# Patient Record
Sex: FEMALE | Race: BLACK OR AFRICAN AMERICAN | NOT HISPANIC OR LATINO | Employment: OTHER | URBAN - METROPOLITAN AREA
[De-identification: names, ages, dates, MRNs, and addresses within clinical notes are randomized per-mention and may not be internally consistent; named-entity substitution may affect disease eponyms.]

---

## 2020-07-09 ENCOUNTER — OFFICE VISIT (OUTPATIENT)
Dept: INTERNAL MEDICINE | Facility: CLINIC | Age: 80
End: 2020-07-09
Payer: MEDICARE

## 2020-07-09 VITALS
HEIGHT: 66 IN | SYSTOLIC BLOOD PRESSURE: 146 MMHG | OXYGEN SATURATION: 99 % | DIASTOLIC BLOOD PRESSURE: 78 MMHG | HEART RATE: 75 BPM | WEIGHT: 130.06 LBS | BODY MASS INDEX: 20.9 KG/M2

## 2020-07-09 DIAGNOSIS — Z79.899 DRUG THERAPY: ICD-10-CM

## 2020-07-09 DIAGNOSIS — R41.3 MEMORY CHANGE: ICD-10-CM

## 2020-07-09 DIAGNOSIS — Z00.00 ANNUAL PHYSICAL EXAM: Primary | ICD-10-CM

## 2020-07-09 DIAGNOSIS — Z76.89 ENCOUNTER TO ESTABLISH CARE WITH NEW DOCTOR: ICD-10-CM

## 2020-07-09 DIAGNOSIS — M15.9 OSTEOARTHRITIS OF MULTIPLE JOINTS, UNSPECIFIED OSTEOARTHRITIS TYPE: ICD-10-CM

## 2020-07-09 PROCEDURE — 99204 PR OFFICE/OUTPT VISIT, NEW, LEVL IV, 45-59 MIN: ICD-10-PCS | Mod: S$GLB,,, | Performed by: FAMILY MEDICINE

## 2020-07-09 PROCEDURE — 99204 OFFICE O/P NEW MOD 45 MIN: CPT | Mod: S$GLB,,, | Performed by: FAMILY MEDICINE

## 2020-07-09 PROCEDURE — 99999 PR PBB SHADOW E&M-NEW PATIENT-LVL IV: ICD-10-PCS | Mod: PBBFAC,,, | Performed by: FAMILY MEDICINE

## 2020-07-09 PROCEDURE — 99999 PR PBB SHADOW E&M-NEW PATIENT-LVL IV: CPT | Mod: PBBFAC,,, | Performed by: FAMILY MEDICINE

## 2020-07-09 NOTE — PROGRESS NOTES
"  Subjective:       Patient ID: Ally Steiner is a 80 y.o. female.    Chief Complaint: Establish Care    HPI    Here with DIL.    80yoF PMHx OA to [facility]. New to [facility]. Moved here from [place] (was living with daughter there) "to be back home." Used to live here prior to 2009.    Feels like starting to have memory issues. Having trouble remembering names. No longer driving.     No other acute complaints today. Takes several otc vitamins. Will have to talk with dtg about who she was following with in [facility] for primary care and get us records.     #Ortho: s/p bl knee replacements s/t OA    #HM:  - Breast Cancer screening: req records  - Cervical Cancer screening: req records  - Colorectal Cancer screening: req records  - Bone Density: req records    Review of Systems   Constitutional: Negative for appetite change, fatigue and fever.   HENT: Negative for congestion.    Respiratory: Negative for cough and shortness of breath.    Cardiovascular: Negative for chest pain and palpitations.   Gastrointestinal: Negative for abdominal pain, constipation, diarrhea, nausea and vomiting.   Genitourinary: Negative for dysuria.   Musculoskeletal: Negative for back pain.   Skin: Negative for rash.   Neurological: Negative for weakness, numbness and headaches.   Psychiatric/Behavioral: Negative for behavioral problems and hallucinations. The patient is not nervous/anxious.          Past Medical History:   Diagnosis Date    Osteoarthritis         Prior to Admission medications    Not on File        Past medical history, surgical history, and family medical history reviewed and updated as appropriate.    Medications and allergies reviewed.     Objective:          Vitals:    07/09/20 0955   BP: (!) 146/78   BP Location: Right arm   Patient Position: Sitting   BP Method: Medium (Manual)   Pulse: 75   SpO2: 99%   Weight: 59 kg (130 lb 1.1 oz)   Height: 5' 6" (1.676 m)     Body mass index is 20.99 kg/m².  Physical Exam  Vitals " signs and nursing note reviewed.   Constitutional:       General: She is not in acute distress.     Appearance: Normal appearance.   Eyes:      Extraocular Movements: Extraocular movements intact.   Cardiovascular:      Rate and Rhythm: Normal rate and regular rhythm.      Pulses: Normal pulses.      Heart sounds: Normal heart sounds. No murmur.   Pulmonary:      Effort: Pulmonary effort is normal. No respiratory distress.      Breath sounds: Normal breath sounds. No wheezing.   Neurological:      Mental Status: She is alert and oriented to person, place, and time.   Psychiatric:         Mood and Affect: Mood normal.         Behavior: Behavior normal.         No results found for: WBC, HGB, HCT, PLT, CHOL, TRIG, HDL, LDLDIRECT, ALT, AST, NA, K, CL, CREATININE, BUN, CO2, TSH, PSA, INR, GLUF, HGBA1C, MICROALBUR    Assessment:       1. Annual physical exam    2. Encounter to establish care with new doctor    3. Memory change    4. Drug therapy     5. Osteoarthritis of multiple joints, unspecified osteoarthritis type        Plan:   Ally was seen today for establish care.    Diagnoses and all orders for this visit:    Labs and f/u results.     Given fax number for our clinic so that can request records from prior office to send to us at earliest convenience. Unsure of what she has accomplished as far as screening in last 10 yrs.     Referring to our memory clinic for initial evaluation     Annual physical exam  -     Comprehensive metabolic panel; Future  -     CBC auto differential; Future  -     Lipid Panel; Future  -     Hemoglobin A1C; Future  -     TSH; Future    Encounter to establish care with new doctor    Memory change  -     Ambulatory referral/consult to Neuropsychology; Future    Drug therapy   -     CBC auto differential; Future  -     Lipid Panel; Future  -     Hemoglobin A1C; Future  -     TSH; Future    Osteoarthritis of multiple joints, unspecified osteoarthritis type        Health maintenance  reviewed with patient.     Follow up in about 6 months (around 1/9/2021).    Juarez Segal MD  Family Medicine  [facility]  7/9/2020

## 2020-07-10 ENCOUNTER — TELEPHONE (OUTPATIENT)
Dept: INTERNAL MEDICINE | Facility: CLINIC | Age: 80
End: 2020-07-10

## 2020-07-10 NOTE — TELEPHONE ENCOUNTER
----- Message from Juarez Segal MD sent at 7/10/2020  2:41 PM CDT -----  Please reach out to patient with results.     Labs significant for prediabetes and elevated cholesterol. Also, an unspecified kidney disease that may be chronic. Hard to determine what to make of these results until I can see old records. Reiterate to patient to try to get us records from prior doctor in [place]. Nothing to worry about at this point with labs and will trend levels moving forward now that we have a baseline. Continue to strive for healthy lifestyle choices.

## 2020-08-04 ENCOUNTER — OFFICE VISIT (OUTPATIENT)
Dept: NEUROLOGY | Facility: CLINIC | Age: 80
End: 2020-08-04
Payer: MEDICARE

## 2020-08-04 DIAGNOSIS — F02.818 LATE ONSET ALZHEIMER'S DISEASE WITH BEHAVIORAL DISTURBANCE: ICD-10-CM

## 2020-08-04 DIAGNOSIS — G30.1 LATE ONSET ALZHEIMER'S DISEASE WITH BEHAVIORAL DISTURBANCE: ICD-10-CM

## 2020-08-04 DIAGNOSIS — F02.80 MAJOR NEUROCOGNITIVE DISORDER DUE TO ALZHEIMER'S DISEASE: Primary | ICD-10-CM

## 2020-08-04 DIAGNOSIS — G30.9 MAJOR NEUROCOGNITIVE DISORDER DUE TO ALZHEIMER'S DISEASE: Primary | ICD-10-CM

## 2020-08-04 PROCEDURE — 99499 UNLISTED E&M SERVICE: CPT | Mod: 95,,, | Performed by: CLINICAL NEUROPSYCHOLOGIST

## 2020-08-04 PROCEDURE — 96116 NUBHVL XM PHYS/QHP 1ST HR: CPT | Mod: 95,,, | Performed by: CLINICAL NEUROPSYCHOLOGIST

## 2020-08-04 PROCEDURE — 99499 NO LOS: ICD-10-PCS | Mod: 95,,, | Performed by: CLINICAL NEUROPSYCHOLOGIST

## 2020-08-04 PROCEDURE — 96116 PR NEUROBEHAVIORAL STATUS EXAM BY PSYCH/PHYS: ICD-10-PCS | Mod: 95,,, | Performed by: CLINICAL NEUROPSYCHOLOGIST

## 2020-08-04 NOTE — PROGRESS NOTES
NEUROPSYCHOLOGY CONSULT (TELEHEALTH)    Referral Information  Name: Ally Steiner  MRN: [id number]  : 1940  Age: 80 y.o.  Race: -American  Gender: female  Referring Provider: Juarez Segal MD  Billing: See below for details as coding/billing has changed   Telemedicine:   The patient [place] is: Home  [place] [facility] [place]  The chief complaint leading to consultation/medical necessity is: Progressive memory loss concerning for dementia  Visit type: Virtual visit with synchronous audio and video  Total time spent with patient: 45-minutes  Each patient to whom he or she provides medical services by telemedicine is:  (1) informed of the relationship between the physician and patient and the respective role of any other health care provider with respect to management of the patient; and (2) notified that he or she may decline to receive medical services by telemedicine and may withdraw from such care at any time.  Consent/Emergency Plan: The patient expressed an understanding of the purpose of the evaluation and consented to all procedures. I informed the patient of limits to confidentiality and discussed an emergency plan.      SUMMARY/TREATMENT PLAN   Results from the neurobehavioral status exam indicate the following diagnoses and treatment plan recommendations. This was discussed with patient and family.      Diagnoses/Plan:  Problem List Items Addressed This Visit        Neuro    Late onset Alzheimer's disease with behavioral disturbance - Primary    Current Assessment & Plan     Assessment:  -Very likely late onset Alzheimer's dementia (moderate stage) but needs further work up by Memory Clinic  -Excellent family support  -Some behavioral sxs (paranoia, visual hallucinations) that family needs coaching on optimal management  Plan:  >>Memory Assessment Clinic:   1. Neuropsych: Testing and treatment plan  2. Medical/MD/NP: Eval by Dr. Ram including potential imaging study if  "needed  3. Care Management: Eval jackie Madrid                 HISTORY OF PRESENT ILLNESS AND CURRENT SYMPTOMS     Ms. Steiner has no major active problems aside from slightly elevated cholesterol and pre-diabetes.     Cognitive Symptoms:   Onset/Course:   o Per Pt: "I sure do have memory trouble." "Been gradually coming in" about the last 2-years. She could not provide more specificity without becoming tangential.  o Per Ms. Aponte (DIL): Gradual onset of short term memory trouble in the past 5-years with progressive loss in IADLs. In the past year, family has noticed an acceleration in cognitive/functional decline in the past year along with onset of mild behavioral sxs   - DIL noted that sleep quality does impact level of confusion day-to-day    Current Functional Status/Needs:  ADLs  Self-Care Eating Safety Other   Bathing: Independent  Bathroom: Independent  Other: -Independent for eating  -But needs prompting due to low appetite in the past year -Some wandering concerns but they manage now  - -None     Instrumental IADLs:   Driving Medications/Health Household Finances   -Stopped driving 5 years ago due to an episode of getting lost -No prescriptions  -She takes her Vitamins but may take more than needed and daughter in law handles -She does some simple chores now  -Daughter was helping for the past few years and her son and daughter-in-law help currently     No flowsheet data found.    Psychiatric/Behavioral Symptoms:  Mood:  Depression/Dysphoria Anxiety/Fearfulness Irritability   -"I'm in a real good place"  -Some sadness b/c she can't live on her own -None -Some irritability when she feels managed/controlled     No flowsheet data found.  No flowsheet data found.      Behavior:  Agitation/Resistance Delusions/Paranoia Hallucinations   -No major agitation just irritability per DIL -in the past year, she has had onset of mild paranoia that people are stealing from her or out to get her  -daughter-in-law notes " that there typical management style is to correct her or let her know that everything is fine but this generally serves to agitate her further -Some ?visual hallucinations  -But they are non-disturbing  -Onset in the last year  -daughter-in-law notes that typical management style from family is to correct her but this serves to further agitate her     Apathy/Motivation Repetitive/Restlessness Other   None None Not applicable     No flowsheet data found.        Neurovegetative:  Sleep/Nighttime  Appetite Energy   Some sleep disruption but generally okay -Low but does eat without difficulty Adequate     Suicidal/Homicidal Ideation: None    Physical Symptoms:  Mild arthritis along with recent onset of dizziness and some imbalance     PERTINENT BACKGROUND INFORMATION   SOCIAL HISTORY    · Family Status:  2x + 8 adult children (7 living children)    · Current Living Situation: Son's home in [place] (Just moved in May 2020 to have more support)  · Primary Source of Support: Multiple supports  · Daily Activities: At home but does do the stationary bike; they have noticed a big reduction in her activity level in the past year  · Stressors: None  · Other Factors:  · Educational Level: HS  · Occupational Status and History: Nurse Aide,     Family Neurologic History: Negative for heritable risk factors  Family Psychiatric History: Negative for heritable risk factors    MEDICAL STATUS  Patient Active Problem List   Diagnosis    Osteoarthritis of multiple joints    Drug therapy     Late onset Alzheimer's disease with behavioral disturbance     Past Medical History:   Diagnosis Date    Osteoarthritis      Past Surgical History:   Procedure Laterality Date    CHOLECYSTECTOMY      KNEE SURGERY Left 2018    knee replacement    KNEE SURGERY Right 2000    knee replacement    REDUCTION OF BOTH BREASTS Bilateral 1990       Updated/Relevant Neurologic History:  · Falls: None recently  · TBI:   "None  · Seizures: None  · Stroke: None  · Movement Concerns: Some dizziness/imbalance    Recent Labs   No results found for: HQVTSJXV44  No results found for: RPR  No results found for: FOLATE  Lab Results   Component Value Date    TSH 1.088 07/09/2020     Lab Results   Component Value Date    HGBA1C 5.8 (H) 07/09/2020     No results found for: HIV1X2, YGU66OEEF    Imaging  None    Current Medications  None apart from Vitamins per family    Updated/Relevant Psychiatric History: None      MENTAL STATUS AND OBSERVATIONS:  APPEARANCE: Casually dressed and adequate grooming/hygiene.   ALERTNESS/ORIENTATION: Attentive and alert. Year: ? Month: August Day: "2nd or 3rd" Date: Tuesday POTUS: "?" But laughs; City: [place];   GAIT: Not assessed  MOTOR MOVEMENTS/MANNERISMS: Not assessed  SPEECH/LANGUAGE: Normal in rate, rhythm, tone, and volume. No significant word finding difficulty noted. Expressive and receptive language was normal.  STATED MOOD/AFFECT: The patients stated mood was "good" Affect was congruent with stated mood.   INTERPERSONAL BEHAVIOR: Rapport was quickly and easily established   SUICIDALITY/HOMICIDALITY: Denied  HALLUCINATIONS/DELUSIONS: None evidenced or endorsed  THOUGHT PROCESSES/INSIGHT: No insight and often digressive/tangential    BILLING  Service Description CPT Code Minutes Units   Psychiatric diagnostic evaluation by physician [id number]     Neurobehavioral status exam by physician [id number] 45 1   Each additional hour by physician [id number]  0   Test Evaluation Services --  --   Neuropsychological testing evaluation services by physician [id number]  0   Each additional hour by physician [id number]  0   Test Administration and Scoring --  --   Psychological or neuropsychological test administration and scoring by physician [id number]  0   Each additional 30 minutes by physician [id number]  0   Psychological or neuropsychological test administration and scoring by technician [id number]  0   Each additional 30 minutes by technician [id number]  0 "

## 2020-08-04 NOTE — Clinical Note
She's better for memory clinic than my outpatient; the family is open to moving to memory clinic. Thanks!

## 2020-08-06 PROBLEM — F02.818 LATE ONSET ALZHEIMER'S DISEASE WITH BEHAVIORAL DISTURBANCE: Status: ACTIVE | Noted: 2020-08-06

## 2020-08-06 PROBLEM — G30.9 MAJOR NEUROCOGNITIVE DISORDER DUE TO ALZHEIMER'S DISEASE: Status: ACTIVE | Noted: 2020-08-06

## 2020-08-06 PROBLEM — G30.1 LATE ONSET ALZHEIMER'S DISEASE WITH BEHAVIORAL DISTURBANCE: Status: ACTIVE | Noted: 2020-08-06

## 2020-08-06 PROBLEM — F02.80 MAJOR NEUROCOGNITIVE DISORDER DUE TO ALZHEIMER'S DISEASE: Status: ACTIVE | Noted: 2020-08-06

## 2020-08-06 NOTE — ASSESSMENT & PLAN NOTE
Assessment:  -Very likely late onset Alzheimer's dementia (moderate stage) but needs further work up by Memory Clinic  -Excellent family support  -Some behavioral sxs (paranoia, visual hallucinations) that family needs coaching on optimal management  Plan:  >>Memory Assessment Clinic:   1. Neuropsych: Testing and treatment plan  2. Medical/MD/NP: Eval by Dr. Ram including potential imaging study if needed  3. Care Management: Eval by [facility]

## 2020-09-08 ENCOUNTER — TELEPHONE (OUTPATIENT)
Dept: NEUROLOGY | Facility: CLINIC | Age: 80
End: 2020-09-08

## 2020-09-09 ENCOUNTER — OUTPATIENT CASE MANAGEMENT (OUTPATIENT)
Dept: NEUROLOGY | Facility: CLINIC | Age: 80
End: 2020-09-09

## 2020-09-09 ENCOUNTER — LAB VISIT (OUTPATIENT)
Dept: LAB | Facility: HOSPITAL | Age: 80
End: 2020-09-09
Payer: MEDICARE

## 2020-09-09 ENCOUNTER — INITIAL CONSULT (OUTPATIENT)
Dept: NEUROLOGY | Facility: CLINIC | Age: 80
End: 2020-09-09
Payer: MEDICARE

## 2020-09-09 DIAGNOSIS — F02.818 LATE ONSET ALZHEIMER'S DISEASE WITH BEHAVIORAL DISTURBANCE: ICD-10-CM

## 2020-09-09 DIAGNOSIS — R41.3 MEMORY CHANGE: ICD-10-CM

## 2020-09-09 DIAGNOSIS — E78.5 HYPERLIPIDEMIA, UNSPECIFIED HYPERLIPIDEMIA TYPE: ICD-10-CM

## 2020-09-09 DIAGNOSIS — Z86.39 HX OF DIABETES MELLITUS: ICD-10-CM

## 2020-09-09 DIAGNOSIS — G30.1 LATE ONSET ALZHEIMER'S DISEASE WITH BEHAVIORAL DISTURBANCE: ICD-10-CM

## 2020-09-09 DIAGNOSIS — Z86.79 HISTORY OF HYPERTENSION: ICD-10-CM

## 2020-09-09 LAB
FOLATE SERPL-MCNC: 16.3 NG/ML (ref 4–24)
VIT B12 SERPL-MCNC: >2000 PG/ML (ref 210–950)

## 2020-09-09 PROCEDURE — 82746 ASSAY OF FOLIC ACID SERUM: CPT

## 2020-09-09 PROCEDURE — 99999 PR PBB SHADOW E&M-EST. PATIENT-LVL II: ICD-10-PCS | Mod: PBBFAC,,,

## 2020-09-09 PROCEDURE — 96133 NRPSYC TST EVAL PHYS/QHP EA: CPT | Mod: S$GLB,,, | Performed by: CLINICAL NEUROPSYCHOLOGIST

## 2020-09-09 PROCEDURE — 86592 SYPHILIS TEST NON-TREP QUAL: CPT

## 2020-09-09 PROCEDURE — 96132 NRPSYC TST EVAL PHYS/QHP 1ST: CPT | Mod: S$GLB,,, | Performed by: CLINICAL NEUROPSYCHOLOGIST

## 2020-09-09 PROCEDURE — 84425 ASSAY OF VITAMIN B-1: CPT

## 2020-09-09 PROCEDURE — 96139 PR PSYCH/NEUROPSYCH TEST ADMIN/SCORING, BY TECH, 2+ TESTS, EA ADDTL 30 MIN: ICD-10-PCS | Mod: S$GLB,,, | Performed by: CLINICAL NEUROPSYCHOLOGIST

## 2020-09-09 PROCEDURE — 99999 PR PBB SHADOW E&M-EST. PATIENT-LVL II: CPT | Mod: PBBFAC,,,

## 2020-09-09 PROCEDURE — 96138 PR PSYCH/NEUROPSYCH TEST ADMIN/SCORING, BY TECH, 2+ TESTS, 1ST 30 MIN: ICD-10-PCS | Mod: S$GLB,,, | Performed by: CLINICAL NEUROPSYCHOLOGIST

## 2020-09-09 PROCEDURE — 99499 UNLISTED E&M SERVICE: CPT | Mod: S$GLB,,, | Performed by: NURSE PRACTITIONER

## 2020-09-09 PROCEDURE — 99499 UNLISTED E&M SERVICE: CPT | Mod: S$GLB,,, | Performed by: PSYCHIATRY & NEUROLOGY

## 2020-09-09 PROCEDURE — 36415 COLL VENOUS BLD VENIPUNCTURE: CPT

## 2020-09-09 PROCEDURE — 96138 PSYCL/NRPSYC TECH 1ST: CPT | Mod: S$GLB,,, | Performed by: CLINICAL NEUROPSYCHOLOGIST

## 2020-09-09 PROCEDURE — 96132 PR NEUROPSYCHOLOGIC TEST EVAL SVCS, 1ST HR: ICD-10-PCS | Mod: S$GLB,,, | Performed by: CLINICAL NEUROPSYCHOLOGIST

## 2020-09-09 PROCEDURE — 96133 PR NEUROPSYCHOLOGIC TEST EVAL SVCS, EA ADDTL HR: ICD-10-PCS | Mod: S$GLB,,, | Performed by: CLINICAL NEUROPSYCHOLOGIST

## 2020-09-09 PROCEDURE — 82607 VITAMIN B-12: CPT

## 2020-09-09 PROCEDURE — 99204 OFFICE O/P NEW MOD 45 MIN: CPT | Mod: S$GLB,,, | Performed by: NURSE PRACTITIONER

## 2020-09-09 PROCEDURE — 99499 NO LOS: ICD-10-PCS | Mod: S$GLB,,, | Performed by: PSYCHIATRY & NEUROLOGY

## 2020-09-09 PROCEDURE — 99499 RISK ADDL DX/OHS AUDIT: ICD-10-PCS | Mod: S$GLB,,, | Performed by: NURSE PRACTITIONER

## 2020-09-09 PROCEDURE — 96139 PSYCL/NRPSYC TST TECH EA: CPT | Mod: S$GLB,,, | Performed by: CLINICAL NEUROPSYCHOLOGIST

## 2020-09-09 PROCEDURE — 99204 PR OFFICE/OUTPT VISIT, NEW, LEVL IV, 45-59 MIN: ICD-10-PCS | Mod: S$GLB,,, | Performed by: NURSE PRACTITIONER

## 2020-09-09 NOTE — PROGRESS NOTES
"Name: Ally Steiner  MRN: [id number]   CSN: [id number]      Date: 9-9-2020      Referring physician:  Juarez Segal MD  [address] [place] [address]    Subjective:      Chief Complaint: memory loss     History of Present Illness (HPI):    Ally Steiner is a 80 y.o. right-handed female with pre-diabetes ([id number] presents today for an initial evaluation of memory loss and is accompanied by daughter-in-law, Lise..     Ms. Steiner offers the following:   Tired today but slept well last night.     When asked about her memory, she says it is bad. She can't remember if paid bills or where she put things. She says it happened "gently" as she got older.   She offers that she gave up driving 3 years ago - got sick of driving. Gave her granddaugther her car.       Lise (Daughter-in-law) offers the following:  Been in [facility] mid-May as this is where she is from and she wanted to come home. Prior to that, shew as living in [place]. Her dtr lives out there. When moved back here, she was staying with her son and Lise in their home. Last Friday, she was adamant that she wanted to go back to her home so they took her there. Her 34 you granddaughter is there with her but they wonder if this is enough oversight.      Lise does not think she sleeps very deep as the slightest little thing will awake her.   Having some paranoia. Accuses son of taking her vitamins . Also thinks someone has stolen her money and jewelry.     Lise knows she has had memory problems at least a year. She recalls that she fell last August and her memory was not good before this. SHe feels the memory loss has been gradual.     Will ask where the children are and when they are coming back.   WIll ask about a man that she sees that is not there.     Daughter in [place] joined briefly by phone. Offered the following history:  Does not take any med, other than vitamins.   Had B knee replacement   brest " "reduction  Gallbladder removed    Hysterectomy   Was on DM  med in past. Lost weight, able to stop meds and now diet controlled.   Had HTN and HLD in past but with diet always been able to keep her off meds  Kidneys have been on the watch due to reduction of functioning. Was taking Aleve due to B knee pain before had surgery. Kidneys improved when stopped Aleve.     Recalls that she had brain sacn in [place] last year after falling and fracturing bones around chin. She will  get results and fax to us.        Review of Systems   Constitutional: Negative for appetite change.   HENT: Negative for trouble swallowing.    Cardiovascular: Positive for leg swelling ("sometimes"). Negative for chest pain ("sometimes, if I eat too much").   Gastrointestinal: Negative for constipation and diarrhea.   Genitourinary: Negative.    Musculoskeletal: Negative for gait problem.   Neurological: Positive for tremors ("Certain times if get my nerves or aggravated").   Psychiatric/Behavioral: Negative for sleep disturbance.       Past Medical History: The patient  has a past medical history of History of hypertension, diabetes mellitus, Hyperlipidemia, and Osteoarthritis.    Social History: The patient  reports that she has never smoked. She has never used smokeless tobacco. She reports that she does not drink alcohol or use drugs.    Family History: Their family history is not on file.    Allergies: Patient has no known allergies.     Meds:   No current outpatient medications on file prior to visit.     No current facility-administered medications on file prior to visit.        Objective:     Physical Exam:      Constitutional  Well-developed, well-nourished, appears stated age   Cardiovascular  no LE edema bilaterally     ..  Neurological    * Mental status     - Orientation Oriented to: person and year,      - Memory     Not Tested     - Attention/concentration  Easily distracted     - Language  spontaneous, fluent, talkative "     Face mask on (2/2 pandemic)  * Cranial nerves       - CN III, IV, VI  Extraocular movements full, normal pursuits and saccades     - CN VIII  Hearing intact bilaterally     - CN IX, X  Palate raises midline and symmetric     - CN XI  SCM and trapezius 5/5 bilaterally   * Motor  Muscle bulk reduced throughout, strength 4+/5 throughout BUE and 4/5 BLE   * Coordination  No dysmetria with finger-to-nose    * Gait  See below.     ..  * Specialized movement exam  No hypophonic speech.    No facial masking.   No cogwheel rigidity.     No bradykinesia.   No tremor with rest, posture, kinesis, or intention.    No other dystonia, chorea, athetosis, myoclonus, or tics.   No motor impersistence.   Normal-based gait.   Shortened stride length.  Bit antalgic.  Pace bit slow.   Somewhat stooped posture.    No abnormal arm swing.           Laboratory Results:  Lab Visit on 09/09/2020   Component Date Value Ref Range Status    Vitamin B-12 09/09/2020 >2000* 210 - 950 pg/mL Final    Folate 09/09/2020 16.3  4.0 - 24.0 ng/mL Final    RPR 09/09/2020 Non-reactive  Non-reactive Final   Lab Visit on 07/09/2020   Component Date Value Ref Range Status    Sodium 07/09/2020 140  136 - 145 mmol/L Final    Potassium 07/09/2020 4.3  3.5 - 5.1 mmol/L Final    Chloride 07/09/2020 105  95 - 110 mmol/L Final    CO2 07/09/2020 25  23 - 29 mmol/L Final    Glucose 07/09/2020 111* 70 - 110 mg/dL Final    BUN, Bld 07/09/2020 21  8 - 23 mg/dL Final    Creatinine 07/09/2020 1.2  0.5 - 1.4 mg/dL Final    Calcium 07/09/2020 9.8  8.7 - 10.5 mg/dL Final    Total Protein 07/09/2020 8.3  6.0 - 8.4 g/dL Final    Albumin 07/09/2020 3.7  3.5 - 5.2 g/dL Final    Total Bilirubin 07/09/2020 0.4  0.1 - 1.0 mg/dL Final    Alkaline Phosphatase 07/09/2020 85  55 - 135 U/L Final    AST 07/09/2020 18  10 - 40 U/L Final    ALT 07/09/2020 10  10 - 44 U/L Final    Anion Gap 07/09/2020 10  8 - 16 mmol/L Final    eGFR if  07/09/2020 49.7*  >60 mL/min/1.73 m^2 Final    eGFR if non  07/09/2020 43.1* >60 mL/min/1.73 m^2 Final    WBC 07/09/2020 6.16  3.90 - 12.70 K/uL Final    RBC 07/09/2020 3.90* 4.00 - 5.40 M/uL Final    Hemoglobin 07/09/2020 11.7* 12.0 - 16.0 g/dL Final    Hematocrit 07/09/2020 37.1  37.0 - 48.5 % Final    Mean Corpuscular Volume 07/09/2020 95  82 - 98 fL Final    Mean Corpuscular Hemoglobin 07/09/2020 30.0  27.0 - 31.0 pg Final    Mean Corpuscular Hemoglobin Conc 07/09/2020 31.5* 32.0 - 36.0 g/dL Final    RDW 07/09/2020 13.0  11.5 - 14.5 % Final    Platelets 07/09/2020 219  150 - 350 K/uL Final    MPV 07/09/2020 12.7  9.2 - 12.9 fL Final    Immature Granulocytes 07/09/2020 0.3  0.0 - 0.5 % Final    Gran # (ANC) 07/09/2020 3.3  1.8 - 7.7 K/uL Final    Immature Grans (Abs) 07/09/2020 0.02  0.00 - 0.04 K/uL Final    Lymph # 07/09/2020 2.2  1.0 - 4.8 K/uL Final    Mono # 07/09/2020 0.5  0.3 - 1.0 K/uL Final    Eos # 07/09/2020 0.2  0.0 - 0.5 K/uL Final    Baso # 07/09/2020 0.03  0.00 - 0.20 K/uL Final    nRBC 07/09/2020 0  0 /100 WBC Final    Gran% 07/09/2020 53.3  38.0 - 73.0 % Final    Lymph% 07/09/2020 36.0  18.0 - 48.0 % Final    Mono% 07/09/2020 7.5  4.0 - 15.0 % Final    Eosinophil% 07/09/2020 2.4  0.0 - 8.0 % Final    Basophil% 07/09/2020 0.5  0.0 - 1.9 % Final    Differential Method 07/09/2020 Automated   Final    Cholesterol 07/09/2020 247* 120 - 199 mg/dL Final    Triglycerides 07/09/2020 142  30 - 150 mg/dL Final    HDL 07/09/2020 60  40 - 75 mg/dL Final    LDL Cholesterol 07/09/2020 158.6  63.0 - 159.0 mg/dL Final    Hdl/Cholesterol Ratio 07/09/2020 24.3  20.0 - 50.0 % Final    Total Cholesterol/HDL Ratio 07/09/2020 4.1  2.0 - 5.0 Final    Non-HDL Cholesterol 07/09/2020 187  mg/dL Final    Hemoglobin A1C 07/09/2020 5.8* 4.0 - 5.6 % Final    Estimated Avg Glucose 07/09/2020 120  68 - 131 mg/dL Final    TSH 07/09/2020 1.088  0.400 - 4.000 uIU/mL Final       Imaging:    Independent review of images: NA     Assessment and Plan     Late onset Alzheimer's disease with behavioral disturbance    Memory change  -     Ambulatory referral/consult to Neuropsychology  -     Vitamin B1; Future; Expected date: 09/09/2020  -     Vitamin B12; Future; Expected date: 09/09/2020  -     FOLATE; Future; Expected date: 09/09/2020  -     RPR; Future; Expected date: 09/09/2020    History of hypertension    Hx of diabetes mellitus    Hyperlipidemia, unspecified hyperlipidemia type        Medical Decision Making:    Complete reversible memory labs.    Dtr to fax MRI brain report from last year when in [place].     Discussed possible med options with Lise (memory med vs something for mood). They will consider.     Follow up one year, unless needed sooner.     I spent 40 minutes face-to-face with the patient with >50% of the time spent with counseling and education regarding:  - results of data, diagnosis, and recommendations stated above  - rationale of plan  Anabell Ram, KYAW, NP-C  Division of Movement and Memory Disorders  [facility]  [phone]

## 2020-09-09 NOTE — PROGRESS NOTES
INTERDISCIPLINARY MEMORY ASSESSMENT CLINIC  Neuropsychology Visit    Referral Information  Name: Ally Steiner  MRN: [id number]  : 1940  Age: 80 y.o.  Race: Patient Refused  Gender: female  Referring Provider: Juarez Segal Md  [id number] [place] [address]  Referral Reason/Medical Necessity: Progressive memory loss concerning for dementia  Billing: See below for details as coding/billing has changed     SUMMARY/TREATMENT PLAN   Results from interdisciplinary Memory Clinic Evaluation (neuropsych testing, clinical interviews, neurology exam, review of records) indicate the following diagnoses and treatment plan recommendations. This will be discussed with the patient/family at feedback/treatment planning/caregiver education session.    Diagnoses/Plan:  Problem List Items Addressed This Visit        Neuro    Late onset Alzheimer's disease with behavioral disturbance    Current Assessment & Plan     Assessment:  -Cognitive Testing: Global and severe impairment on testing (MoCA=5/30) with intact simple attention and simple comprehension for one step/two step commands.   -Etiology/Diagnosis:   >>Alzheimer's dementia given onset/course/type of sxs, absence of reversible causes, presentation across clinical assessments, and other factors  >>Stage: moderate (based on functioning, but scores on testing are quite low)  -Current Concerns:  >> good family support but needed Education around diagnosis and expectations for care and activities  Plan:    >>Memory Continuity Clinic: Follow-up in 12-months for:  1. Neuropsych: Testing/diagnostic update/stage of dementia  2. Medical/MD/NP: Follow up neuro review  3. Care Management: Review any needs      >>Primary Care:  Continue regular follow-up over the next year in refer back to Memory Clinic as needed      >>Practice good cognitive/brain health hygiene:   1. Engage in regular exercise, which increases alertness and arousal and can improve  "attention and focus.   2. Develop a consistent daily/weekly routine,  3. Eat healthy foods and balanced meals. Talk with your physician or nutritionist about whats right for you, but a Mediterranean diet has been found to be most effective at promoting brain health.  4. Keep your brain active. Find activities appropriate to stay mentally active.  5. Stay socially engaged. Continue staying active with your family and friends.              Cardiac/Vascular    History of hypertension    Overview     always been able to manage with diet         Hyperlipidemia    Overview     always been able to manage with diet            Endocrine    Hx of diabetes mellitus    Overview     On meds in past, lost weight and has been able to maintain with diet           Other Visit Diagnoses     Memory change        Relevant Orders    Vitamin B1    Vitamin B12 (Completed)    FOLATE (Completed)    RPR (Completed)            NIKO Guzman II, Ph.D., ABPP-CN  Board Certified Clinical Neuropsychologist  Co-Director, Cognitive Disorders and Brain Health Program  Department of Neurology and Neurosciences  [facility]    HISTORY OF PRESENT ILLNESS    Ms. Steiner has active problems noted below.     Cognitive Symptoms:   Onset/Course:   o Per Pt: "I sure do have memory trouble." "Been gradually coming in" about the last 2-years. She could not provide more specificity without becoming tangential.  o Per Ms. Aponte (DIL): Gradual onset of short term memory trouble in the past 5-years with progressive loss in IADLs. In the past year, family has noticed an acceleration in cognitive/functional decline in the past year along with onset of mild behavioral sxs   - DIL noted that sleep quality does impact level of confusion day-to-day    Initial Consultation via VV on 08/04/20:   Results noted the following:   Late onset Alzheimer's disease with behavioral disturbance - Primary   Current Assessment & Plan    Assessment:  -Very likely late " "onset Alzheimer's dementia (moderate stage) but needs further work up by Memory Clinic  -Excellent family support  -Some behavioral sxs (paranoia, visual hallucinations) that family needs coaching on optimal management  Plan:  >>Memory Assessment Clinic:   4. Neuropsych: Testing and treatment plan  5. Medical/MD/NP: Eval by Dr. Ram including potential imaging study if needed  6. Care Management: Eval by [facility]               CURRENT SYMPTOMS     Cognitive Concerns: See above and no change from initial visit via VV    Current Functional Status/Needs:   DLs  Self-Care Eating Safety Other   Bathing: Independent  Bathroom: Independent  Other: -Independent for eating  -But needs prompting due to low appetite in the past year -Some wandering concerns but they manage now  - -None     Instrumental IADLs:   Driving Medications/Health Household Finances   -Stopped driving 5 years ago due to an episode of getting lost -No prescriptions  -She takes her Vitamins but may take more than needed and daughter in law handles -She does some simple chores now  -Daughter was helping for the past few years and her son and daughter-in-law help currently     IADL 9/9/2020   Ability to Use Telephone Dials a few well-known numbers   Shopping Needs to be accompanied on any shopping trip   Food Preparation Heats and serves prepared meals or prepares meals but does not maintain adequate diet   Housekeeping Maintains house alone with occasional assistance (heavy work)   Laundry Does personal laundry completely   Mode of Transportation Does not travel at all   Responsibility for Own Medications Takes responsibility if medication is prepared in advance in separate dosages   Ability to Handle Finances Manages day-to-day purchases, but needs help with banking, major purchases, etc       Psychiatric/Behavioral Symptoms:  Mood:  Depression/Dysphoria Anxiety/Fearfulness Irritability   -"I'm in a real good place"  -Some sadness b/c she can't live on her " own -None -Some irritability when she feels managed/controlled       Behavior:  Agitation/Resistance Delusions/Paranoia Hallucinations   -No major agitation just irritability per DIL -in the past year, she has had onset of mild paranoia that people are stealing from her or out to get her  -daughter-in-law notes that there typical management style is to correct her or let her know that everything is fine but this generally serves to agitate her further -Some ?visual hallucinations  -But they are non-disturbing  -Onset in the last year  -daughter-in-law notes that typical management style from family is to correct her but this serves to further agitate her     Apathy/Motivation Repetitive/Restlessness Other   None None Not applicable     NPIQ RFS 9/9/2020   WHO IS FILLING OUT FORM? Caregiver   Does this patient have false beliefs, such as thinking that others are stealing from him/her or planning to harm him/her in some way? Yes   Delusions Severity 1   Delusion Distress 1   Does this patient have hallucinations such as false visions or voices? Soliz she/he seem to hear or see things that are not present? Yes   Hallucination Severity 1   Hallucination Distress 1   Is the patient resistive to help from others at times, or hard to handle? Yes   Agitation Agression Severity 1   Agitation/Agression Distress 1   Does the patient seem sad or say that he/she is depressed? No   Does the patient become upset when  from you? Does he/she have any other signs of nervousness such as shortness of breath, sighing, being unable tor elax, or feeling excessively tense? No   Does the patient appear to feel good or act excessively happy? No   Does this patient seem less interested in his/her usual activities or in the activities and plans of others? No   Does this patient seem to act cumpolsively, for example, talking to strangers as if she/he knows them, or saying things that may hurt people's feelings? No   Is the patient  impatient and cranky? Does he/she have difficulty coping with delays or waiting for planned activities? Yes   Irritability/Liability Severity 2   Irritability/Liability Distress 1   Does the patient engage in repetitive activities such as pacing around the house, handling buttons, wrapping string, or doing other things repeatedly? No   Does this patient awaken you during the night, rise too early in the morning, or take excessive naps during the day? No   Has the patient lost or gained weight, or had a change in the type of food he/she likes? Yes   Apetitie/Eating Severity 2   Apetite/Eating Distress 1   NPI Total Severity Score 7   NPI Total Distress Score 5       Neurovegetative:  Sleep/Nighttime  Appetite Energy   Some sleep disruption but generally okay -Low but does eat without difficulty Adequate     Suicidal/Homicidal Ideation: None    Physical Symptoms:  Mild arthritis along with recent onset of dizziness and some imbalance    PERTINENT BACKGROUND INFORMATION   Social History:  · Family Status:  2x + 8 adult children (7 living children)    · Current Living Situation: Son's home in [place] (Just moved in May 2020 to have more support)  · Primary Source of Support: Multiple supports  · Daily Activities: At home but does do the stationary bike; they have noticed a big reduction in her activity level in the past year  · Stressors: None  · Other Factors:  · Educational Level: HS  · Occupational Status and History: Nurse Aide,     Family Neurologic History: Negative for heritable risk factors  Family Psychiatric History: Negative for heritable risk factors    MEDICAL HISTORY     Patient Active Problem List   Diagnosis    Osteoarthritis of multiple joints    Drug therapy     Late onset Alzheimer's disease with behavioral disturbance    History of hypertension    Hx of diabetes mellitus    Hyperlipidemia       Past Medical History:   Diagnosis Date    History of hypertension      always been able to manage with diet    Hx of diabetes mellitus     Always controlled with diet and weight loss    Hyperlipidemia     always been able to manage with diet    Osteoarthritis        Past Surgical History:   Procedure Laterality Date    CHOLECYSTECTOMY      Hysterectomy      KNEE SURGERY Left 2018    knee replacement    KNEE SURGERY Right 2000    knee replacement    REDUCTION OF BOTH BREASTS Bilateral 1990       Pertinent Neurologic History  TBIs  None   Seizures  None    CVAs  None   Movement Dis.  None   Other  None     Pertinent Labs Impacting Cognition  Lab Results   Component Value Date    KSMJTJUR31 >2000 (H) 09/09/2020     Lab Results   Component Value Date    RPR Non-reactive 09/09/2020     Lab Results   Component Value Date    FOLATE 16.3 09/09/2020     Lab Results   Component Value Date    TSH 1.088 07/09/2020     Lab Results   Component Value Date    HGBA1C 5.8 (H) 07/09/2020     No results found for: HIV1X2, GLG35IYLD    Neurodiagnostics  None in system but daughter reports a brain MRI in the past 12 months when she was in [place] that was apparently unremarkable.  She is going to send in the study to Dr. Ram.    No current outpatient medications on file.    Relevant Psychiatric History:  · Sxs: None  · Treatment History: None  · Substance Use: None    Social History     Tobacco Use    Smoking status: Never Smoker    Smokeless tobacco: Never Used   Substance and Sexual Activity    Alcohol use: Never     Frequency: Never    Drug use: Never    Sexual activity: Not on file         MENTAL STATUS AND OBSERVATIONS:   APPEARANCE: Casually dressed and adequate grooming/hygiene.   ALERTNESS/ORIENTATION: Attentive and alert. Largely disoriented  GAIT: Review Neurology Note from Clinic  MOTOR MOVEMENTS/MANNERISMS: Review Neurology Note from Clinic  SPEECH/LANGUAGE: Normal in rate, rhythm, tone, and volume. No significant word finding difficulty noted. Expressive and receptive  "language was normal.  STATED MOOD/AFFECT: The patients stated mood was "good." Affect was congruent with stated mood.   INTERPERSONAL BEHAVIOR: Rapport was quickly and easily established   SUICIDALITY/HOMICIDALITY: Denied  HALLUCINATIONS/DELUSIONS: None evidenced or endorsed  THOUGHT PROCESSES: Unremarkable  TEST TAKING BEHAVIOR and VALIDITY: Observation of effort was suggestive of adequate engagement. The current results, therefore, are likely a valid reflection of the patient's current functioning.       PROCEDURES/TESTS ADMINISTERED:   In addition to performing a review of pertinent medical records, reviewing limits to confidentiality, conducting a clinical interview, and explaining procedures, the following measures were administered: Valeriano Cognitive Assessment (MoCA), RBANS-A, NPI-2, IADL, FAS/Animals; Trails A/B ([facility] Norms) Manual norms were used unless otherwise indicated.        DATA TABLE       BILLING     Service Description CPT Code Minutes Units   Psychiatric diagnostic evaluation by physician [id number]     Neurobehavioral status exam by physician [id number]     Each additional hour by physician [id number]     Test Evaluation Services --     Neuropsychological testing evaluation services by physician [id number] [phone]   Each additional hour by physician [id number] 1   Test Administration and Scoring --     Psychological or neuropsychological test administration and scoring by physician [id number]     Each additional 30 minutes by physician [id number]     Psychological or neuropsychological test administration and scoring by technician [id number]     Each additional 30 minutes by technician [id number]           "

## 2020-09-09 NOTE — PROGRESS NOTES
Met at assessment clinic visit  WISAM Lezama accompanied her to appt: [phone]  Daughter Alyssa also helps but works shift work: [phone]  No needs at this time and they feel they are managing things well but are open to follow up call from LCSW to go over continued care.  Next outreach assigned to LCSW.

## 2020-09-10 LAB — RPR SER QL: NORMAL

## 2020-09-11 NOTE — ASSESSMENT & PLAN NOTE
Assessment:  -Cognitive Testing: Global and severe impairment on testing (MoCA=5/30) with intact simple attention and simple comprehension for one step/two step commands.   -Etiology/Diagnosis:   >>Alzheimer's dementia given onset/course/type of sxs, absence of reversible causes, presentation across clinical assessments, and other factors  >>Stage: moderate (based on functioning, but scores on testing are quite low)  -Current Concerns:  >> good family support but needed Education around diagnosis and expectations for care and activities  Plan:    >>Memory Continuity Clinic: Follow-up in 12-months for:  1. Neuropsych: Testing/diagnostic update/stage of dementia  2. Medical/MD/NP: Follow up neuro review  3. Care Management: Review any needs      >>Primary Care:  Continue regular follow-up over the next year in refer back to Memory Clinic as needed      >>Practice good cognitive/brain health hygiene:   1. Engage in regular exercise, which increases alertness and arousal and can improve attention and focus.   2. Develop a consistent daily/weekly routine,  3. Eat healthy foods and balanced meals. Talk with your physician or nutritionist about whats right for you, but a Mediterranean diet has been found to be most effective at promoting brain health.  4. Keep your brain active. Find activities appropriate to stay mentally active.  5. Stay socially engaged. Continue staying active with your family and friends.

## 2020-09-14 ENCOUNTER — OUTPATIENT CASE MANAGEMENT (OUTPATIENT)
Dept: NEUROLOGY | Facility: CLINIC | Age: 80
End: 2020-09-14

## 2020-09-15 ENCOUNTER — OUTPATIENT CASE MANAGEMENT (OUTPATIENT)
Dept: NEUROLOGY | Facility: CLINIC | Age: 80
End: 2020-09-15

## 2020-09-18 LAB — VIT B1 BLD-MCNC: 52 UG/L (ref 38–122)

## 2020-09-19 ENCOUNTER — OUTPATIENT CASE MANAGEMENT (OUTPATIENT)
Dept: NEUROLOGY | Facility: CLINIC | Age: 80
End: 2020-09-19

## 2020-09-19 NOTE — PROGRESS NOTES
Social Work - Neuropsychology Clinic:    Phoned pt's daughter-in-law Lise, to assess care management needs. She reported family is currently able to manage care, although she did have questions anticipating the disease progression and how pt's needs may change and increase. She reported pt gets confused, for example trying to use a cell phone as a remote. Pt is currently staying during the day at son and WISAM's home; however, she strongly wants to be at her own home, so at night she goes back there and her granddtr stays with her. We discussed issues related to Advanced Care Planning and possible care needs. Advised WISAM I will e-mail multiple resources and sites related to topics discussed today, along with some general dementia info.

## 2020-10-06 ENCOUNTER — TELEPHONE (OUTPATIENT)
Dept: NEUROLOGY | Facility: CLINIC | Age: 80
End: 2020-10-06

## 2020-10-07 ENCOUNTER — OFFICE VISIT (OUTPATIENT)
Dept: NEUROLOGY | Facility: CLINIC | Age: 80
End: 2020-10-07
Payer: MEDICARE

## 2020-10-07 ENCOUNTER — OUTPATIENT CASE MANAGEMENT (OUTPATIENT)
Dept: NEUROLOGY | Facility: CLINIC | Age: 80
End: 2020-10-07

## 2020-10-07 DIAGNOSIS — Z86.79 HISTORY OF HYPERTENSION: ICD-10-CM

## 2020-10-07 DIAGNOSIS — F02.818 LATE ONSET ALZHEIMER'S DISEASE WITH BEHAVIORAL DISTURBANCE: Primary | ICD-10-CM

## 2020-10-07 DIAGNOSIS — G30.1 LATE ONSET ALZHEIMER'S DISEASE WITH BEHAVIORAL DISTURBANCE: Primary | ICD-10-CM

## 2020-10-07 DIAGNOSIS — Z86.39 HX OF DIABETES MELLITUS: ICD-10-CM

## 2020-10-07 DIAGNOSIS — E78.5 HYPERLIPIDEMIA, UNSPECIFIED HYPERLIPIDEMIA TYPE: ICD-10-CM

## 2020-10-07 PROCEDURE — 99214 PR OFFICE/OUTPT VISIT, EST, LEVL IV, 30-39 MIN: ICD-10-PCS | Mod: S$GLB,,, | Performed by: NURSE PRACTITIONER

## 2020-10-07 PROCEDURE — 99999 PR PBB SHADOW E&M-EST. PATIENT-LVL I: ICD-10-PCS | Mod: PBBFAC,,, | Performed by: NURSE PRACTITIONER

## 2020-10-07 PROCEDURE — 99499 UNLISTED E&M SERVICE: CPT | Mod: S$GLB,,, | Performed by: NURSE PRACTITIONER

## 2020-10-07 PROCEDURE — 99999 PR PBB SHADOW E&M-EST. PATIENT-LVL I: CPT | Mod: PBBFAC,,, | Performed by: NURSE PRACTITIONER

## 2020-10-07 PROCEDURE — 99214 OFFICE O/P EST MOD 30 MIN: CPT | Mod: S$GLB,,, | Performed by: NURSE PRACTITIONER

## 2020-10-07 PROCEDURE — 99499 NO LOS: ICD-10-PCS | Mod: S$GLB,,, | Performed by: NURSE PRACTITIONER

## 2020-10-07 RX ORDER — ASPIRIN 81 MG/1
81 TABLET ORAL DAILY
COMMUNITY
End: 2021-04-13

## 2020-10-07 NOTE — PROGRESS NOTES
Memory Clinic   NEUROPSYCHOLOGY FEEDBACK     Referral Information  Name: Ally Steiner  MRN: [id number]  : 1940  Age: 80 y.o.  Billing: Charges for Neuropsychology Feedback billed on 2020  The chief complaint leading to consultation/medical necessity is: Feedback session for results/treatment plan discussion  Visit type: Feedback session  Total time spent with patient: 35-minutes  Each patient to whom he or she provides medical services by telemedicine is:  (1) informed of the relationship between the physician and patient and the respective role of any other health care provider with respect to management of the patient; and (2) notified that he or she may decline to receive medical services by telemedicine and may withdraw from such care at any time.  Consent/Emergency Plan: The patient expressed an understanding of the purpose of the evaluation and consented to all procedures. I informed the patient of limits to confidentiality and discussed an emergency plan.    Note: Review Neuropsychology Consult dated for 2020 details of feedback discussion.     Additional Issues: After discussion of results/plan, daughter had lengthy discussion with us regarding sleep. Will try behavioral strategies and then will follow-up with us if that doesn't work. Review Dr. Ram note for medication recs should sleep not improve.

## 2020-10-07 NOTE — PROGRESS NOTES
"Name: Ally Steiner  MRN: [id number]   CSN: [id number]      Date: 10-7-2020      Referring physician:  No referring provider defined for this encounter.    Subjective:      Chief Complaint: memory loss     History of Present Illness (HPI):    Ally Steiner is a 80 y.o. right-handed female with HLD, HTN (diet controlled), pre-diabetes,hx DM (diet controlled) who presents today for a follow-up evaluation in the [facility]. She was last seen in this clinic 9-9-2020. She is accompanied today by daughter, Alyssa. Daughter, Liseth, from [place] also joined by phone near end of visit.       Alyssa offers the following:   Had episode this morning, agitated, as she could not find her make-up.   Sees people that have passed on.  Some struggle to get her to eat.   Will repeat self.     Ms. Steiner offers the following:  Has own house.   They make me eat.  They make me sleep.  They make me take a bath.     Liseth provides assistance with the following:  In the past, she had trazodone PRN, donepezil PRN, meclizine PRN. She was on these in [place] but is not taking any meds right now. She is confident that she was only getting donepezil PRN.   She does not sleep soundly at night. Will take "cat nap" but dtr not sure how long she sleeps.   Doc told her to give PRN  Trazondone helped tremendously but only gave it to her when she absolutely needed it    In past, she has had renal issues- low functioning but discovered to have been taking Aleve routinley and felt it was due to this. Renal function improved.   No heart issues/ no arrhythmias.   No liver issues.   She is taking ASA daily per daughters.    Review of Systems   Unable to perform ROS: Dementia       Past Medical History: The patient  has a past medical history of History of hypertension, diabetes mellitus, Hyperlipidemia, and Osteoarthritis.    Social History: The patient  reports that she has never smoked. She has " never used smokeless tobacco. She reports that she does not drink alcohol or use drugs.    Family History: Their family history is not on file.    Allergies: Patient has no known allergies.     Meds:   Current Outpatient Medications on File Prior to Visit   Medication Sig Dispense Refill    aspirin (ECOTRIN) 81 MG EC tablet Take 81 mg by mouth once daily.       No current facility-administered medications on file prior to visit.        Objective:     Physical Exam:      Constitutional  Well-developed, well-nourished, appears stated age  Well groomed and dressed     Awake, alert, pleasant and cooperative.   RR equal and unlabored.   Face symmetric.   EOMI.  Hearing intact to conversation.   No tremor.  No global bradykinesia.   Gait normal and steady.      Laboratory Results:  Lab Visit on 09/09/2020   Component Date Value Ref Range Status    Thiamine 09/09/2020 52  38 - 122 ug/L Final    Vitamin B-12 09/09/2020 >2000* 210 - 950 pg/mL Final    Folate 09/09/2020 16.3  4.0 - 24.0 ng/mL Final    RPR 09/09/2020 Non-reactive  Non-reactive Final   Lab Visit on 07/09/2020   Component Date Value Ref Range Status    Sodium 07/09/2020 140  136 - 145 mmol/L Final    Potassium 07/09/2020 4.3  3.5 - 5.1 mmol/L Final    Chloride 07/09/2020 105  95 - 110 mmol/L Final    CO2 07/09/2020 25  23 - 29 mmol/L Final    Glucose 07/09/2020 111* 70 - 110 mg/dL Final    BUN, Bld 07/09/2020 21  8 - 23 mg/dL Final    Creatinine 07/09/2020 1.2  0.5 - 1.4 mg/dL Final    Calcium 07/09/2020 9.8  8.7 - 10.5 mg/dL Final    Total Protein 07/09/2020 8.3  6.0 - 8.4 g/dL Final    Albumin 07/09/2020 3.7  3.5 - 5.2 g/dL Final    Total Bilirubin 07/09/2020 0.4  0.1 - 1.0 mg/dL Final    Alkaline Phosphatase 07/09/2020 85  55 - 135 U/L Final    AST 07/09/2020 18  10 - 40 U/L Final    ALT 07/09/2020 10  10 - 44 U/L Final    Anion Gap 07/09/2020 10  8 - 16 mmol/L Final    eGFR if  07/09/2020 49.7* >60 mL/min/1.73 m^2 Final     eGFR if non  07/09/2020 43.1* >60 mL/min/1.73 m^2 Final    WBC 07/09/2020 6.16  3.90 - 12.70 K/uL Final    RBC 07/09/2020 3.90* 4.00 - 5.40 M/uL Final    Hemoglobin 07/09/2020 11.7* 12.0 - 16.0 g/dL Final    Hematocrit 07/09/2020 37.1  37.0 - 48.5 % Final    Mean Corpuscular Volume 07/09/2020 95  82 - 98 fL Final    Mean Corpuscular Hemoglobin 07/09/2020 30.0  27.0 - 31.0 pg Final    Mean Corpuscular Hemoglobin Conc 07/09/2020 31.5* 32.0 - 36.0 g/dL Final    RDW 07/09/2020 13.0  11.5 - 14.5 % Final    Platelets 07/09/2020 219  150 - 350 K/uL Final    MPV 07/09/2020 12.7  9.2 - 12.9 fL Final    Immature Granulocytes 07/09/2020 0.3  0.0 - 0.5 % Final    Gran # (ANC) 07/09/2020 3.3  1.8 - 7.7 K/uL Final    Immature Grans (Abs) 07/09/2020 0.02  0.00 - 0.04 K/uL Final    Lymph # 07/09/2020 2.2  1.0 - 4.8 K/uL Final    Mono # 07/09/2020 0.5  0.3 - 1.0 K/uL Final    Eos # 07/09/2020 0.2  0.0 - 0.5 K/uL Final    Baso # 07/09/2020 0.03  0.00 - 0.20 K/uL Final    nRBC 07/09/2020 0  0 /100 WBC Final    Gran% 07/09/2020 53.3  38.0 - 73.0 % Final    Lymph% 07/09/2020 36.0  18.0 - 48.0 % Final    Mono% 07/09/2020 7.5  4.0 - 15.0 % Final    Eosinophil% 07/09/2020 2.4  0.0 - 8.0 % Final    Basophil% 07/09/2020 0.5  0.0 - 1.9 % Final    Differential Method 07/09/2020 Automated   Final    Cholesterol 07/09/2020 247* 120 - 199 mg/dL Final    Triglycerides 07/09/2020 142  30 - 150 mg/dL Final    HDL 07/09/2020 60  40 - 75 mg/dL Final    LDL Cholesterol 07/09/2020 158.6  63.0 - 159.0 mg/dL Final    Hdl/Cholesterol Ratio 07/09/2020 24.3  20.0 - 50.0 % Final    Total Cholesterol/HDL Ratio 07/09/2020 4.1  2.0 - 5.0 Final    Non-HDL Cholesterol 07/09/2020 187  mg/dL Final    Hemoglobin A1C 07/09/2020 5.8* 4.0 - 5.6 % Final    Estimated Avg Glucose 07/09/2020 120  68 - 131 mg/dL Final    TSH 07/09/2020 1.088  0.400 - 4.000 uIU/mL Final       Imaging:   Independent review of images: never  got report/images from [place]    Assessment and Plan     Late onset Alzheimer's disease with behavioral disturbance    Hyperlipidemia, unspecified hyperlipidemia type    Hx of diabetes mellitus    History of hypertension        Medical Decision Making:    Reduce naps to try and promote sleep at night.     Rec melatonin one hour before sleep.    Could consider mirtazapine for appetite, sleep and mood but would want to assure renal function okay.   SSRI is also an option to consider.       I spent 30 minutes face-to-face with the patient with >90% of the time spent with counseling and education regarding:  - results of data, diagnosis, and recommendations stated above  - the prognosis of dementia  - risks and benefits of donepezil, trazodone, melatonin, meclizine, mirtazapine  - importance of diet and exercise    Anabell Ram, DNP, NP-C  Division of Movement and Memory Disorders  [facility]  [phone]

## 2020-10-07 NOTE — LETTER
October 8, 2020      [facility] [address]  [address]  [facility] [address]  Phone: [phone]       Patient: Ally Steiner   YOB: 1940  Date of Visit: 10/08/2020    To Whom It May Concern:    Urmila Steiner  was at [facility] on 10/08/2020. She may return to work/school on 10/08 with no restrictions. If you have any questions or concerns, or if I can be of further assistance, please do not hesitate to contact me.    Sincerely,    Pao Martinez

## 2020-10-12 NOTE — PROGRESS NOTES
Social Work - Neuropsychology Clinic:     Accompanied by [facility] Intern  Met briefly today with patient and her daughter Alyssa to assess further care management needs. Dtr reported that the family works cooperatively to provide care and supervision to patient, and they feel well-equipped to meet her needs. Patient contributed a few observations and appeared to be in good spirits. No additional needs are identified today. Daughter was given my card and encouraged to contact me if further needs arise. No further follow-up is planned at this time.

## 2020-12-14 ENCOUNTER — TELEPHONE (OUTPATIENT)
Dept: NEUROLOGY | Facility: CLINIC | Age: 80
End: 2020-12-14

## 2020-12-14 NOTE — TELEPHONE ENCOUNTER
----- Message from Yahaira Sage MA sent at 12/14/2020 11:23 AM CST -----  Contact: [phone] daughter  / Izabel Lopez  Pt's daughter would like to discuss medication for dementia    [phone] daughter  / Izabel Lopez

## 2020-12-15 ENCOUNTER — TELEPHONE (OUTPATIENT)
Dept: NEUROLOGY | Facility: CLINIC | Age: 80
End: 2020-12-15

## 2020-12-15 DIAGNOSIS — Z01.818 PRE-OP TESTING: ICD-10-CM

## 2020-12-15 DIAGNOSIS — G47.00 INSOMNIA, UNSPECIFIED TYPE: Primary | ICD-10-CM

## 2020-12-15 RX ORDER — TRAZODONE HYDROCHLORIDE 50 MG/1
TABLET ORAL
Qty: 15 TABLET | Refills: 5 | Status: SHIPPED | OUTPATIENT
Start: 2020-12-15 | End: 2021-04-14 | Stop reason: SDUPTHER

## 2020-12-15 NOTE — TELEPHONE ENCOUNTER
Spoke with Ms. Lezama (dtr in law), she was informed a message was sent to [facility] yesterday regarding medication being sent to the pharmacy for Dementia.

## 2020-12-15 NOTE — TELEPHONE ENCOUNTER
----- Message from Marielle Haney sent at 12/15/2020  2:41 PM CST -----  Pt daughter needs an update on medication for dementia asking for a call back.    Contact info [phone]

## 2020-12-16 NOTE — TELEPHONE ENCOUNTER
Spoke to Liseth.   More issues with sleeping, melatonin not helping  Had trazodone 50 mg- gave half tab PRN when she lived in [place] with Liseth.   Worked fine.   Sent new Rx for this.    Discussed option of donepezil - don't mind prescribing but do not want to restart this at the same time as trazodone.  Call me in new year with update - will decide then if we increase trazodone or begin donepezil    Mom is becoming more diff with listening not wanting to go to son's house to sleep - fighting them more on this.  Suspect we are going to have to be more forceful in that staying alone is not an option..

## 2020-12-30 ENCOUNTER — HOSPITAL ENCOUNTER (EMERGENCY)
Facility: HOSPITAL | Age: 80
End: 2020-12-30
Attending: EMERGENCY MEDICINE
Payer: MEDICARE

## 2020-12-30 ENCOUNTER — HOSPITAL ENCOUNTER (EMERGENCY)
Facility: HOSPITAL | Age: 80
Discharge: HOME | End: 2020-12-31
Attending: EMERGENCY MEDICINE
Payer: MEDICARE

## 2020-12-30 VITALS
BODY MASS INDEX: 24.2 KG/M2 | RESPIRATION RATE: 17 BRPM | SYSTOLIC BLOOD PRESSURE: 166 MMHG | DIASTOLIC BLOOD PRESSURE: 78 MMHG | TEMPERATURE: 99 F | OXYGEN SATURATION: 100 % | WEIGHT: 136.56 LBS | HEIGHT: 63 IN | HEART RATE: 77 BPM

## 2020-12-30 DIAGNOSIS — S02.611A CLOSED FRACTURE OF RIGHT CONDYLAR PROCESS OF MANDIBLE, INITIAL ENCOUNTER: ICD-10-CM

## 2020-12-30 DIAGNOSIS — S02.609A CLOSED FRACTURE OF LEFT SIDE OF MANDIBLE, UNSPECIFIED MANDIBULAR SITE, INITIAL ENCOUNTER: Primary | ICD-10-CM

## 2020-12-30 DIAGNOSIS — S02.611A FRACTURE OF CONDYLAR PROCESS OF RIGHT MANDIBLE, INITIAL ENCOUNTER FOR CLOSED FRACTURE: ICD-10-CM

## 2020-12-30 DIAGNOSIS — S02.609A: Primary | ICD-10-CM
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
## 2020-12-30 LAB
ALBUMIN SERPL BCP-MCNC: 4.3 G/DL (ref 3.5–5.2)
ALP SERPL-CCNC: 96 U/L (ref 38–126)
ALT SERPL W/O P-5'-P-CCNC: 13 U/L (ref 10–44)
ANION GAP SERPL CALC-SCNC: 10 MMOL/L (ref 8–16)
AST SERPL-CCNC: 27 U/L (ref 15–46)
BACTERIA #/AREA URNS AUTO: NORMAL /HPF
BASOPHILS # BLD AUTO: 0.03 K/UL (ref 0–0.2)
BASOPHILS NFR BLD: 0.4 % (ref 0–1.9)
BILIRUB SERPL-MCNC: 0.4 MG/DL (ref 0.1–1)
BILIRUB UR QL STRIP: NEGATIVE
CALCIUM SERPL-MCNC: 9.3 MG/DL (ref 8.7–10.5)
CHLORIDE SERPL-SCNC: 96 MMOL/L (ref 95–110)
CLARITY UR REFRACT.AUTO: CLEAR
CO2 SERPL-SCNC: 27 MMOL/L (ref 23–29)
COLOR UR AUTO: ABNORMAL
CREAT SERPL-MCNC: 1.2 MG/DL (ref 0.5–1.4)
DIFFERENTIAL METHOD: ABNORMAL
EOSINOPHIL # BLD AUTO: 0 K/UL (ref 0–0.5)
EOSINOPHIL NFR BLD: 0.5 % (ref 0–8)
ERYTHROCYTE [DISTWIDTH] IN BLOOD BY AUTOMATED COUNT: 12 % (ref 11.5–14.5)
EST. GFR  (AFRICAN AMERICAN): 49.7 ML/MIN/1.73 M^2
EST. GFR  (NON AFRICAN AMERICAN): 43.1 ML/MIN/1.73 M^2
GLUCOSE SERPL-MCNC: 120 MG/DL (ref 70–110)
GLUCOSE UR QL STRIP: NEGATIVE
HCT VFR BLD AUTO: 35.4 % (ref 37–48.5)
HGB BLD-MCNC: 11.5 G/DL (ref 12–16)
HGB UR QL STRIP: NEGATIVE
IMM GRANULOCYTES # BLD AUTO: 0.02 K/UL (ref 0–0.04)
IMM GRANULOCYTES NFR BLD AUTO: 0.2 % (ref 0–0.5)
KETONES UR QL STRIP: NEGATIVE
LEUKOCYTE ESTERASE UR QL STRIP: ABNORMAL
LYMPHOCYTES # BLD AUTO: 1.5 K/UL (ref 1–4.8)
LYMPHOCYTES NFR BLD: 18.1 % (ref 18–48)
MCH RBC QN AUTO: 29.6 PG (ref 27–31)
MCHC RBC AUTO-ENTMCNC: 32.5 G/DL (ref 32–36)
MCV RBC AUTO: 91 FL (ref 82–98)
MICROSCOPIC COMMENT: NORMAL
MONOCYTES # BLD AUTO: 0.5 K/UL (ref 0.3–1)
MONOCYTES NFR BLD: 5.5 % (ref 4–15)
NEUTROPHILS # BLD AUTO: 6.4 K/UL (ref 1.8–7.7)
NEUTROPHILS NFR BLD: 75.3 % (ref 38–73)
NITRITE UR QL STRIP: NEGATIVE
NRBC BLD-RTO: 0 /100 WBC
NT-PROBNP SERPL-MCNC: 415 PG/ML (ref 5–1800)
PH UR STRIP: 7 [PH] (ref 5–8)
PLATELET # BLD AUTO: 195 K/UL (ref 150–350)
PMV BLD AUTO: 11.4 FL (ref 9.2–12.9)
POTASSIUM SERPL-SCNC: 4.6 MMOL/L (ref 3.5–5.1)
PROT SERPL-MCNC: 8.3 G/DL (ref 6–8.4)
PROT UR QL STRIP: ABNORMAL
RBC # BLD AUTO: 3.88 M/UL (ref 4–5.4)
SARS-COV-2 RDRP RESP QL NAA+PROBE: NEGATIVE
SODIUM SERPL-SCNC: 133 MMOL/L (ref 136–145)
SP GR UR STRIP: 1.01 (ref 1–1.03)
TROPONIN I SERPL-MCNC: <0.012 NG/ML (ref 0.01–0.03)
URN SPEC COLLECT METH UR: ABNORMAL
UROBILINOGEN UR STRIP-ACNC: NEGATIVE EU/DL
UUN UR-MCNC: 19 MG/DL (ref 7–17)
WBC # BLD AUTO: 8.47 K/UL (ref 3.9–12.7)
WBC #/AREA URNS AUTO: 1 /HPF (ref 0–5)

## 2020-12-30 PROCEDURE — 85025 COMPLETE CBC W/AUTO DIFF WBC: CPT | Mod: ER

## 2020-12-30 PROCEDURE — 93010 ELECTROCARDIOGRAM REPORT: CPT | Mod: ,,, | Performed by: INTERNAL MEDICINE

## 2020-12-30 PROCEDURE — 93010 EKG 12-LEAD: ICD-10-PCS | Mod: ,,, | Performed by: INTERNAL MEDICINE

## 2020-12-30 PROCEDURE — U0002 COVID-19 LAB TEST NON-CDC: HCPCS | Mod: ER

## 2020-12-30 PROCEDURE — 93005 ELECTROCARDIOGRAM TRACING: CPT | Mod: ER

## 2020-12-30 PROCEDURE — 25000003 PHARM REV CODE 250: Mod: ER | Performed by: PHYSICIAN ASSISTANT

## 2020-12-30 PROCEDURE — 99285 EMERGENCY DEPT VISIT HI MDM: CPT | Mod: 25,27,ER

## 2020-12-30 PROCEDURE — 99284 EMERGENCY DEPT VISIT MOD MDM: CPT | Mod: 25

## 2020-12-30 PROCEDURE — 81000 URINALYSIS NONAUTO W/SCOPE: CPT | Mod: ER

## 2020-12-30 PROCEDURE — 99284 EMERGENCY DEPT VISIT MOD MDM: CPT | Mod: ,,, | Performed by: EMERGENCY MEDICINE

## 2020-12-30 PROCEDURE — 99284 PR EMERGENCY DEPT VISIT,LEVEL IV: ICD-10-PCS | Mod: ,,, | Performed by: EMERGENCY MEDICINE

## 2020-12-30 PROCEDURE — 84484 ASSAY OF TROPONIN QUANT: CPT | Mod: ER

## 2020-12-30 PROCEDURE — 83880 ASSAY OF NATRIURETIC PEPTIDE: CPT | Mod: ER

## 2020-12-30 PROCEDURE — 80053 COMPREHEN METABOLIC PANEL: CPT | Mod: ER

## 2020-12-30 RX ORDER — HYDROCODONE BITARTRATE AND ACETAMINOPHEN 5; 325 MG/1; MG/1
1 TABLET ORAL
Status: COMPLETED | OUTPATIENT
Start: 2020-12-30 | End: 2020-12-30

## 2020-12-30 RX ADMIN — HYDROCODONE BITARTRATE AND ACETAMINOPHEN 1 TABLET: 5; 325 TABLET ORAL at 07:12

## 2020-12-31 VITALS
DIASTOLIC BLOOD PRESSURE: 88 MMHG | RESPIRATION RATE: 20 BRPM | SYSTOLIC BLOOD PRESSURE: 204 MMHG | OXYGEN SATURATION: 99 % | TEMPERATURE: 98 F | HEART RATE: 69 BPM

## 2020-12-31 PROBLEM — S02.609A CLOSED FRACTURE OF LEFT SIDE OF MANDIBLE: Status: ACTIVE | Noted: 2020-12-31

## 2020-12-31 PROCEDURE — 99499 NO LOS: ICD-10-PCS | Mod: ,,, | Performed by: OTOLARYNGOLOGY

## 2020-12-31 PROCEDURE — 99499 UNLISTED E&M SERVICE: CPT | Mod: ,,, | Performed by: OTOLARYNGOLOGY

## 2020-12-31 RX ORDER — AMOXICILLIN AND CLAVULANATE POTASSIUM 875; 125 MG/1; MG/1
1 TABLET, FILM COATED ORAL 2 TIMES DAILY
Qty: 14 TABLET | Refills: 0 | Status: ON HOLD | OUTPATIENT
Start: 2020-12-31 | End: 2021-01-06 | Stop reason: HOSPADM

## 2020-12-31 RX ORDER — DOCUSATE SODIUM 100 MG/1
100 CAPSULE, LIQUID FILLED ORAL 2 TIMES DAILY PRN
Qty: 30 CAPSULE | Refills: 0 | Status: SHIPPED | OUTPATIENT
Start: 2020-12-31 | End: 2023-03-15 | Stop reason: CLARIF

## 2020-12-31 RX ORDER — HYDROCODONE BITARTRATE AND ACETAMINOPHEN 5; 325 MG/1; MG/1
1 TABLET ORAL EVERY 6 HOURS PRN
Qty: 12 TABLET | Refills: 0 | Status: SHIPPED | OUTPATIENT
Start: 2020-12-31 | End: 2021-01-03

## 2021-01-04 ENCOUNTER — LAB VISIT (OUTPATIENT)
Dept: INTERNAL MEDICINE | Facility: CLINIC | Age: 81
End: 2021-01-04
Payer: MEDICARE

## 2021-01-04 ENCOUNTER — OFFICE VISIT (OUTPATIENT)
Dept: OTOLARYNGOLOGY | Facility: CLINIC | Age: 81
End: 2021-01-04
Payer: MEDICARE

## 2021-01-04 VITALS
DIASTOLIC BLOOD PRESSURE: 80 MMHG | BODY MASS INDEX: 21.25 KG/M2 | HEART RATE: 70 BPM | SYSTOLIC BLOOD PRESSURE: 144 MMHG | HEIGHT: 63 IN | WEIGHT: 119.94 LBS

## 2021-01-04 DIAGNOSIS — G30.1 LATE ONSET ALZHEIMER'S DISEASE WITH BEHAVIORAL DISTURBANCE: ICD-10-CM

## 2021-01-04 DIAGNOSIS — S02.652A CLOSED FRACTURE OF LEFT MANDIBULAR ANGLE, INITIAL ENCOUNTER: Primary | ICD-10-CM

## 2021-01-04 DIAGNOSIS — F02.818 LATE ONSET ALZHEIMER'S DISEASE WITH BEHAVIORAL DISTURBANCE: ICD-10-CM

## 2021-01-04 DIAGNOSIS — K08.109 EDENTULOUS: ICD-10-CM

## 2021-01-04 DIAGNOSIS — S02.621A CLOSED SUBCONDYLAR FRACTURE OF RIGHT SIDE OF MANDIBLE, INITIAL ENCOUNTER: ICD-10-CM

## 2021-01-04 DIAGNOSIS — Z01.818 PRE-OP TESTING: ICD-10-CM

## 2021-01-04 DIAGNOSIS — Z87.81 HISTORY OF FACIAL FRACTURE: ICD-10-CM

## 2021-01-04 PROCEDURE — 1159F PR MEDICATION LIST DOCUMENTED IN MEDICAL RECORD: ICD-10-PCS | Mod: S$GLB,,, | Performed by: OTOLARYNGOLOGY

## 2021-01-04 PROCEDURE — U0003 INFECTIOUS AGENT DETECTION BY NUCLEIC ACID (DNA OR RNA); SEVERE ACUTE RESPIRATORY SYNDROME CORONAVIRUS 2 (SARS-COV-2) (CORONAVIRUS DISEASE [COVID-19]), AMPLIFIED PROBE TECHNIQUE, MAKING USE OF HIGH THROUGHPUT TECHNOLOGIES AS DESCRIBED BY CMS-2020-01-R: HCPCS

## 2021-01-04 PROCEDURE — 1100F PTFALLS ASSESS-DOCD GE2>/YR: CPT | Mod: CPTII,S$GLB,, | Performed by: OTOLARYNGOLOGY

## 2021-01-04 PROCEDURE — 99204 OFFICE O/P NEW MOD 45 MIN: CPT | Mod: S$GLB,,, | Performed by: OTOLARYNGOLOGY

## 2021-01-04 PROCEDURE — 99499 RISK ADDL DX/OHS AUDIT: ICD-10-PCS | Mod: S$GLB,,, | Performed by: OTOLARYNGOLOGY

## 2021-01-04 PROCEDURE — 99499 UNLISTED E&M SERVICE: CPT | Mod: S$GLB,,, | Performed by: OTOLARYNGOLOGY

## 2021-01-04 PROCEDURE — 99999 PR PBB SHADOW E&M-EST. PATIENT-LVL IV: CPT | Mod: PBBFAC,,, | Performed by: OTOLARYNGOLOGY

## 2021-01-04 PROCEDURE — 1125F AMNT PAIN NOTED PAIN PRSNT: CPT | Mod: S$GLB,,, | Performed by: OTOLARYNGOLOGY

## 2021-01-04 PROCEDURE — 99204 PR OFFICE/OUTPT VISIT, NEW, LEVL IV, 45-59 MIN: ICD-10-PCS | Mod: S$GLB,,, | Performed by: OTOLARYNGOLOGY

## 2021-01-04 PROCEDURE — 1100F PR PT FALLS ASSESS DOC 2+ FALLS/FALL W/INJURY/YR: ICD-10-PCS | Mod: CPTII,S$GLB,, | Performed by: OTOLARYNGOLOGY

## 2021-01-04 PROCEDURE — 1125F PR PAIN SEVERITY QUANTIFIED, PAIN PRESENT: ICD-10-PCS | Mod: S$GLB,,, | Performed by: OTOLARYNGOLOGY

## 2021-01-04 PROCEDURE — 3288F FALL RISK ASSESSMENT DOCD: CPT | Mod: CPTII,S$GLB,, | Performed by: OTOLARYNGOLOGY

## 2021-01-04 PROCEDURE — 1159F MED LIST DOCD IN RCRD: CPT | Mod: S$GLB,,, | Performed by: OTOLARYNGOLOGY

## 2021-01-04 PROCEDURE — 3288F PR FALLS RISK ASSESSMENT DOCUMENTED: ICD-10-PCS | Mod: CPTII,S$GLB,, | Performed by: OTOLARYNGOLOGY

## 2021-01-04 PROCEDURE — 99999 PR PBB SHADOW E&M-EST. PATIENT-LVL IV: ICD-10-PCS | Mod: PBBFAC,,, | Performed by: OTOLARYNGOLOGY

## 2021-01-04 RX ORDER — LIDOCAINE HYDROCHLORIDE 10 MG/ML
1 INJECTION, SOLUTION EPIDURAL; INFILTRATION; INTRACAUDAL; PERINEURAL ONCE
Status: CANCELLED | OUTPATIENT
Start: 2021-01-04 | End: 2021-01-04

## 2021-01-04 RX ORDER — SODIUM CHLORIDE 0.9 % (FLUSH) 0.9 %
3 SYRINGE (ML) INJECTION
Status: CANCELLED | OUTPATIENT
Start: 2021-01-04

## 2021-01-05 ENCOUNTER — TELEPHONE (OUTPATIENT)
Dept: INTERNAL MEDICINE | Facility: CLINIC | Age: 81
End: 2021-01-05

## 2021-01-05 ENCOUNTER — ANESTHESIA EVENT (OUTPATIENT)
Dept: SURGERY | Facility: HOSPITAL | Age: 81
End: 2021-01-05
Payer: MEDICARE

## 2021-01-05 ENCOUNTER — TELEPHONE (OUTPATIENT)
Dept: OTOLARYNGOLOGY | Facility: CLINIC | Age: 81
End: 2021-01-05

## 2021-01-05 LAB — SARS-COV-2 RNA RESP QL NAA+PROBE: NOT DETECTED

## 2021-01-06 ENCOUNTER — ANESTHESIA (OUTPATIENT)
Dept: SURGERY | Facility: HOSPITAL | Age: 81
End: 2021-01-06
Payer: MEDICARE

## 2021-01-06 ENCOUNTER — HOSPITAL ENCOUNTER (OUTPATIENT)
Facility: HOSPITAL | Age: 81
Discharge: HOME | End: 2021-01-06
Attending: OTOLARYNGOLOGY | Admitting: OTOLARYNGOLOGY
Payer: MEDICARE

## 2021-01-06 VITALS
BODY MASS INDEX: 21.09 KG/M2 | HEIGHT: 63 IN | SYSTOLIC BLOOD PRESSURE: 190 MMHG | HEART RATE: 82 BPM | OXYGEN SATURATION: 100 % | TEMPERATURE: 98 F | DIASTOLIC BLOOD PRESSURE: 90 MMHG | RESPIRATION RATE: 20 BRPM | WEIGHT: 119 LBS

## 2021-01-06 DIAGNOSIS — S02.652A CLOSED FRACTURE OF LEFT MANDIBULAR ANGLE, INITIAL ENCOUNTER: Primary | ICD-10-CM

## 2021-01-06 LAB — POCT GLUCOSE: 175 MG/DL (ref 70–110)

## 2021-01-06 PROCEDURE — 21461 OPTX MNDBLR FX WO NTRDNTL: CPT | Mod: ,,, | Performed by: OTOLARYNGOLOGY

## 2021-01-06 PROCEDURE — 36000710: Performed by: OTOLARYNGOLOGY

## 2021-01-06 PROCEDURE — 25000003 PHARM REV CODE 250: Performed by: STUDENT IN AN ORGANIZED HEALTH CARE EDUCATION/TRAINING PROGRAM

## 2021-01-06 PROCEDURE — 63600175 PHARM REV CODE 636 W HCPCS: Performed by: STUDENT IN AN ORGANIZED HEALTH CARE EDUCATION/TRAINING PROGRAM

## 2021-01-06 PROCEDURE — D9220A PRA ANESTHESIA: ICD-10-PCS | Mod: CRNA,,, | Performed by: STUDENT IN AN ORGANIZED HEALTH CARE EDUCATION/TRAINING PROGRAM

## 2021-01-06 PROCEDURE — D9220A PRA ANESTHESIA: Mod: CRNA,,, | Performed by: STUDENT IN AN ORGANIZED HEALTH CARE EDUCATION/TRAINING PROGRAM

## 2021-01-06 PROCEDURE — 37000009 HC ANESTHESIA EA ADD 15 MINS: Performed by: OTOLARYNGOLOGY

## 2021-01-06 PROCEDURE — D9220A PRA ANESTHESIA: ICD-10-PCS | Mod: ANES,,, | Performed by: ANESTHESIOLOGY

## 2021-01-06 PROCEDURE — 36000711: Performed by: OTOLARYNGOLOGY

## 2021-01-06 PROCEDURE — C1713 ANCHOR/SCREW BN/BN,TIS/BN: HCPCS | Performed by: OTOLARYNGOLOGY

## 2021-01-06 PROCEDURE — 71000044 HC DOSC ROUTINE RECOVERY FIRST HOUR: Performed by: OTOLARYNGOLOGY

## 2021-01-06 PROCEDURE — 82962 GLUCOSE BLOOD TEST: CPT | Performed by: OTOLARYNGOLOGY

## 2021-01-06 PROCEDURE — D9220A PRA ANESTHESIA: Mod: ANES,,, | Performed by: ANESTHESIOLOGY

## 2021-01-06 PROCEDURE — 21461 PR OPEN RX MANDIBLE FX: ICD-10-PCS | Mod: ,,, | Performed by: OTOLARYNGOLOGY

## 2021-01-06 PROCEDURE — 27201423 OPTIME MED/SURG SUP & DEVICES STERILE SUPPLY: Performed by: OTOLARYNGOLOGY

## 2021-01-06 PROCEDURE — 63600175 PHARM REV CODE 636 W HCPCS: Performed by: OTOLARYNGOLOGY

## 2021-01-06 PROCEDURE — 25000003 PHARM REV CODE 250: Performed by: OTOLARYNGOLOGY

## 2021-01-06 PROCEDURE — 71000016 HC POSTOP RECOV ADDL HR: Performed by: OTOLARYNGOLOGY

## 2021-01-06 PROCEDURE — 37000008 HC ANESTHESIA 1ST 15 MINUTES: Performed by: OTOLARYNGOLOGY

## 2021-01-06 PROCEDURE — 71000015 HC POSTOP RECOV 1ST HR: Performed by: OTOLARYNGOLOGY

## 2021-01-06 DEVICE — IMPLANTABLE DEVICE: Type: IMPLANTABLE DEVICE | Site: MOUTH | Status: FUNCTIONAL

## 2021-01-06 DEVICE — SCREW BONE MAX 2X12MM TI SILVE: Type: IMPLANTABLE DEVICE | Site: MOUTH | Status: FUNCTIONAL

## 2021-01-06 RX ORDER — LIDOCAINE HYDROCHLORIDE AND EPINEPHRINE 10; 10 MG/ML; UG/ML
INJECTION, SOLUTION INFILTRATION; PERINEURAL
Status: DISCONTINUED | OUTPATIENT
Start: 2021-01-06 | End: 2021-01-06 | Stop reason: HOSPADM

## 2021-01-06 RX ORDER — LIDOCAINE HYDROCHLORIDE 10 MG/ML
1 INJECTION, SOLUTION EPIDURAL; INFILTRATION; INTRACAUDAL; PERINEURAL ONCE
Status: DISCONTINUED | OUTPATIENT
Start: 2021-01-06 | End: 2021-01-06 | Stop reason: HOSPADM

## 2021-01-06 RX ORDER — AMOXICILLIN AND CLAVULANATE POTASSIUM 500; 125 MG/1; MG/1
1 TABLET, FILM COATED ORAL 2 TIMES DAILY
Qty: 20 TABLET | Refills: 0 | Status: SHIPPED | OUTPATIENT
Start: 2021-01-06 | End: 2021-01-16

## 2021-01-06 RX ORDER — ONDANSETRON 2 MG/ML
INJECTION INTRAMUSCULAR; INTRAVENOUS
Status: DISCONTINUED | OUTPATIENT
Start: 2021-01-06 | End: 2021-01-06

## 2021-01-06 RX ORDER — SODIUM CHLORIDE 0.9 % (FLUSH) 0.9 %
10 SYRINGE (ML) INJECTION
Status: DISCONTINUED | OUTPATIENT
Start: 2021-01-06 | End: 2021-01-06 | Stop reason: HOSPADM

## 2021-01-06 RX ORDER — ONDANSETRON 4 MG/1
4 TABLET, ORALLY DISINTEGRATING ORAL EVERY 6 HOURS PRN
Qty: 30 TABLET | Refills: 0 | Status: SHIPPED | OUTPATIENT
Start: 2021-01-06 | End: 2021-01-14

## 2021-01-06 RX ORDER — PROPOFOL 10 MG/ML
VIAL (ML) INTRAVENOUS
Status: DISCONTINUED | OUTPATIENT
Start: 2021-01-06 | End: 2021-01-06

## 2021-01-06 RX ORDER — SUCCINYLCHOLINE CHLORIDE 20 MG/ML
INJECTION INTRAMUSCULAR; INTRAVENOUS
Status: DISCONTINUED | OUTPATIENT
Start: 2021-01-06 | End: 2021-01-06

## 2021-01-06 RX ORDER — HYDROCODONE BITARTRATE AND ACETAMINOPHEN 5; 325 MG/1; MG/1
1 TABLET ORAL EVERY 6 HOURS PRN
Qty: 30 TABLET | Refills: 0 | Status: SHIPPED | OUTPATIENT
Start: 2021-01-06 | End: 2021-04-13

## 2021-01-06 RX ORDER — SODIUM CHLORIDE 0.9 % (FLUSH) 0.9 %
3 SYRINGE (ML) INJECTION
Status: DISCONTINUED | OUTPATIENT
Start: 2021-01-06 | End: 2021-01-06 | Stop reason: HOSPADM

## 2021-01-06 RX ORDER — LIDOCAINE HYDROCHLORIDE 20 MG/ML
INJECTION INTRAVENOUS
Status: DISCONTINUED | OUTPATIENT
Start: 2021-01-06 | End: 2021-01-06

## 2021-01-06 RX ORDER — ROCURONIUM BROMIDE 10 MG/ML
INJECTION, SOLUTION INTRAVENOUS
Status: DISCONTINUED | OUTPATIENT
Start: 2021-01-06 | End: 2021-01-06

## 2021-01-06 RX ORDER — DEXAMETHASONE SODIUM PHOSPHATE 4 MG/ML
INJECTION, SOLUTION INTRA-ARTICULAR; INTRALESIONAL; INTRAMUSCULAR; INTRAVENOUS; SOFT TISSUE
Status: DISCONTINUED | OUTPATIENT
Start: 2021-01-06 | End: 2021-01-06

## 2021-01-06 RX ORDER — FENTANYL CITRATE 50 UG/ML
INJECTION, SOLUTION INTRAMUSCULAR; INTRAVENOUS
Status: DISCONTINUED | OUTPATIENT
Start: 2021-01-06 | End: 2021-01-06

## 2021-01-06 RX ORDER — CHLORHEXIDINE GLUCONATE ORAL RINSE 1.2 MG/ML
15 SOLUTION DENTAL 2 TIMES DAILY
Qty: 473 ML | Refills: 0 | Status: SHIPPED | OUTPATIENT
Start: 2021-01-06 | End: 2021-04-13

## 2021-01-06 RX ORDER — OXYMETAZOLINE HCL 0.05 %
SPRAY, NON-AEROSOL (ML) NASAL
Status: DISCONTINUED | OUTPATIENT
Start: 2021-01-06 | End: 2021-01-06

## 2021-01-06 RX ORDER — FENTANYL CITRATE 50 UG/ML
25 INJECTION, SOLUTION INTRAMUSCULAR; INTRAVENOUS EVERY 5 MIN PRN
Status: DISCONTINUED | OUTPATIENT
Start: 2021-01-06 | End: 2021-01-06 | Stop reason: HOSPADM

## 2021-01-06 RX ADMIN — FENTANYL CITRATE 25 MCG: 50 INJECTION, SOLUTION INTRAMUSCULAR; INTRAVENOUS at 11:01

## 2021-01-06 RX ADMIN — SODIUM CHLORIDE: 0.9 INJECTION, SOLUTION INTRAVENOUS at 09:01

## 2021-01-06 RX ADMIN — FENTANYL CITRATE 25 MCG: 50 INJECTION INTRAMUSCULAR; INTRAVENOUS at 03:01

## 2021-01-06 RX ADMIN — SUGAMMADEX 200 MG: 100 INJECTION, SOLUTION INTRAVENOUS at 01:01

## 2021-01-06 RX ADMIN — ROCURONIUM BROMIDE 5 MG: 10 INJECTION, SOLUTION INTRAVENOUS at 09:01

## 2021-01-06 RX ADMIN — FENTANYL CITRATE 25 MCG: 50 INJECTION INTRAMUSCULAR; INTRAVENOUS at 02:01

## 2021-01-06 RX ADMIN — SUCCINYLCHOLINE CHLORIDE 120 MG: 20 INJECTION, SOLUTION INTRAMUSCULAR; INTRAVENOUS at 09:01

## 2021-01-06 RX ADMIN — PROPOFOL 130 MG: 10 INJECTION, EMULSION INTRAVENOUS at 09:01

## 2021-01-06 RX ADMIN — ROCURONIUM BROMIDE 10 MG: 10 INJECTION, SOLUTION INTRAVENOUS at 11:01

## 2021-01-06 RX ADMIN — LIDOCAINE HYDROCHLORIDE 60 MG: 20 INJECTION, SOLUTION INTRAVENOUS at 09:01

## 2021-01-06 RX ADMIN — Medication 2 SPRAY: at 09:01

## 2021-01-06 RX ADMIN — FENTANYL CITRATE 50 MCG: 50 INJECTION, SOLUTION INTRAMUSCULAR; INTRAVENOUS at 09:01

## 2021-01-06 RX ADMIN — DEXAMETHASONE SODIUM PHOSPHATE 4 MG: 4 INJECTION, SOLUTION INTRAMUSCULAR; INTRAVENOUS at 10:01

## 2021-01-06 RX ADMIN — ONDANSETRON 4 MG: 2 INJECTION, SOLUTION INTRAMUSCULAR; INTRAVENOUS at 12:01

## 2021-01-06 RX ADMIN — DEXAMETHASONE SODIUM PHOSPHATE 8 MG: 4 INJECTION, SOLUTION INTRAMUSCULAR; INTRAVENOUS at 01:01

## 2021-01-06 RX ADMIN — AMPICILLIN SODIUM AND SULBACTAM SODIUM 3 G: 2; 1 INJECTION, POWDER, FOR SOLUTION INTRAMUSCULAR; INTRAVENOUS at 10:01

## 2021-01-06 RX ADMIN — FENTANYL CITRATE 25 MCG: 50 INJECTION, SOLUTION INTRAMUSCULAR; INTRAVENOUS at 01:01

## 2021-01-06 RX ADMIN — ROCURONIUM BROMIDE 35 MG: 10 INJECTION, SOLUTION INTRAVENOUS at 10:01

## 2021-01-07 ENCOUNTER — TELEPHONE (OUTPATIENT)
Dept: INTERNAL MEDICINE | Facility: CLINIC | Age: 81
End: 2021-01-07

## 2021-01-07 DIAGNOSIS — F02.818 LATE ONSET ALZHEIMER'S DISEASE WITH BEHAVIORAL DISTURBANCE: Primary | ICD-10-CM

## 2021-01-07 DIAGNOSIS — G30.1 LATE ONSET ALZHEIMER'S DISEASE WITH BEHAVIORAL DISTURBANCE: Primary | ICD-10-CM

## 2021-01-08 ENCOUNTER — TELEPHONE (OUTPATIENT)
Dept: INTERNAL MEDICINE | Facility: CLINIC | Age: 81
End: 2021-01-08

## 2021-01-13 ENCOUNTER — OFFICE VISIT (OUTPATIENT)
Dept: INTERNAL MEDICINE | Facility: CLINIC | Age: 81
End: 2021-01-13
Payer: MEDICARE

## 2021-01-13 VITALS
OXYGEN SATURATION: 99 % | DIASTOLIC BLOOD PRESSURE: 74 MMHG | BODY MASS INDEX: 22.43 KG/M2 | WEIGHT: 131.38 LBS | HEIGHT: 64 IN | HEART RATE: 64 BPM | SYSTOLIC BLOOD PRESSURE: 144 MMHG

## 2021-01-13 DIAGNOSIS — G30.1 LATE ONSET ALZHEIMER'S DISEASE WITH BEHAVIORAL DISTURBANCE: Primary | ICD-10-CM

## 2021-01-13 DIAGNOSIS — F02.818 LATE ONSET ALZHEIMER'S DISEASE WITH BEHAVIORAL DISTURBANCE: Primary | ICD-10-CM

## 2021-01-13 DIAGNOSIS — N18.31 STAGE 3A CHRONIC KIDNEY DISEASE: ICD-10-CM

## 2021-01-13 DIAGNOSIS — R53.81 DEBILITY: ICD-10-CM

## 2021-01-13 DIAGNOSIS — R73.03 PREDIABETES: ICD-10-CM

## 2021-01-13 PROCEDURE — 1159F PR MEDICATION LIST DOCUMENTED IN MEDICAL RECORD: ICD-10-PCS | Mod: S$GLB,,, | Performed by: FAMILY MEDICINE

## 2021-01-13 PROCEDURE — 99999 PR PBB SHADOW E&M-EST. PATIENT-LVL IV: CPT | Mod: PBBFAC,,, | Performed by: FAMILY MEDICINE

## 2021-01-13 PROCEDURE — 1100F PTFALLS ASSESS-DOCD GE2>/YR: CPT | Mod: CPTII,S$GLB,, | Performed by: FAMILY MEDICINE

## 2021-01-13 PROCEDURE — 1125F AMNT PAIN NOTED PAIN PRSNT: CPT | Mod: S$GLB,,, | Performed by: FAMILY MEDICINE

## 2021-01-13 PROCEDURE — 3288F FALL RISK ASSESSMENT DOCD: CPT | Mod: CPTII,S$GLB,, | Performed by: FAMILY MEDICINE

## 2021-01-13 PROCEDURE — 99214 OFFICE O/P EST MOD 30 MIN: CPT | Mod: S$GLB,,, | Performed by: FAMILY MEDICINE

## 2021-01-13 PROCEDURE — 3288F PR FALLS RISK ASSESSMENT DOCUMENTED: ICD-10-PCS | Mod: CPTII,S$GLB,, | Performed by: FAMILY MEDICINE

## 2021-01-13 PROCEDURE — 1159F MED LIST DOCD IN RCRD: CPT | Mod: S$GLB,,, | Performed by: FAMILY MEDICINE

## 2021-01-13 PROCEDURE — 99214 PR OFFICE/OUTPT VISIT, EST, LEVL IV, 30-39 MIN: ICD-10-PCS | Mod: S$GLB,,, | Performed by: FAMILY MEDICINE

## 2021-01-13 PROCEDURE — 1125F PR PAIN SEVERITY QUANTIFIED, PAIN PRESENT: ICD-10-PCS | Mod: S$GLB,,, | Performed by: FAMILY MEDICINE

## 2021-01-13 PROCEDURE — 1100F PR PT FALLS ASSESS DOC 2+ FALLS/FALL W/INJURY/YR: ICD-10-PCS | Mod: CPTII,S$GLB,, | Performed by: FAMILY MEDICINE

## 2021-01-13 PROCEDURE — 99999 PR PBB SHADOW E&M-EST. PATIENT-LVL IV: ICD-10-PCS | Mod: PBBFAC,,, | Performed by: FAMILY MEDICINE

## 2021-01-13 PROCEDURE — 99499 RISK ADDL DX/OHS AUDIT: ICD-10-PCS | Mod: S$GLB,,, | Performed by: FAMILY MEDICINE

## 2021-01-13 PROCEDURE — 99499 UNLISTED E&M SERVICE: CPT | Mod: S$GLB,,, | Performed by: FAMILY MEDICINE

## 2021-01-14 ENCOUNTER — OFFICE VISIT (OUTPATIENT)
Dept: OTOLARYNGOLOGY | Facility: CLINIC | Age: 81
End: 2021-01-14
Payer: MEDICARE

## 2021-01-14 VITALS — HEART RATE: 62 BPM | DIASTOLIC BLOOD PRESSURE: 71 MMHG | SYSTOLIC BLOOD PRESSURE: 142 MMHG

## 2021-01-14 DIAGNOSIS — S02.621A CLOSED SUBCONDYLAR FRACTURE OF RIGHT SIDE OF MANDIBLE, INITIAL ENCOUNTER: ICD-10-CM

## 2021-01-14 DIAGNOSIS — S02.652A CLOSED FRACTURE OF LEFT MANDIBULAR ANGLE, INITIAL ENCOUNTER: Primary | ICD-10-CM

## 2021-01-14 PROCEDURE — 99024 PR POST-OP FOLLOW-UP VISIT: ICD-10-PCS | Mod: S$GLB,,, | Performed by: OTOLARYNGOLOGY

## 2021-01-14 PROCEDURE — 1126F AMNT PAIN NOTED NONE PRSNT: CPT | Mod: S$GLB,,, | Performed by: OTOLARYNGOLOGY

## 2021-01-14 PROCEDURE — 1126F PR PAIN SEVERITY QUANTIFIED, NO PAIN PRESENT: ICD-10-PCS | Mod: S$GLB,,, | Performed by: OTOLARYNGOLOGY

## 2021-01-14 PROCEDURE — 99024 POSTOP FOLLOW-UP VISIT: CPT | Mod: S$GLB,,, | Performed by: OTOLARYNGOLOGY

## 2021-02-09 ENCOUNTER — OFFICE VISIT (OUTPATIENT)
Dept: OTOLARYNGOLOGY | Facility: CLINIC | Age: 81
End: 2021-02-09
Payer: MEDICARE

## 2021-02-09 VITALS — HEART RATE: 64 BPM | SYSTOLIC BLOOD PRESSURE: 154 MMHG | DIASTOLIC BLOOD PRESSURE: 82 MMHG

## 2021-02-09 DIAGNOSIS — S02.609A: Primary | ICD-10-CM

## 2021-02-09 DIAGNOSIS — S02.611A FRACTURE OF CONDYLAR PROCESS OF RIGHT MANDIBLE, INITIAL ENCOUNTER FOR CLOSED FRACTURE: ICD-10-CM

## 2021-02-09 PROCEDURE — 99024 POSTOP FOLLOW-UP VISIT: CPT | Mod: S$GLB,,, | Performed by: OTOLARYNGOLOGY

## 2021-02-09 PROCEDURE — 99024 PR POST-OP FOLLOW-UP VISIT: ICD-10-PCS | Mod: S$GLB,,, | Performed by: OTOLARYNGOLOGY

## 2021-02-09 PROCEDURE — 1126F AMNT PAIN NOTED NONE PRSNT: CPT | Mod: S$GLB,,, | Performed by: OTOLARYNGOLOGY

## 2021-02-09 PROCEDURE — 1126F PR PAIN SEVERITY QUANTIFIED, NO PAIN PRESENT: ICD-10-PCS | Mod: S$GLB,,, | Performed by: OTOLARYNGOLOGY

## 2021-04-08 ENCOUNTER — TELEPHONE (OUTPATIENT)
Dept: NEUROLOGY | Facility: CLINIC | Age: 81
End: 2021-04-08

## 2021-04-13 ENCOUNTER — OFFICE VISIT (OUTPATIENT)
Dept: INTERNAL MEDICINE | Facility: CLINIC | Age: 81
End: 2021-04-13
Payer: MEDICARE

## 2021-04-13 VITALS
WEIGHT: 129.44 LBS | BODY MASS INDEX: 22.1 KG/M2 | DIASTOLIC BLOOD PRESSURE: 78 MMHG | HEART RATE: 75 BPM | HEIGHT: 64 IN | OXYGEN SATURATION: 99 % | SYSTOLIC BLOOD PRESSURE: 134 MMHG

## 2021-04-13 DIAGNOSIS — I10 ESSENTIAL HYPERTENSION: ICD-10-CM

## 2021-04-13 DIAGNOSIS — R73.03 PREDIABETES: ICD-10-CM

## 2021-04-13 DIAGNOSIS — F02.818 LATE ONSET ALZHEIMER'S DISEASE WITH BEHAVIORAL DISTURBANCE: ICD-10-CM

## 2021-04-13 DIAGNOSIS — N18.31 STAGE 3A CHRONIC KIDNEY DISEASE: ICD-10-CM

## 2021-04-13 DIAGNOSIS — G30.1 LATE ONSET ALZHEIMER'S DISEASE WITH BEHAVIORAL DISTURBANCE: ICD-10-CM

## 2021-04-13 DIAGNOSIS — Z79.899 DRUG THERAPY: Primary | ICD-10-CM

## 2021-04-13 PROCEDURE — 99214 PR OFFICE/OUTPT VISIT, EST, LEVL IV, 30-39 MIN: ICD-10-PCS | Mod: S$GLB,,, | Performed by: FAMILY MEDICINE

## 2021-04-13 PROCEDURE — 3288F PR FALLS RISK ASSESSMENT DOCUMENTED: ICD-10-PCS | Mod: CPTII,S$GLB,, | Performed by: FAMILY MEDICINE

## 2021-04-13 PROCEDURE — 99499 RISK ADDL DX/OHS AUDIT: ICD-10-PCS | Mod: S$GLB,,, | Performed by: FAMILY MEDICINE

## 2021-04-13 PROCEDURE — 1125F PR PAIN SEVERITY QUANTIFIED, PAIN PRESENT: ICD-10-PCS | Mod: S$GLB,,, | Performed by: FAMILY MEDICINE

## 2021-04-13 PROCEDURE — 1159F PR MEDICATION LIST DOCUMENTED IN MEDICAL RECORD: ICD-10-PCS | Mod: S$GLB,,, | Performed by: FAMILY MEDICINE

## 2021-04-13 PROCEDURE — 1100F PTFALLS ASSESS-DOCD GE2>/YR: CPT | Mod: CPTII,S$GLB,, | Performed by: FAMILY MEDICINE

## 2021-04-13 PROCEDURE — 1159F MED LIST DOCD IN RCRD: CPT | Mod: S$GLB,,, | Performed by: FAMILY MEDICINE

## 2021-04-13 PROCEDURE — 99214 OFFICE O/P EST MOD 30 MIN: CPT | Mod: S$GLB,,, | Performed by: FAMILY MEDICINE

## 2021-04-13 PROCEDURE — 99999 PR PBB SHADOW E&M-EST. PATIENT-LVL III: ICD-10-PCS | Mod: PBBFAC,,, | Performed by: FAMILY MEDICINE

## 2021-04-13 PROCEDURE — 99999 PR PBB SHADOW E&M-EST. PATIENT-LVL III: CPT | Mod: PBBFAC,,, | Performed by: FAMILY MEDICINE

## 2021-04-13 PROCEDURE — 99499 UNLISTED E&M SERVICE: CPT | Mod: S$GLB,,, | Performed by: FAMILY MEDICINE

## 2021-04-13 PROCEDURE — 3288F FALL RISK ASSESSMENT DOCD: CPT | Mod: CPTII,S$GLB,, | Performed by: FAMILY MEDICINE

## 2021-04-13 PROCEDURE — 1100F PR PT FALLS ASSESS DOC 2+ FALLS/FALL W/INJURY/YR: ICD-10-PCS | Mod: CPTII,S$GLB,, | Performed by: FAMILY MEDICINE

## 2021-04-13 PROCEDURE — 1125F AMNT PAIN NOTED PAIN PRSNT: CPT | Mod: S$GLB,,, | Performed by: FAMILY MEDICINE

## 2021-04-14 DIAGNOSIS — G47.00 INSOMNIA, UNSPECIFIED TYPE: ICD-10-CM

## 2021-04-14 RX ORDER — TRAZODONE HYDROCHLORIDE 50 MG/1
TABLET ORAL
Qty: 90 TABLET | Refills: 3 | Status: SHIPPED | OUTPATIENT
Start: 2021-04-14 | End: 2022-09-07 | Stop reason: ALTCHOICE

## 2021-07-28 ENCOUNTER — TELEPHONE (OUTPATIENT)
Dept: NEUROLOGY | Facility: CLINIC | Age: 81
End: 2021-07-28

## 2022-02-14 ENCOUNTER — PES CALL (OUTPATIENT)
Dept: ADMINISTRATIVE | Facility: CLINIC | Age: 82
End: 2022-02-14
Payer: MEDICARE

## 2022-04-14 ENCOUNTER — PES CALL (OUTPATIENT)
Dept: ADMINISTRATIVE | Facility: CLINIC | Age: 82
End: 2022-04-14
Payer: MEDICARE

## 2022-04-18 ENCOUNTER — TELEPHONE (OUTPATIENT)
Dept: INTERNAL MEDICINE | Facility: CLINIC | Age: 82
End: 2022-04-18
Payer: MEDICARE

## 2022-04-18 ENCOUNTER — OFFICE VISIT (OUTPATIENT)
Dept: INTERNAL MEDICINE | Facility: CLINIC | Age: 82
End: 2022-04-18
Payer: MEDICARE

## 2022-04-18 VITALS
DIASTOLIC BLOOD PRESSURE: 78 MMHG | BODY MASS INDEX: 18.06 KG/M2 | WEIGHT: 105.81 LBS | HEART RATE: 85 BPM | RESPIRATION RATE: 16 BRPM | SYSTOLIC BLOOD PRESSURE: 145 MMHG | HEIGHT: 64 IN

## 2022-04-18 DIAGNOSIS — R63.4 WEIGHT LOSS, ABNORMAL: ICD-10-CM

## 2022-04-18 DIAGNOSIS — Z00.00 ENCOUNTER FOR PREVENTIVE HEALTH EXAMINATION: Primary | ICD-10-CM

## 2022-04-18 DIAGNOSIS — M15.9 OSTEOARTHRITIS OF MULTIPLE JOINTS, UNSPECIFIED OSTEOARTHRITIS TYPE: ICD-10-CM

## 2022-04-18 DIAGNOSIS — E78.5 HYPERLIPIDEMIA, UNSPECIFIED HYPERLIPIDEMIA TYPE: ICD-10-CM

## 2022-04-18 DIAGNOSIS — I70.0 AORTIC ATHEROSCLEROSIS: ICD-10-CM

## 2022-04-18 DIAGNOSIS — F02.818 LATE ONSET ALZHEIMER'S DISEASE WITH BEHAVIORAL DISTURBANCE: ICD-10-CM

## 2022-04-18 DIAGNOSIS — R73.03 PREDIABETES: ICD-10-CM

## 2022-04-18 DIAGNOSIS — R63.6 UNDERWEIGHT: ICD-10-CM

## 2022-04-18 DIAGNOSIS — I10 ESSENTIAL HYPERTENSION: ICD-10-CM

## 2022-04-18 DIAGNOSIS — N18.31 STAGE 3A CHRONIC KIDNEY DISEASE: ICD-10-CM

## 2022-04-18 DIAGNOSIS — R53.81 DEBILITY: ICD-10-CM

## 2022-04-18 DIAGNOSIS — G30.1 LATE ONSET ALZHEIMER'S DISEASE WITH BEHAVIORAL DISTURBANCE: ICD-10-CM

## 2022-04-18 PROBLEM — S02.652A CLOSED FRACTURE OF LEFT MANDIBULAR ANGLE: Status: RESOLVED | Noted: 2021-01-06 | Resolved: 2022-04-18

## 2022-04-18 PROBLEM — S02.609A CLOSED FRACTURE OF LEFT SIDE OF MANDIBLE: Status: RESOLVED | Noted: 2020-12-31 | Resolved: 2022-04-18

## 2022-04-18 PROCEDURE — 1101F PR PT FALLS ASSESS DOC 0-1 FALLS W/OUT INJ PAST YR: ICD-10-PCS | Mod: CPTII,S$GLB,, | Performed by: NURSE PRACTITIONER

## 2022-04-18 PROCEDURE — 1125F AMNT PAIN NOTED PAIN PRSNT: CPT | Mod: CPTII,S$GLB,, | Performed by: NURSE PRACTITIONER

## 2022-04-18 PROCEDURE — G0439 PPPS, SUBSEQ VISIT: HCPCS | Mod: S$GLB,,, | Performed by: NURSE PRACTITIONER

## 2022-04-18 PROCEDURE — 99499 UNLISTED E&M SERVICE: CPT | Mod: S$GLB,,, | Performed by: NURSE PRACTITIONER

## 2022-04-18 PROCEDURE — 3288F PR FALLS RISK ASSESSMENT DOCUMENTED: ICD-10-PCS | Mod: CPTII,S$GLB,, | Performed by: NURSE PRACTITIONER

## 2022-04-18 PROCEDURE — 3077F PR MOST RECENT SYSTOLIC BLOOD PRESSURE >= 140 MM HG: ICD-10-PCS | Mod: CPTII,S$GLB,, | Performed by: NURSE PRACTITIONER

## 2022-04-18 PROCEDURE — 3078F DIAST BP <80 MM HG: CPT | Mod: CPTII,S$GLB,, | Performed by: NURSE PRACTITIONER

## 2022-04-18 PROCEDURE — 1160F PR REVIEW ALL MEDS BY PRESCRIBER/CLIN PHARMACIST DOCUMENTED: ICD-10-PCS | Mod: CPTII,S$GLB,, | Performed by: NURSE PRACTITIONER

## 2022-04-18 PROCEDURE — 3078F PR MOST RECENT DIASTOLIC BLOOD PRESSURE < 80 MM HG: ICD-10-PCS | Mod: CPTII,S$GLB,, | Performed by: NURSE PRACTITIONER

## 2022-04-18 PROCEDURE — 1125F PR PAIN SEVERITY QUANTIFIED, PAIN PRESENT: ICD-10-PCS | Mod: CPTII,S$GLB,, | Performed by: NURSE PRACTITIONER

## 2022-04-18 PROCEDURE — 1170F PR FUNCTIONAL STATUS ASSESSED: ICD-10-PCS | Mod: CPTII,S$GLB,, | Performed by: NURSE PRACTITIONER

## 2022-04-18 PROCEDURE — 1159F MED LIST DOCD IN RCRD: CPT | Mod: CPTII,S$GLB,, | Performed by: NURSE PRACTITIONER

## 2022-04-18 PROCEDURE — 1159F PR MEDICATION LIST DOCUMENTED IN MEDICAL RECORD: ICD-10-PCS | Mod: CPTII,S$GLB,, | Performed by: NURSE PRACTITIONER

## 2022-04-18 PROCEDURE — 1160F RVW MEDS BY RX/DR IN RCRD: CPT | Mod: CPTII,S$GLB,, | Performed by: NURSE PRACTITIONER

## 2022-04-18 PROCEDURE — 99999 PR PBB SHADOW E&M-EST. PATIENT-LVL III: ICD-10-PCS | Mod: PBBFAC,,, | Performed by: NURSE PRACTITIONER

## 2022-04-18 PROCEDURE — 1170F FXNL STATUS ASSESSED: CPT | Mod: CPTII,S$GLB,, | Performed by: NURSE PRACTITIONER

## 2022-04-18 PROCEDURE — 99999 PR PBB SHADOW E&M-EST. PATIENT-LVL III: CPT | Mod: PBBFAC,,, | Performed by: NURSE PRACTITIONER

## 2022-04-18 PROCEDURE — 3077F SYST BP >= 140 MM HG: CPT | Mod: CPTII,S$GLB,, | Performed by: NURSE PRACTITIONER

## 2022-04-18 PROCEDURE — G0439 PR MEDICARE ANNUAL WELLNESS SUBSEQUENT VISIT: ICD-10-PCS | Mod: S$GLB,,, | Performed by: NURSE PRACTITIONER

## 2022-04-18 PROCEDURE — 3288F FALL RISK ASSESSMENT DOCD: CPT | Mod: CPTII,S$GLB,, | Performed by: NURSE PRACTITIONER

## 2022-04-18 PROCEDURE — 99499 RISK ADDL DX/OHS AUDIT: ICD-10-PCS | Mod: S$GLB,,, | Performed by: NURSE PRACTITIONER

## 2022-04-18 PROCEDURE — 1101F PT FALLS ASSESS-DOCD LE1/YR: CPT | Mod: CPTII,S$GLB,, | Performed by: NURSE PRACTITIONER

## 2022-04-18 NOTE — Clinical Note
Hello, this patient's daughter states your office has received her medical records from her previous PCP in [place]. Do you have any record of this or can you can them into media if so? I didn't see them located anywhere.  Thanks, Tamraa Cavazos, NP Internal Medicine

## 2022-04-18 NOTE — Clinical Note
Medicare AWV completed. HM not updated bc we have no records from her PCP in [place]. Daughter was upset I could not see outside records. I had them fill out a release and will fax. She has lost a significant amount of weight, I instructed daughter to schedule annual with you asap to f/u re weight but they did not make appt.  --Tamara

## 2022-04-18 NOTE — TELEPHONE ENCOUNTER
----- Message from Courtney Boyle sent at 4/18/2022 10:30 AM CDT -----  Regarding: information  Contact: Najma bobby [phone]  Calling to provide information for   Dr. Tato Hernandez  Fax to [phone]

## 2022-04-18 NOTE — PROGRESS NOTES
"Ally Steiner presented for a  Medicare AWV and comprehensive Health Risk Assessment today. She presents today with her daughter who provides some of the history. The following components were reviewed and updated:    · Medical history  · Family History  · Social history  · Allergies and Current Medications  · Health Risk Assessment  · Health Maintenance  · Care Team         ** See Completed Assessments for Annual Wellness Visit within the encounter summary.**         The following assessments were completed:  · Living Situation  · CAGE  · Depression Screening  · Timed Get Up and Go  · Whisper Test  · Cognitive Function Screening - N/A Alzheimer's   · Nutrition Screening - +abnormal weight loss, underweight. Daughter states it is difficult to get her mother to eat enough.  · ADL Screening - independent with most self care tasks. Needs assistance with higher levels of function (e.g. paying bills, coordinating medications, etc).  · PAQ Screening        Vitals:    04/18/22 0933   BP: (!) 145/78   BP Location: Right arm   Patient Position: Sitting   BP Method: Medium (Manual)   Pulse: 85   Resp: 16   Weight: 48 kg (105 lb 13.1 oz)   Height: 5' 4" (1.626 m)     Body mass index is 18.16 kg/m².     Physical Exam  Vitals reviewed.   Constitutional:       Appearance: Normal appearance.      Comments: Thin.   HENT:      Head: Normocephalic.   Cardiovascular:      Rate and Rhythm: Normal rate and regular rhythm.   Pulmonary:      Effort: Pulmonary effort is normal.   Abdominal:      General: Bowel sounds are normal.   Musculoskeletal:         General: Normal range of motion.      Cervical back: Normal range of motion.      Right lower leg: No edema.      Left lower leg: No edema.   Skin:     General: Skin is warm and dry.      Capillary Refill: Capillary refill takes less than 2 seconds.   Neurological:      Mental Status: She is alert. Mental status is at baseline.               Diagnoses and health risks identified " today and associated recommendations/orders:    1. Encounter for preventive health examination  Assessments completed as appropriate.  HM recommendations reviewed. HM deferred at this time until records are received from previous PCP in [place]. Of note, daughter was extremely upset our office has not requested records from previous PCP. She states psychiatry has prev records but I do not see these have been scanned into our system under media. Filled out records request form with daughter and release was signed. Will fax records request when daughter contacts prev PCP to obtain fax number. Will also reach out to psych dept to request records be scanned into media if they have received them.  Schedule annual with PCP.    2. Aortic atherosclerosis  Chronic, stable on current regimen. Followed by PCP. Not on statin.    3. Late onset Alzheimer's disease with behavioral disturbance  Chronic, stable on current regimen. Followed by neurology.    4. Stage 3a chronic kidney disease  Chronic, stable on current regimen. Followed by PCP.    5. Essential hypertension  Chronic, stable. BP slightly elevated today in clinic. Daughter states she is anxious at the drs office. Has home BP cuff, instructed to start BP log several times a week, bring to next PCP visit. If consistently above 130/80s, call PCP. ED precautions reviewed. Followed by PCP.    6. Hyperlipidemia, unspecified hyperlipidemia type  Chronic, stable on current regimen. Followed by PCP.    7. Prediabetes  Chronic, stable on current regimen. Followed by PCP.    8. Osteoarthritis of multiple joints, unspecified osteoarthritis type  Chronic, stable on current regimen. Followed by PCP.    9. Debility  Chronic, stable. +abnormal weight loss, discussed increasing calorie intake, regulating BMs to aid digestion. Needs f/u with PCP to r/o other metabolic reasons for weight loss. Consider f/u with neurology to discuss appetite stimulants if weight loss persists. Followed  by PCP.    10. Underweight  Chronic, stable. See above. Followed by PCP.    11. Weight loss, abnormal  Chronic, stable. See above. Followed by PCP.      Provided Ally with a 5-10 year written screening schedule and personal prevention plan. Recommendations were developed using the USPSTF age appropriate recommendations. Education, counseling, and referrals were provided as needed. After Visit Summary printed and given to patient which includes a list of additional screenings\tests needed.    Follow up in about 1 year (around 4/18/2023) for Medicare AWV and with PCP as instructed.       Tamara Cavazos, CHRIS     I offered to discuss advanced care planning, including how to pick a person who would make decisions for you if you were unable to make them for yourself, called a health care power of , and what kind of decisions you might make such as use of life sustaining treatments such as ventilators and tube feeding when faced with a life limiting illness recorded on a living will that they will need to know. (How you want to be cared for as you near the end of your natural life)     X  Patient is unable to engage in a discussion regarding advanced directives due to severe physical and/or cognitive impairment.

## 2022-04-18 NOTE — PATIENT INSTRUCTIONS
1. Schedule annual with primary care drRy    2. Focus on eating more calories or drinking meal replacement shakes.    3. Check blood pressure and keep a log. If consistently above 130/80s, call PCP. If severe headache, shortness of breath, visual changes please report to ED.    4. Add fiber supplement (Benefiber) and hydrate to help move the bowels.    Counseling and Referral of Other Preventative  (Italic type indicates deductible and co-insurance are waived)    Patient Name: Ally Steiner  Today's Date: 4/18/2022    Health Maintenance         Date Due Completion Date    TETANUS VACCINE Never done ---    DEXA Scan Never done ---    Shingles Vaccine (1 of 2) Never done ---    Pneumococcal Vaccines (Age 65+) (1 - PCV) Never done ---    Hemoglobin A1c 01/09/2021 7/9/2020    Influenza Vaccine (1) Never done ---    COVID-19 Vaccine (3 - Booster for Moderna series) 09/11/2021 4/11/2021    Lipid Panel 07/09/2025 7/9/2020          No orders of the defined types were placed in this encounter.    The following information is provided to all patients.  This information is to help you find resources for any of the problems found today that may be affecting your health:                Living healthy guide: www.[facility].[place].gov      Understanding Diabetes: www.diabetes.org      Eating healthy: www.cdc.gov/healthyweight      CDC home safety checklist: www.cdc.gov/steadi/patient.html      Agency on Aging: www.goea.[place].[facility]      Alcoholics anonymous (AA): www.aa.org      Physical Activity: www.rajani.nih.gov/xs5ivhe      Tobacco use: www.quitwithusla.org

## 2022-04-19 NOTE — TELEPHONE ENCOUNTER
Completed records release with patient and daughter at visit. Faxed to number provided below. Will await response.

## 2022-05-02 ENCOUNTER — TELEPHONE (OUTPATIENT)
Dept: INTERNAL MEDICINE | Facility: CLINIC | Age: 82
End: 2022-05-02
Payer: MEDICARE

## 2022-05-02 NOTE — TELEPHONE ENCOUNTER
----- Message from Choco Truong sent at 5/2/2022 11:37 AM CDT -----  Pt daughter  is calling to ask that you refax a medical records release form to Dr Hernandez at fax number [phone] as they did not receive the first medical records release request.Please advise

## 2022-05-10 ENCOUNTER — TELEPHONE (OUTPATIENT)
Dept: INTERNAL MEDICINE | Facility: CLINIC | Age: 82
End: 2022-05-10
Payer: MEDICARE

## 2022-05-10 NOTE — TELEPHONE ENCOUNTER
----- Message from Vonnie Kolb sent at 5/9/2022  2:11 PM CDT -----  Contact: Dionna (daughter)-[phone]  Dionna is requesting a callback as soon as possible regarding her mother. She would like to be advised if the pt can see the doctor before July because she needs to get some medication that will help her eat and drink water.    Callback number: Dionna (daughter)-[phone]

## 2022-05-29 ENCOUNTER — HOSPITAL ENCOUNTER (EMERGENCY)
Facility: HOSPITAL | Age: 82
Discharge: HOME | End: 2022-05-29
Attending: EMERGENCY MEDICINE
Payer: MEDICARE

## 2022-05-29 VITALS
WEIGHT: 105.81 LBS | RESPIRATION RATE: 17 BRPM | BODY MASS INDEX: 18.16 KG/M2 | HEART RATE: 72 BPM | OXYGEN SATURATION: 99 % | DIASTOLIC BLOOD PRESSURE: 79 MMHG | TEMPERATURE: 98 F | SYSTOLIC BLOOD PRESSURE: 140 MMHG

## 2022-05-29 DIAGNOSIS — M79.651 RIGHT THIGH PAIN: ICD-10-CM

## 2022-05-29 DIAGNOSIS — S50.12XA CONTUSION OF LEFT FOREARM: ICD-10-CM

## 2022-05-29 DIAGNOSIS — R93.0 RIGHT MAXILLARY SINUS OPACIFICATION: ICD-10-CM
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
## 2022-05-29 PROCEDURE — 99285 EMERGENCY DEPT VISIT HI MDM: CPT | Mod: 25,ER

## 2022-05-29 PROCEDURE — 25000003 PHARM REV CODE 250: Mod: ER | Performed by: EMERGENCY MEDICINE

## 2022-05-29 RX ORDER — ACETAMINOPHEN 500 MG
1000 TABLET ORAL
Status: COMPLETED | OUTPATIENT
Start: 2022-05-29 | End: 2022-05-29

## 2022-05-29 RX ORDER — AMOXICILLIN AND CLAVULANATE POTASSIUM 875; 125 MG/1; MG/1
1 TABLET, FILM COATED ORAL 2 TIMES DAILY
Qty: 14 TABLET | Refills: 0 | Status: SHIPPED | OUTPATIENT
Start: 2022-05-29 | End: 2022-10-26

## 2022-05-29 RX ORDER — AMOXICILLIN AND CLAVULANATE POTASSIUM 875; 125 MG/1; MG/1
1 TABLET, FILM COATED ORAL
Status: COMPLETED | OUTPATIENT
Start: 2022-05-29 | End: 2022-05-29

## 2022-05-29 RX ADMIN — ACETAMINOPHEN 1000 MG: 500 TABLET ORAL at 09:05

## 2022-05-29 RX ADMIN — AMOXICILLIN AND CLAVULANATE POTASSIUM 1 TABLET: 875; 125 TABLET, FILM COATED ORAL at 10:05

## 2022-05-30 NOTE — ED PROVIDER NOTES
Encounter Date: 5/29/2022       History     Chief Complaint   Patient presents with    Fall     Patient fell at home trying to get out of bed. Has small hematoma above right eye and has bruising to left forehead above left eye and to left cheek. Family reports that she was also holding her right thigh area when they were helping her into the car. Patient AAOx4. Vitals stable. While helping patient out of her house coat, also noted a bruising to her left anterior forearm     HPI   82 y.o.    S/p mechanical fall getting out of bed  Just PTA      No LOC   bruising to face    Also R thigh and L forearm contusions    No neck nor back pain    Review of patient's allergies indicates:  No Known Allergies  Past Medical History:   Diagnosis Date    History of hypertension     always been able to manage with diet    Hx of diabetes mellitus     Always controlled with diet and weight loss    Hyperlipidemia     always been able to manage with diet    Osteoarthritis      Past Surgical History:   Procedure Laterality Date    CHOLECYSTECTOMY      Hysterectomy      KNEE SURGERY Left 2018    knee replacement    KNEE SURGERY Right 2000    knee replacement    OPEN REDUCTION OF FRACTURE OF MANDIBLE Left 1/6/2021    Procedure: OPEN REDUCTION, FRACTURE, MANDIBLE;  Surgeon: Dar Mcgill MD;  Location: [facility] OR [address] [place] [address];  Service: ENT;  Laterality: Left;    REDUCTION OF BOTH BREASTS Bilateral 1990     No family history on file.  Social History     Tobacco Use    Smoking status: Never Smoker    Smokeless tobacco: Never Used   Substance Use Topics    Alcohol use: Never    Drug use: Never     Review of Systems  All systems were reviewed/examined and were negative except as noted in the HPI.    Physical Exam     Initial Vitals [05/29/22 2128]   BP Pulse Resp Temp SpO2   (!) 140/79 92 20 98.2 °F (36.8 °C) 100 %      MAP       --         Physical Exam    General: the patient is awake, alert, and in no apparent distress.  Head:  normocephalic and bruising to L side of face, sclera are clear  Neck: supple without meningismus nt  Chest: clear to auscultation bilaterally, no respiratory distress  Heart: regular rate and rhythm  ABD soft, nontender, nondistended, no peritoneal signs  Back nt in the midline  Extremities: warm and well perfused    Ecchymosis L forearm    Mild R thigh tenderness  Skin: warm and dry  Psych conversant  Neuro: awake, alert, moving all extremities      ED Course   Procedures  Labs Reviewed - No data to display       Imaging Results          X-Ray Femur Ap/Lat Right (Final result)  Result time 05/29/22 22:33:53    Final result by Lucien Anderson MD (05/29/22 22:33:53)                 Impression:      As above      Electronically signed by: Justin Mcgraw  Date:    05/29/2022  Time:    22:33             Narrative:    EXAMINATION:  XR FEMUR 2 VIEW RIGHT    CLINICAL HISTORY:  XR FEMUR 2 VIEW RIGHTPain in right thigh    COMPARISON:  None    FINDINGS:  Multiple radiographic views  were obtained.    Right knee prosthesis.  No definite acute fracture.  Decreased bone mineral density.  Vascular calcification.  Enthesopathy involving the inferior pubic rami are not well evaluated                               X-Ray Forearm Left (Final result)  Result time 05/29/22 22:34:44    Final result by Lucien Anderson MD (05/29/22 22:34:44)                 Impression:      As above      Electronically signed by: Justin Mcgraw  Date:    05/29/2022  Time:    22:34             Narrative:    EXAMINATION:  XR FOREARM LEFT    CLINICAL HISTORY:  Contusion of left forearm, initial encounter    TECHNIQUE:  AP and lateral views of the left forearm were performed.    COMPARISON:  None    FINDINGS:  No acute fracture or dislocation.  Decreased bone mineral density.  No definite displaced osseous abnormality.                               CT Head Without Contrast (Final result)  Result time 05/29/22 22:25:48    Final result by Lucien Anderson MD (05/29/22  22:25:48)                 Impression:      Atrophy and chronic white matter changes    No evidence for intracranial hemorrhage mass effect or midline shift.    Opacification of the right maxillary sinus with expansion of the right maxillary sinus.  Correlate clinically    All CT scans   are performed using dose optimization techniques including the following: automated exposure control; adjustment of the mA and/or kV; use of iterative reconstruction technique.  Dose modulation was employed for ALARA by means of: Automated exposure control; adjustment of the mA and/or kV according to patient size (this includes techniques or standardized protocols for targeted exams where dose is matched to indication/reason for exam; i.e. extremities or head); and/or use of iterative reconstructive technique.      Electronically signed by: Justin Mcgraw  Date:    05/29/2022  Time:    22:25             Narrative:    EXAMINATION:  CT HEAD WITHOUT CONTRAST    CLINICAL HISTORY:  Head trauma, minor (Age >= 65y);    TECHNIQUE:  Low dose axial CT images obtained throughout the head without intravenous contrast. Sagittal and coronal reconstructions were performed.    COMPARISON:  None.    FINDINGS:  Atrophy and chronic white matter changes no extra-axial blood or fluid collections.    No parenchymal mass, hemorrhage, edema or major vascular distribution infarct.    Skull/extracranial contents (limited evaluation): No fracture. Chronic opacification of the right maxillary sinus which appears expanded.                               CT Maxillofacial Without Contrast (Final result)  Result time 05/29/22 22:31:58    Final result by Lucien Anderson MD (05/29/22 22:31:58)                 Impression:      Near complete opacification of the right maxillary sinus.    Fracture of the posterolateral right maxillary wall likely related to recent posttraumatic injury.    Bowing of the anterior maxillary sinus wall may relate to  posttraumatic  injury      Electronically signed by: Justin Mcgraw  Date:    05/29/2022  Time:    22:31             Narrative:    EXAMINATION:  CT MAXILLOFACIAL WITHOUT CONTRAST    CLINICAL HISTORY:  Facial trauma, blunt;    TECHNIQUE:  Low dose axial images, sagittal and coronal reformations were obtained through the face.  Contrast was not administered.    COMPARISON:  None    FINDINGS:  Complete opacification of the right maxillary sinus with curvilinear hyperdensity within it.  The right maxillary sinus appears hyperexpanded.  Bowing of the posterolateral maxillary sinus wall consistent with fracture.  Atherosclerotic changes.  Bowing of the anterior maxillary sinus wall may relate to  posttraumatic injury                                 Medications   acetaminophen tablet 1,000 mg (1,000 mg Oral Given 5/29/22 2137)   amoxicillin-clavulanate 875-125mg per tablet 1 tablet (1 tablet Oral Given 5/29/22 8352)          Medical Decision Making:    This is an emergent evaluation of a patient presenting to the ED.  Nursing notes were reviewed.  I personally reviewed, read, and interpreted the ECG and any monitoring strips.  I reviewed radiology images personally along with interpretations.  R sided ?able max sinus fx  I personally reviewed and interpreted the laboratory results.  I decided to obtain and review old medical records, which showed: well care    Evaluation for Emergency Medical Condition  The patient received a medical screening exam and within a reasonable degree of clinical confidence an emergency medical condition has not been identified.  The patient is instructed on proper follow up and return precautions to the ED.    The patient was encouraged strongly to get the COVID-19 vaccine either after asymptomatic (if COVID positive) or offered it here in the ED is COVID negative.    abx  ent referral    Ti Kolb MD, HANNAH                   Clinical Impression:   Final diagnoses:  [M79.651] Right thigh pain  [S50.12XA]  Contusion of left forearm  [W19.XXXA] Fall, initial encounter (Primary)  [R93.0] Right maxillary sinus opacification          ED Disposition Condition    Discharge Stable        ED Prescriptions     Medication Sig Dispense Start Date End Date Auth. Provider    amoxicillin-clavulanate 875-125mg (AUGMENTIN) 875-125 mg per tablet Take 1 tablet by mouth 2 (two) times daily. 14 tablet 5/29/2022  Josh Kolb MD        Follow-up Information     Follow up With Specialties Details Why Contact Info    Juarez Segal MD Family Medicine Schedule an appointment as soon as possible for a visit   [address] [facility] [address]  [phone]      [facility] ENT Otolaryngology Schedule an appointment as soon as possible for a visit   [address] [facility] [address] [phone]        Discharged to home in stable condition, return to ED warnings given, follow up and patient care instructions given.      Ti Kolb MD, HANNAH, FACEP  Department of Emergency Medicine       Josh Kolb MD  05/30/22 0311

## 2022-05-31 ENCOUNTER — TELEPHONE (OUTPATIENT)
Dept: FAMILY MEDICINE | Facility: CLINIC | Age: 82
End: 2022-05-31
Payer: MEDICARE

## 2022-06-16 ENCOUNTER — OFFICE VISIT (OUTPATIENT)
Dept: OTOLARYNGOLOGY | Facility: CLINIC | Age: 82
End: 2022-06-16
Payer: MEDICARE

## 2022-06-16 VITALS — TEMPERATURE: 98 F | SYSTOLIC BLOOD PRESSURE: 152 MMHG | DIASTOLIC BLOOD PRESSURE: 83 MMHG | HEART RATE: 86 BPM

## 2022-06-16 DIAGNOSIS — R93.0 RIGHT MAXILLARY SINUS OPACIFICATION: ICD-10-CM
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
## 2022-06-16 PROCEDURE — 1100F PTFALLS ASSESS-DOCD GE2>/YR: CPT | Mod: CPTII,S$GLB,, | Performed by: OTOLARYNGOLOGY

## 2022-06-16 PROCEDURE — 99999 PR PBB SHADOW E&M-EST. PATIENT-LVL III: ICD-10-PCS | Mod: PBBFAC,,, | Performed by: OTOLARYNGOLOGY

## 2022-06-16 PROCEDURE — 99213 PR OFFICE/OUTPT VISIT, EST, LEVL III, 20-29 MIN: ICD-10-PCS | Mod: S$GLB,,, | Performed by: OTOLARYNGOLOGY

## 2022-06-16 PROCEDURE — 3288F FALL RISK ASSESSMENT DOCD: CPT | Mod: CPTII,S$GLB,, | Performed by: OTOLARYNGOLOGY

## 2022-06-16 PROCEDURE — 3079F PR MOST RECENT DIASTOLIC BLOOD PRESSURE 80-89 MM HG: ICD-10-PCS | Mod: CPTII,S$GLB,, | Performed by: OTOLARYNGOLOGY

## 2022-06-16 PROCEDURE — 99213 OFFICE O/P EST LOW 20 MIN: CPT | Mod: S$GLB,,, | Performed by: OTOLARYNGOLOGY

## 2022-06-16 PROCEDURE — 3077F SYST BP >= 140 MM HG: CPT | Mod: CPTII,S$GLB,, | Performed by: OTOLARYNGOLOGY

## 2022-06-16 PROCEDURE — 1159F PR MEDICATION LIST DOCUMENTED IN MEDICAL RECORD: ICD-10-PCS | Mod: CPTII,S$GLB,, | Performed by: OTOLARYNGOLOGY

## 2022-06-16 PROCEDURE — 1100F PR PT FALLS ASSESS DOC 2+ FALLS/FALL W/INJURY/YR: ICD-10-PCS | Mod: CPTII,S$GLB,, | Performed by: OTOLARYNGOLOGY

## 2022-06-16 PROCEDURE — 99999 PR PBB SHADOW E&M-EST. PATIENT-LVL III: CPT | Mod: PBBFAC,,, | Performed by: OTOLARYNGOLOGY

## 2022-06-16 PROCEDURE — 1126F PR PAIN SEVERITY QUANTIFIED, NO PAIN PRESENT: ICD-10-PCS | Mod: CPTII,S$GLB,, | Performed by: OTOLARYNGOLOGY

## 2022-06-16 PROCEDURE — 3288F PR FALLS RISK ASSESSMENT DOCUMENTED: ICD-10-PCS | Mod: CPTII,S$GLB,, | Performed by: OTOLARYNGOLOGY

## 2022-06-16 PROCEDURE — 3077F PR MOST RECENT SYSTOLIC BLOOD PRESSURE >= 140 MM HG: ICD-10-PCS | Mod: CPTII,S$GLB,, | Performed by: OTOLARYNGOLOGY

## 2022-06-16 PROCEDURE — 1159F MED LIST DOCD IN RCRD: CPT | Mod: CPTII,S$GLB,, | Performed by: OTOLARYNGOLOGY

## 2022-06-16 PROCEDURE — 1126F AMNT PAIN NOTED NONE PRSNT: CPT | Mod: CPTII,S$GLB,, | Performed by: OTOLARYNGOLOGY

## 2022-06-16 PROCEDURE — 3079F DIAST BP 80-89 MM HG: CPT | Mod: CPTII,S$GLB,, | Performed by: OTOLARYNGOLOGY

## 2022-06-17 PROBLEM — R93.0 RIGHT MAXILLARY SINUS OPACIFICATION: Status: ACTIVE | Noted: 2022-06-17

## 2022-06-17 NOTE — ASSESSMENT & PLAN NOTE
Age-indeterminate fractures on CT scan.  It is possible that the findings noted on CT were related to her fall and that the opacification of her right maxillary sinus was due to posttraumatic hemorrhage.  No worrisome findings on exam today.  No fractures requiring repair.  Return p.r.n..

## 2022-06-17 NOTE — PROGRESS NOTES
Chief Complaint   Patient presents with    consult/fracture of left mandible       HPI   82 y.o. female presents several weeks status post recent fall with right facial trauma.  CT of the facial skeleton revealed opacification the right maxillary sinus and age-indeterminate fractures of anterolateral and posterolateral maxillary sinus walls on the right.  No complaints.  No facial hypesthesia.    Review of Systems   Constitutional: Negative for fatigue and unexpected weight change.   HENT: Per HPI.  Eyes: Negative for visual disturbance.   Respiratory: Negative for shortness of breath, hemoptysis   Cardiovascular: Negative for chest pain and palpitations.   Musculoskeletal: Negative for decreased ROM, back pain.   Skin: Negative for rash, sunburn, itching.   Neurological: Negative for dizziness and seizures.   Hematological: Negative for adenopathy. Does not bruise/bleed easily.   Endocrine: Negative for rapid weight loss/weight gain, heat/cold intolerance.     Past Medical History   Patient Active Problem List   Diagnosis    Osteoarthritis of multiple joints    Drug therapy     Late onset Alzheimer's disease with behavioral disturbance    Essential hypertension    Hyperlipidemia    Prediabetes    Debility    Stage 3a chronic kidney disease    Right maxillary sinus opacification           Past Surgical History   Past Surgical History:   Procedure Laterality Date    CHOLECYSTECTOMY      Hysterectomy      KNEE SURGERY Left 2018    knee replacement    KNEE SURGERY Right 2000    knee replacement    OPEN REDUCTION OF FRACTURE OF MANDIBLE Left 1/6/2021    Procedure: OPEN REDUCTION, FRACTURE, MANDIBLE;  Surgeon: Dar Mcgill MD;  Location: [facility] OR [address] [place] [address];  Service: ENT;  Laterality: Left;    REDUCTION OF BOTH BREASTS Bilateral 1990         Family History   No family history on file.        Social History   .  Social History     Socioeconomic History    Marital status:    Tobacco Use    Smoking  status: Never Smoker    Smokeless tobacco: Never Used   Substance and Sexual Activity    Alcohol use: Never    Drug use: Never     Social Determinants of Health     Financial Resource Strain: Low Risk     Difficulty of Paying Living Expenses: Not hard at all   Food Insecurity: No Food Insecurity    Worried About Running Out of Food in the Last Year: Never true    Ran Out of Food in the Last Year: Never true   Transportation Needs: No Transportation Needs    Lack of Transportation (Medical): No    Lack of Transportation (Non-Medical): No   Physical Activity: Inactive    Days of Exercise per Week: 0 days    Minutes of Exercise per Session: 0 min   Stress: Stress Concern Present    Feeling of Stress : Very much   Social Connections: Moderately Isolated    Frequency of Communication with Friends and Family: More than three times a week    Frequency of Social Gatherings with Friends and Family: More than three times a week    Attends Taoist Services: More than 4 times per year    Active Member of Clubs or Organizations: No    Attends Club or Organization Meetings: Never    Marital Status:    Housing Stability: Low Risk     Unable to Pay for Housing in the Last Year: No    Number of Places Lived in the Last Year: 1    Unstable Housing in the Last Year: No         Allergies   Review of patient's allergies indicates:  No Known Allergies        Physical Exam     Vitals:    06/16/22 1037   BP: (!) 152/83   Pulse: 86   Temp: 98.3 °F (36.8 °C)         There is no height or weight on file to calculate BMI.      General: AOx3, NAD   Respiratory:  Symmetric chest rise, normal effort  Nose: No gross nasal septal deviation. Inferior Turbinates WNL bilaterally. No septal perforation. No masses/lesions.   Oral Cavity:  Oral Tongue mobile, no lesions noted. Hard Palate WNL. No buccal or FOM lesions.  Oropharynx:  No masses/lesions of the posterior pharyngeal wall. Tonsillar fossa without lesions. Soft  palate without masses. Midline uvula.   Neck: No scars.  No cervical lymphadenopathy, thyromegaly or thyroid nodules.  Normal range of motion.    Face: House Brackmann I bilaterally.     Maxillofacial CT scan reviewed.    Assessment/Plan  Problem List Items Addressed This Visit        Other    Right maxillary sinus opacification     Age-indeterminate fractures on CT scan.  It is possible that the findings noted on CT were related to her fall and that the opacification of her right maxillary sinus was due to posttraumatic hemorrhage.  No worrisome findings on exam today.  No fractures requiring repair.  Return p.r.n..             Other Visit Diagnoses     Fall, initial encounter

## 2022-07-06 ENCOUNTER — HOSPITAL ENCOUNTER (EMERGENCY)
Facility: HOSPITAL | Age: 82
Discharge: HOME | End: 2022-07-07
Attending: EMERGENCY MEDICINE
Payer: MEDICARE

## 2022-07-06 DIAGNOSIS — R53.81 DEBILITY: ICD-10-CM

## 2022-07-06 DIAGNOSIS — T14.90XA TRAUMA: ICD-10-CM
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
## 2022-07-06 DIAGNOSIS — S22.49XA RIB FRACTURES: ICD-10-CM

## 2022-07-06 LAB
ALBUMIN SERPL BCP-MCNC: 3.1 G/DL (ref 3.5–5.2)
ALP SERPL-CCNC: 63 U/L (ref 55–135)
ALT SERPL W/O P-5'-P-CCNC: 11 U/L (ref 10–44)
ANION GAP SERPL CALC-SCNC: 9 MMOL/L (ref 8–16)
ANISOCYTOSIS BLD QL SMEAR: SLIGHT
AST SERPL-CCNC: 20 U/L (ref 10–40)
BASOPHILS # BLD AUTO: 0.02 K/UL (ref 0–0.2)
BASOPHILS NFR BLD: 0.4 % (ref 0–1.9)
BILIRUB SERPL-MCNC: 0.5 MG/DL (ref 0.1–1)
BILIRUB UR QL STRIP: NEGATIVE
BUN SERPL-MCNC: 13 MG/DL (ref 8–23)
CALCIUM SERPL-MCNC: 8.9 MG/DL (ref 8.7–10.5)
CHLORIDE SERPL-SCNC: 97 MMOL/L (ref 95–110)
CLARITY UR REFRACT.AUTO: CLEAR
CO2 SERPL-SCNC: 26 MMOL/L (ref 23–29)
COLOR UR AUTO: YELLOW
CREAT SERPL-MCNC: 0.9 MG/DL (ref 0.5–1.4)
DIFFERENTIAL METHOD: ABNORMAL
EOSINOPHIL # BLD AUTO: 0 K/UL (ref 0–0.5)
EOSINOPHIL NFR BLD: 0.5 % (ref 0–8)
ERYTHROCYTE [DISTWIDTH] IN BLOOD BY AUTOMATED COUNT: 13.2 % (ref 11.5–14.5)
EST. GFR  (AFRICAN AMERICAN): >60 ML/MIN/1.73 M^2
EST. GFR  (NON AFRICAN AMERICAN): >60 ML/MIN/1.73 M^2
GIANT PLATELETS BLD QL SMEAR: PRESENT
GLUCOSE SERPL-MCNC: 86 MG/DL (ref 70–110)
GLUCOSE UR QL STRIP: NEGATIVE
HCT VFR BLD AUTO: 29.3 % (ref 37–48.5)
HGB BLD-MCNC: 9.6 G/DL (ref 12–16)
HGB UR QL STRIP: NEGATIVE
HYPOCHROMIA BLD QL SMEAR: ABNORMAL
IMM GRANULOCYTES # BLD AUTO: 0.01 K/UL (ref 0–0.04)
IMM GRANULOCYTES NFR BLD AUTO: 0.2 % (ref 0–0.5)
INR PPP: 1.1 (ref 0.8–1.2)
KETONES UR QL STRIP: ABNORMAL
LEUKOCYTE ESTERASE UR QL STRIP: NEGATIVE
LYMPHOCYTES # BLD AUTO: 1.3 K/UL (ref 1–4.8)
LYMPHOCYTES NFR BLD: 23.8 % (ref 18–48)
MAGNESIUM SERPL-MCNC: 1.6 MG/DL (ref 1.6–2.6)
MCH RBC QN AUTO: 30.4 PG (ref 27–31)
MCHC RBC AUTO-ENTMCNC: 32.8 G/DL (ref 32–36)
MCV RBC AUTO: 93 FL (ref 82–98)
MONOCYTES # BLD AUTO: 0.5 K/UL (ref 0.3–1)
MONOCYTES NFR BLD: 8.6 % (ref 4–15)
NEUTROPHILS # BLD AUTO: 3.7 K/UL (ref 1.8–7.7)
NEUTROPHILS NFR BLD: 66.5 % (ref 38–73)
NITRITE UR QL STRIP: NEGATIVE
NRBC BLD-RTO: 0 /100 WBC
OVALOCYTES BLD QL SMEAR: ABNORMAL
PH UR STRIP: 6 [PH] (ref 5–8)
PLATELET # BLD AUTO: 240 K/UL (ref 150–450)
PLATELET BLD QL SMEAR: ABNORMAL
PMV BLD AUTO: 10.4 FL (ref 9.2–12.9)
POIKILOCYTOSIS BLD QL SMEAR: SLIGHT
POTASSIUM SERPL-SCNC: 3.9 MMOL/L (ref 3.5–5.1)
PROT SERPL-MCNC: 6.8 G/DL (ref 6–8.4)
PROT UR QL STRIP: NEGATIVE
PROTHROMBIN TIME: 11.4 SEC (ref 9–12.5)
RBC # BLD AUTO: 3.16 M/UL (ref 4–5.4)
SODIUM SERPL-SCNC: 132 MMOL/L (ref 136–145)
SP GR UR STRIP: 1.01 (ref 1–1.03)
TROPONIN I SERPL DL<=0.01 NG/ML-MCNC: 0.01 NG/ML (ref 0–0.03)
URN SPEC COLLECT METH UR: ABNORMAL
WBC # BLD AUTO: 5.58 K/UL (ref 3.9–12.7)

## 2022-07-06 PROCEDURE — 85025 COMPLETE CBC W/AUTO DIFF WBC: CPT | Performed by: EMERGENCY MEDICINE

## 2022-07-06 PROCEDURE — 99285 EMERGENCY DEPT VISIT HI MDM: CPT | Mod: ,,, | Performed by: EMERGENCY MEDICINE

## 2022-07-06 PROCEDURE — 85610 PROTHROMBIN TIME: CPT | Performed by: EMERGENCY MEDICINE

## 2022-07-06 PROCEDURE — 84484 ASSAY OF TROPONIN QUANT: CPT | Performed by: EMERGENCY MEDICINE

## 2022-07-06 PROCEDURE — 93010 ELECTROCARDIOGRAM REPORT: CPT | Mod: ,,, | Performed by: INTERNAL MEDICINE

## 2022-07-06 PROCEDURE — 93010 EKG 12-LEAD: ICD-10-PCS | Mod: ,,, | Performed by: INTERNAL MEDICINE

## 2022-07-06 PROCEDURE — 80047 BASIC METABLC PNL IONIZED CA: CPT | Mod: 59

## 2022-07-06 PROCEDURE — 81003 URINALYSIS AUTO W/O SCOPE: CPT | Performed by: EMERGENCY MEDICINE

## 2022-07-06 PROCEDURE — 99285 EMERGENCY DEPT VISIT HI MDM: CPT | Mod: 25

## 2022-07-06 PROCEDURE — 80053 COMPREHEN METABOLIC PANEL: CPT | Performed by: EMERGENCY MEDICINE

## 2022-07-06 PROCEDURE — 93005 ELECTROCARDIOGRAM TRACING: CPT

## 2022-07-06 PROCEDURE — 83735 ASSAY OF MAGNESIUM: CPT | Performed by: EMERGENCY MEDICINE

## 2022-07-06 PROCEDURE — 99285 PR EMERGENCY DEPT VISIT,LEVEL V: ICD-10-PCS | Mod: ,,, | Performed by: EMERGENCY MEDICINE

## 2022-07-07 VITALS
WEIGHT: 105 LBS | TEMPERATURE: 99 F | RESPIRATION RATE: 16 BRPM | BODY MASS INDEX: 17.49 KG/M2 | HEART RATE: 78 BPM | HEIGHT: 65 IN | OXYGEN SATURATION: 100 % | SYSTOLIC BLOOD PRESSURE: 150 MMHG | DIASTOLIC BLOOD PRESSURE: 79 MMHG

## 2022-07-07 LAB
BUN SERPL-MCNC: 18 MG/DL (ref 6–30)
CHLORIDE SERPL-SCNC: 94 MMOL/L (ref 95–110)
CREAT SERPL-MCNC: 1.1 MG/DL (ref 0.5–1.4)
GLUCOSE SERPL-MCNC: 97 MG/DL (ref 70–110)
HCT VFR BLD CALC: 32 %PCV (ref 36–54)
POC IONIZED CALCIUM: 1.25 MMOL/L (ref 1.06–1.42)
POC TCO2 (MEASURED): 30 MMOL/L (ref 23–29)
POTASSIUM BLD-SCNC: 4.4 MMOL/L (ref 3.5–5.1)
SAMPLE: ABNORMAL
SODIUM BLD-SCNC: 132 MMOL/L (ref 136–145)

## 2022-07-07 PROCEDURE — 25000003 PHARM REV CODE 250: Performed by: EMERGENCY MEDICINE

## 2022-07-07 RX ORDER — LIDOCAINE 50 MG/G
1 PATCH TOPICAL DAILY
Qty: 15 PATCH | Refills: 0 | Status: SHIPPED | OUTPATIENT
Start: 2022-07-07 | End: 2022-10-26

## 2022-07-07 RX ORDER — LIDOCAINE 50 MG/G
1 PATCH TOPICAL
Status: DISCONTINUED | OUTPATIENT
Start: 2022-07-07 | End: 2022-07-07 | Stop reason: HOSPADM

## 2022-07-07 RX ORDER — LIDOCAINE 50 MG/G
1 PATCH TOPICAL DAILY
Qty: 15 PATCH | Refills: 0 | OUTPATIENT
Start: 2022-07-07 | End: 2022-07-07 | Stop reason: SDUPTHER

## 2022-07-07 RX ADMIN — LIDOCAINE 1 PATCH: 50 PATCH CUTANEOUS at 03:07

## 2022-07-07 NOTE — ED NOTES
Bed: [facility] 02  Expected date:   Expected time:   Means of arrival:   Comments:  [place]

## 2022-07-07 NOTE — ED TRIAGE NOTES
"Pt daughter states fall approximately 1500, unsure if hit head, did not lose consciousness. States "she was using her walker and her right leg just didn't want to move and gave out." contusion noted to LUE/ LLE. Denies blood thinners. Pt states LLE pain. Denies any other complaints at this time. Hx dementia.  "

## 2022-07-07 NOTE — ED NOTES
I-STAT Chem-8+ Results:   Value Reference Range   Sodium 132 136-145 mmol/L   Potassium  4.4 3.5-5.1 mmol/L   Chloride 94  mmol/L   Ionized Calcium 1.25 1.06-1.42 mmol/L   CO2 (measured) 30 23-29 mmol/L   Glucose 97  mg/dL   BUN 18 6-30 mg/dL   Creatinine 1.1 0.5-1.4 mg/dL   Hematocrit 32 36-54%

## 2022-07-07 NOTE — MEDICAL/APP STUDENT
History     Chief Complaint   Patient presents with    Fall     Pt to ER with daughter reporting that she found patient sitting on the floor in her room this afternoon. Unsure of head injury or LOC. Denies blood thinner use. Pt has been more confused since fall. Daughter noted a bruise to left leg and left arm. Hx of dementia.      Ms Ally Steiner is an 82F with a PMHx of Alzheimer's disease who presents to ED due to fall.     Patient's daughter at bedside reports Ms Steiner fell approximately 2pm today and hit her left arm and left leg. Patient is unsure if she hit her head. Patient reports she was confused post fall and felt her left arm, left hip and both feet were all sore. She typically ambulates with a walker at home. She takes trazadone 50mg daily and acetaminophen PRN. She is not on any blood thinners.    On ROS, patient reports she feels dizzy and lightheaded. No fever, chills, HA, N/V, chest pain, abdominal pain, diarrhea, constipation or urinary symptoms reported.        Past Medical History:   Diagnosis Date    History of hypertension     always been able to manage with diet    Hx of diabetes mellitus     Always controlled with diet and weight loss    Hyperlipidemia     always been able to manage with diet    Osteoarthritis        Past Surgical History:   Procedure Laterality Date    CHOLECYSTECTOMY      Hysterectomy      KNEE SURGERY Left 2018    knee replacement    KNEE SURGERY Right 2000    knee replacement    OPEN REDUCTION OF FRACTURE OF MANDIBLE Left 1/6/2021    Procedure: OPEN REDUCTION, FRACTURE, MANDIBLE;  Surgeon: Dar Mcgill MD;  Location: [facility] OR [address] [place] [address];  Service: ENT;  Laterality: Left;    REDUCTION OF BOTH BREASTS Bilateral 1990       No family history on file.    Social History     Tobacco Use    Smoking status: Never Smoker    Smokeless tobacco: Never Used   Substance Use Topics    Alcohol use: Never    Drug use: Never       Review of Systems  "  Constitutional: Negative for chills and fever.   HENT: Negative for congestion and sore throat.    Respiratory: Negative for cough and shortness of breath.    Cardiovascular: Negative for chest pain.   Gastrointestinal: Negative for abdominal pain, constipation, diarrhea, nausea and vomiting.   Genitourinary: Negative for difficulty urinating and dysuria.   Musculoskeletal: Positive for myalgias ( Left arm, left hip and left leg).   Neurological: Positive for dizziness and light-headedness. Negative for headaches.   Psychiatric/Behavioral: Negative for agitation and confusion.       Physical Exam   /81   Pulse 87   Temp 99.4 °F (37.4 °C) (Oral)   Resp 16   Ht 5' 5" (1.651 m)   Wt 47.6 kg (105 lb)   SpO2 98%   Breastfeeding No   BMI 17.47 kg/m²     Physical Exam    Constitutional: She is not diaphoretic. No distress.   HENT:   Head: Normocephalic and atraumatic.   Eyes: Conjunctivae and EOM are normal. Pupils are equal, round, and reactive to light.   Neck: Neck supple.   Normal range of motion.  Cardiovascular: Normal rate, regular rhythm and normal heart sounds.   Pulmonary/Chest: Breath sounds normal. No respiratory distress. She has no wheezes.   Abdominal: Abdomen is soft. Bowel sounds are normal. She exhibits no distension. There is no abdominal tenderness.   Musculoskeletal:         General: Tenderness ( Tenderness upon palpation of left shoulder, left arm, left hip and femur.) present.      Cervical back: Normal range of motion and neck supple.     Neurological: She is alert.   Oriented to person and place, not time.   Skin: Skin is warm and dry.   Psychiatric: She has a normal mood and affect.         ED Course         "

## 2022-07-07 NOTE — ED PROVIDER NOTES
Encounter Date: 7/6/2022       History     Chief Complaint   Patient presents with    Fall     Pt to ER with daughter reporting that she found patient sitting on the floor in her room this afternoon. Unsure of head injury or LOC. Denies blood thinner use. Pt has been more confused since fall. Daughter noted a bruise to left leg and left arm. Hx of dementia.      83 yo F s/p fall from walker w/ seat several hours prior to arrival. Witnessed by daughter. No LOC. Progressive B hip pain, L arm pain, LLE pain. No Nausea or vomiting.No Neck pain.No chest or abdominal pain.    The history is provided by the patient and a relative.     Review of patient's allergies indicates:  No Known Allergies  Past Medical History:   Diagnosis Date    History of hypertension     always been able to manage with diet    Hx of diabetes mellitus     Always controlled with diet and weight loss    Hyperlipidemia     always been able to manage with diet    Osteoarthritis      Past Surgical History:   Procedure Laterality Date    CHOLECYSTECTOMY      Hysterectomy      KNEE SURGERY Left 2018    knee replacement    KNEE SURGERY Right 2000    knee replacement    OPEN REDUCTION OF FRACTURE OF MANDIBLE Left 1/6/2021    Procedure: OPEN REDUCTION, FRACTURE, MANDIBLE;  Surgeon: Dar Mcgill MD;  Location: [facility] OR [address] [place] [address];  Service: ENT;  Laterality: Left;    REDUCTION OF BOTH BREASTS Bilateral 1990     No family history on file.  Social History     Tobacco Use    Smoking status: Never Smoker    Smokeless tobacco: Never Used   Substance Use Topics    Alcohol use: Never    Drug use: Never     Review of Systems   Constitutional: Negative for chills and fever.   Eyes: Negative for visual disturbance.   Respiratory: Negative for cough and shortness of breath.    Cardiovascular: Negative for chest pain and leg swelling.   Gastrointestinal: Negative for abdominal pain, nausea and vomiting.   Genitourinary: Negative for dysuria and frequency.    Musculoskeletal: Positive for gait problem. Negative for back pain and neck pain.   Neurological: Negative for dizziness, seizures, syncope, light-headedness and numbness.   Hematological: Does not bruise/bleed easily.   All other systems reviewed and are negative.      Physical Exam     Initial Vitals [07/06/22 2008]   BP Pulse Resp Temp SpO2   137/81 87 16 99.4 °F (37.4 °C) 98 %      MAP       --         Physical Exam  General: AO x 3, NAD. Well nourished. Well Developed  Head: Normocephalic and Atraumatic  HEENT: PERRLA. EOMI. OP Clear  Neck: Supple, Nontender in midline.  Cardiovascular: RRR. No m/r/g. 2+ Distal Pulses  Pulm/Chest: Chest nontender. Lungs clear to auscultation. No respiratory distress.  Abdomen: Nontender. Nondistended. No rigidity, rebound, or guarding  MSK: TTP to L upper arm, L thigh, L knee  Ext: no clubbing, cyanosis, or edema  Neuro: GCS 15. CN II-XII intact. Intact symmetric sensation x 4 extremities  Skin: ecchymosis to L knee. Otherwise, Warm, dry, and intact.  Psych: normal mood and affect.    ED Course   Procedures  Labs Reviewed   CBC W/ AUTO DIFFERENTIAL - Abnormal; Notable for the following components:       Result Value    RBC 3.16 (*)     Hemoglobin 9.6 (*)     Hematocrit 29.3 (*)     All other components within normal limits   URINALYSIS, REFLEX TO URINE CULTURE - Abnormal; Notable for the following components:    Ketones, UA Trace (*)     All other components within normal limits    Narrative:     Specimen Source->Urine   COMPREHENSIVE METABOLIC PANEL - Abnormal; Notable for the following components:    Sodium 132 (*)     Albumin 3.1 (*)     All other components within normal limits   MAGNESIUM   PROTIME-INR   TROPONIN I   ISTAT CHEM8   POCT CREATININE          Imaging Results          X-Ray Femur AP/LAT Left (Final result)  Result time 07/06/22 23:31:36    Final result by John Abdi MD (07/06/22 23:31:36)                 Impression:      Negative left femur for  fracture or loosening.      Electronically signed by: John Abdi  Date:    07/06/2022  Time:    23:31             Narrative:    EXAMINATION:  XR FEMUR 2 VIEW LEFT    CLINICAL HISTORY:  Injury, unspecified, initial encounter    TECHNIQUE:  AP and lateral views of the left femur were performed.    COMPARISON:  None}    FINDINGS:  Total knee arthroplasty changes are noted.  Otherwise the femur appears intact.                               X-Ray Hip 2 or 3 views Right (with Pelvis when performed) (Final result)  Result time 07/06/22 23:33:08    Final result by John Abdi MD (07/06/22 23:33:08)                 Impression:      Remote partially healed fractures of the inferior and superior pubic ramus on the right.    No evidence of right hip fracture.      Electronically signed by: John Abdi  Date:    07/06/2022  Time:    23:33             Narrative:    EXAMINATION:  XR HIP WITH PELVIS WHEN PERFORMED, 2 OR 3  VIEWS RIGHT    CLINICAL HISTORY:  Injury, unspecified, initial encounter    TECHNIQUE:  AP view of the pelvis and frog leg lateral view of the right hip were performed.    COMPARISON:  None    FINDINGS:  Pelvic ring appears intact.  A remote partially healed fracture of the inferior pubic ramus is suggested on the right.  Irregularity of the superior pubic ramus may represent incomplete healed fracture.  The right femur and acetabulum appear intact                                X-Ray Humerus 2 View Left (Final result)  Result time 07/06/22 23:23:14    Final result by John Abdi MD (07/06/22 23:23:14)                 Impression:      Negative left humerus.    At least 2 minimally displaced left lower lateral rib fractures with no evidence of pneumothorax.  Consider further evaluation with left rib series.    This report was flagged in Epic as abnormal.      Electronically signed by: John Abdi  Date:    07/06/2022  Time:    23:23             Narrative:    EXAMINATION:  XR HUMERUS 2  VIEW LEFT    CLINICAL HISTORY:  Injury, unspecified, initial encounter    TECHNIQUE:  PA and lateral views of the left humerus    COMPARISON:  None    FINDINGS:  Bones, joint spaces and soft tissues appear intact.  IV tubing projects over the distal arm.    Acute minimally displaced fractures of lower anterior ribs are noted difficult to count due to incomplete visualization of the chest.                               X-Ray Knee 1 or 2 View Left (Final result)  Result time 07/06/22 22:20:40    Final result by John Abdi MD (07/06/22 22:20:40)                 Impression:      Total left knee arthroplasty with no acute finding.      Electronically signed by: John Abdi  Date:    07/06/2022  Time:    22:20             Narrative:    EXAMINATION:  XR KNEE 1 OR 2 VIEW LEFT    CLINICAL HISTORY:  trauma;    TECHNIQUE:  Two views of the left knee    COMPARISON:  None    FINDINGS:  Total left knee arthroplasty changes are noted without evidence of fracture loosening or soft tissue swelling.  No effusion is evident.                                 Medications - No data to display  Medical Decision Making:   ED Management:  Unclear etiology fall but Appears mechanical.No Preceding symptoms. CT brain and C spine ordered and pending at time of turnover. Plain films pending at time of turnover. Lab assays to assess for possible medical cause of fall.     Care transitioned to Dr. Santamaria @ 10pm.             ED Course as of 07/06/22 2345 Wed Jul 06, 2022 2119 83 yo F s/p fall from walker w/ seat. Witnessed by daughter. No LOC. Progressive B hip pain, L arm pain, LLE pain. Will obtain CT brain, Cspine. Will obtain extremity xrays.  [DS]   2147 EKG W/ NSR. HR 81 bpm. Normal intervals. No significant ST changes [DS]   2148 INR unremarkable [DS]      ED Course User Index  [DS] Luis Vergara MD             Clinical Impression:   Final diagnoses:  [W19.XXXA] Fall  [T14.90XA] Trauma  [T14.90XA] Trauma  [T14.90XA]  Trauma                 Luis Vergara MD  07/06/22 [id number]

## 2022-07-22 NOTE — PROVIDER PROGRESS NOTES - EMERGENCY DEPT.
Patient was signed out to me by Dr. Vergara pending CT head/C-spine and CXR imaging studies. Briefly, this is a 81yo F presenting after mechanical fall.    PE:   Const: Awake and alert, NAD  Resp: Even and unlabored  Neuro: Moves all extremities equally  Psych: Normal mood and affect    Data:    Imaging Results          X-Ray Chest AP Portable (Final result)  Result time 07/07/22 02:14:09    Final result by John Abdi MD (07/07/22 02:14:09)                 Impression:      Is present is no evidence of acute chest disease.      Electronically signed by: John Abdi  Date:    07/07/2022  Time:    02:14             Narrative:    EXAMINATION:  XR CHEST AP PORTABLE    CLINICAL HISTORY:  Multiple fractures of ribs, unspecified side, initial encounter for closed fracture    TECHNIQUE:  Single frontal view of the chest was performed.    COMPARISON:  12/30/2020    FINDINGS:  The heart is not enlarged: Trachea is midline.  There is a 2.5 cm nodular opacities suggested in the left lung apex thought to represent the anterior margin of the left 1st rib.  No infiltrate or effusion is evident.  There are stable atherosclerotic changes in the aortic arch.                               CT Head Without Contrast (Final result)  Result time 07/07/22 00:31:48    Final result by John Abdi MD (07/07/22 00:31:48)                 Impression:      1. No evidence of acute intracranial pathology.  2. No acute displaced fracture or traumatic dislocation.  3. Generalized cerebral volume loss with chronic microvascular ischemic change.  4. Degenerative changes of the cervical spine as above.  5.  Additional findings as above.    Electronically signed by resident: Osmani Aguirre  Date:    07/06/2022  Time:    23:59    Electronically signed by: John Abdi  Date:    07/07/2022  Time:    00:31             Narrative:    EXAMINATION:  CT HEAD WITHOUT CONTRAST; CT CERVICAL SPINE WITHOUT CONTRAST    CLINICAL HISTORY:  Head  trauma, minor (Age >= 65y);; Neck trauma (Age >= 65y);    TECHNIQUE:  Low dose axial CT images obtained throughout the head and cervical spine without the use of intravenous contrast.  Axial, sagittal and coronal reconstructions were performed.    COMPARISON:  CT head 05/29/2022.  CT head, C-spine 12/30/2020    FINDINGS:  CT HEAD    Prominence of the ventricles and sulci compatible with generalized cerebral volume loss.  No hydrocephalus.    Patchy and common hypoattenuation in the supratentorial white matter, nonspecific but most likely reflecting chronic microvascular ischemic changes.  Stable calcifications of the bilateral basal ganglia.  No recent or remote major vascular distribution infarct. No acute hemorrhage.  No mass effect or midline shift.    No extra-axial blood or fluid collections.    No fracture.  Mucosal thickening of the right maxillary antrum with hyperostosis, decreased compared to prior.  Mastoid air cells and remaining paranasal sinuses are essentially clear.  Bilateral calcific scleral plaques.  Internal fixation of healed left jose mandibular fracture with osteoarthritis in the mandibular condyles.    CERVICAL SPINE    Increased cervical lordosis.  Mild cervical levocurvature.  Minimal retrolisthesis of C5 on C6.  Minimal anterolisthesis of C7 on T1.  The vertebral body heights appear well-maintained.  There is no evidence of fracture or osseous lytic or blastic process.  Multilevel degenerative disc disease.  Focal degenerative changes are described below.    C2 -- C3: Posterior disc-osteophyte complex.  Uncovertebral spurring.  No significant neural foraminal narrowing. There is no central canal stenosis.    C3 -- C4: Posterior disc-osteophyte complex.  Bilateral facet arthropathy with uncovertebral spurring.  Mild-moderate right neural foraminal narrowing.  Mild central canal stenosis.    C4 -- C5: Posterior disc-osteophyte complex.  Bilateral facet arthropathy with uncovertebral  spurring.  Mild right neural foraminal narrowing. There is no central canal stenosis.    C5 -- C6: Posterior disc-osteophyte complex.  Bilateral facet arthropathy with uncovertebral spurring.  Mild bilateral neural foraminal narrowing. There is no central canal stenosis.    C6 -- C7: Posterior disc-osteophyte complex.  Bilateral facet arthropathy.  Mild left neural foraminal narrowing. There is no central canal stenosis.    C7 -- T1: Posterior disc-osteophyte complex.  Right facet arthropathy.  No significant neural foraminal narrowing. There is no central canal stenosis.    Evaluation of the surrounding soft tissues demonstrate trace biapical pleuroparenchymal scarring.  Calcific atherosclerosis at the bilateral carotid bifurcations.  The airways appear patent.                               CT Cervical Spine Without Contrast (Final result)  Result time 07/07/22 00:31:48    Final result by John Abdi MD (07/07/22 00:31:48)                 Impression:      1. No evidence of acute intracranial pathology.  2. No acute displaced fracture or traumatic dislocation.  3. Generalized cerebral volume loss with chronic microvascular ischemic change.  4. Degenerative changes of the cervical spine as above.  5.  Additional findings as above.    Electronically signed by resident: Osmani Aguirre  Date:    07/06/2022  Time:    23:59    Electronically signed by: John Abdi  Date:    07/07/2022  Time:    00:31             Narrative:    EXAMINATION:  CT HEAD WITHOUT CONTRAST; CT CERVICAL SPINE WITHOUT CONTRAST    CLINICAL HISTORY:  Head trauma, minor (Age >= 65y);; Neck trauma (Age >= 65y);    TECHNIQUE:  Low dose axial CT images obtained throughout the head and cervical spine without the use of intravenous contrast.  Axial, sagittal and coronal reconstructions were performed.    COMPARISON:  CT head 05/29/2022.  CT head, C-spine 12/30/2020    FINDINGS:  CT HEAD    Prominence of the ventricles and sulci compatible with  generalized cerebral volume loss.  No hydrocephalus.    Patchy and common hypoattenuation in the supratentorial white matter, nonspecific but most likely reflecting chronic microvascular ischemic changes.  Stable calcifications of the bilateral basal ganglia.  No recent or remote major vascular distribution infarct. No acute hemorrhage.  No mass effect or midline shift.    No extra-axial blood or fluid collections.    No fracture.  Mucosal thickening of the right maxillary antrum with hyperostosis, decreased compared to prior.  Mastoid air cells and remaining paranasal sinuses are essentially clear.  Bilateral calcific scleral plaques.  Internal fixation of healed left jose mandibular fracture with osteoarthritis in the mandibular condyles.    CERVICAL SPINE    Increased cervical lordosis.  Mild cervical levocurvature.  Minimal retrolisthesis of C5 on C6.  Minimal anterolisthesis of C7 on T1.  The vertebral body heights appear well-maintained.  There is no evidence of fracture or osseous lytic or blastic process.  Multilevel degenerative disc disease.  Focal degenerative changes are described below.    C2 -- C3: Posterior disc-osteophyte complex.  Uncovertebral spurring.  No significant neural foraminal narrowing. There is no central canal stenosis.    C3 -- C4: Posterior disc-osteophyte complex.  Bilateral facet arthropathy with uncovertebral spurring.  Mild-moderate right neural foraminal narrowing.  Mild central canal stenosis.    C4 -- C5: Posterior disc-osteophyte complex.  Bilateral facet arthropathy with uncovertebral spurring.  Mild right neural foraminal narrowing. There is no central canal stenosis.    C5 -- C6: Posterior disc-osteophyte complex.  Bilateral facet arthropathy with uncovertebral spurring.  Mild bilateral neural foraminal narrowing. There is no central canal stenosis.    C6 -- C7: Posterior disc-osteophyte complex.  Bilateral facet arthropathy.  Mild left neural foraminal narrowing. There is  no central canal stenosis.    C7 -- T1: Posterior disc-osteophyte complex.  Right facet arthropathy.  No significant neural foraminal narrowing. There is no central canal stenosis.    Evaluation of the surrounding soft tissues demonstrate trace biapical pleuroparenchymal scarring.  Calcific atherosclerosis at the bilateral carotid bifurcations.  The airways appear patent.                               X-Ray Femur AP/LAT Left (Final result)  Result time 07/06/22 23:31:36    Final result by John Abdi MD (07/06/22 23:31:36)                 Impression:      Negative left femur for fracture or loosening.      Electronically signed by: John Abdi  Date:    07/06/2022  Time:    23:31             Narrative:    EXAMINATION:  XR FEMUR 2 VIEW LEFT    CLINICAL HISTORY:  Injury, unspecified, initial encounter    TECHNIQUE:  AP and lateral views of the left femur were performed.    COMPARISON:  None}    FINDINGS:  Total knee arthroplasty changes are noted.  Otherwise the femur appears intact.                               X-Ray Hip 2 or 3 views Right (with Pelvis when performed) (Final result)  Result time 07/06/22 23:33:08    Final result by John Abdi MD (07/06/22 23:33:08)                 Impression:      Remote partially healed fractures of the inferior and superior pubic ramus on the right.    No evidence of right hip fracture.      Electronically signed by: John Abdi  Date:    07/06/2022  Time:    23:33             Narrative:    EXAMINATION:  XR HIP WITH PELVIS WHEN PERFORMED, 2 OR 3  VIEWS RIGHT    CLINICAL HISTORY:  Injury, unspecified, initial encounter    TECHNIQUE:  AP view of the pelvis and frog leg lateral view of the right hip were performed.    COMPARISON:  None    FINDINGS:  Pelvic ring appears intact.  A remote partially healed fracture of the inferior pubic ramus is suggested on the right.  Irregularity of the superior pubic ramus may represent incomplete healed fracture.  The right  femur and acetabulum appear intact                                X-Ray Humerus 2 View Left (Final result)  Result time 07/06/22 23:23:14    Final result by John Abdi MD (07/06/22 23:23:14)                 Impression:      Negative left humerus.    At least 2 minimally displaced left lower lateral rib fractures with no evidence of pneumothorax.  Consider further evaluation with left rib series.    This report was flagged in Epic as abnormal.      Electronically signed by: John Abdi  Date:    07/06/2022  Time:    23:23             Narrative:    EXAMINATION:  XR HUMERUS 2 VIEW LEFT    CLINICAL HISTORY:  Injury, unspecified, initial encounter    TECHNIQUE:  PA and lateral views of the left humerus    COMPARISON:  None    FINDINGS:  Bones, joint spaces and soft tissues appear intact.  IV tubing projects over the distal arm.    Acute minimally displaced fractures of lower anterior ribs are noted difficult to count due to incomplete visualization of the chest.                               X-Ray Knee 1 or 2 View Left (Final result)  Result time 07/06/22 22:20:40    Final result by John Abdi MD (07/06/22 22:20:40)                 Impression:      Total left knee arthroplasty with no acute finding.      Electronically signed by: John Abdi  Date:    07/06/2022  Time:    22:20             Narrative:    EXAMINATION:  XR KNEE 1 OR 2 VIEW LEFT    CLINICAL HISTORY:  trauma;    TECHNIQUE:  Two views of the left knee    COMPARISON:  None    FINDINGS:  Total left knee arthroplasty changes are noted without evidence of fracture loosening or soft tissue swelling.  No effusion is evident.                                 MDM:   CXR negative for ptx. CT negative for acute fracture/dislocation or ICH. Patient stable for DC home.

## 2022-07-27 ENCOUNTER — LAB VISIT (OUTPATIENT)
Dept: LAB | Facility: HOSPITAL | Age: 82
End: 2022-07-27
Attending: PSYCHIATRY & NEUROLOGY
Payer: MEDICARE

## 2022-07-27 DIAGNOSIS — G30.1 LATE ONSET ALZHEIMER'S DISEASE WITH BEHAVIORAL DISTURBANCE: ICD-10-CM

## 2022-07-27 DIAGNOSIS — F02.818 LATE ONSET ALZHEIMER'S DISEASE WITH BEHAVIORAL DISTURBANCE: ICD-10-CM

## 2022-07-27 LAB
ALBUMIN SERPL BCP-MCNC: 3.5 G/DL (ref 3.5–5.2)
ALP SERPL-CCNC: 95 U/L (ref 55–135)
ALT SERPL W/O P-5'-P-CCNC: 10 U/L (ref 10–44)
ANION GAP SERPL CALC-SCNC: 10 MMOL/L (ref 8–16)
AST SERPL-CCNC: 17 U/L (ref 10–40)
BILIRUB SERPL-MCNC: 0.4 MG/DL (ref 0.1–1)
BUN SERPL-MCNC: 15 MG/DL (ref 8–23)
CALCIUM SERPL-MCNC: 9.7 MG/DL (ref 8.7–10.5)
CHLORIDE SERPL-SCNC: 99 MMOL/L (ref 95–110)
CO2 SERPL-SCNC: 26 MMOL/L (ref 23–29)
CREAT SERPL-MCNC: 1 MG/DL (ref 0.5–1.4)
CRP SERPL-MCNC: 5.7 MG/L (ref 0–8.2)
ERYTHROCYTE [SEDIMENTATION RATE] IN BLOOD BY WESTERGREN METHOD: 84 MM/HR (ref 0–20)
EST. GFR  (AFRICAN AMERICAN): >60 ML/MIN/1.73 M^2
EST. GFR  (NON AFRICAN AMERICAN): 53 ML/MIN/1.73 M^2
FIBRINOGEN PPP-MCNC: 499 MG/DL (ref 182–400)
FOLATE SERPL-MCNC: 14.5 NG/ML (ref 4–24)
GLUCOSE SERPL-MCNC: 121 MG/DL (ref 70–110)
HCYS SERPL-SCNC: 14.5 UMOL/L (ref 4–15.5)
POTASSIUM SERPL-SCNC: 4.3 MMOL/L (ref 3.5–5.1)
PROT SERPL-MCNC: 7.4 G/DL (ref 6–8.4)
SODIUM SERPL-SCNC: 135 MMOL/L (ref 136–145)
TSH SERPL DL<=0.005 MIU/L-ACNC: 1.3 UIU/ML (ref 0.4–4)
VIT B12 SERPL-MCNC: 588 PG/ML (ref 210–950)

## 2022-07-27 PROCEDURE — 82607 VITAMIN B-12: CPT | Performed by: PSYCHIATRY & NEUROLOGY

## 2022-07-27 PROCEDURE — 86140 C-REACTIVE PROTEIN: CPT | Performed by: PSYCHIATRY & NEUROLOGY

## 2022-07-27 PROCEDURE — 83090 ASSAY OF HOMOCYSTEINE: CPT | Performed by: PSYCHIATRY & NEUROLOGY

## 2022-07-27 PROCEDURE — 36415 COLL VENOUS BLD VENIPUNCTURE: CPT | Performed by: PSYCHIATRY & NEUROLOGY

## 2022-07-27 PROCEDURE — 82746 ASSAY OF FOLIC ACID SERUM: CPT | Performed by: PSYCHIATRY & NEUROLOGY

## 2022-07-27 PROCEDURE — 85025 COMPLETE CBC W/AUTO DIFF WBC: CPT | Performed by: PSYCHIATRY & NEUROLOGY

## 2022-07-27 PROCEDURE — 86592 SYPHILIS TEST NON-TREP QUAL: CPT | Performed by: PSYCHIATRY & NEUROLOGY

## 2022-07-27 PROCEDURE — 85384 FIBRINOGEN ACTIVITY: CPT | Performed by: PSYCHIATRY & NEUROLOGY

## 2022-07-27 PROCEDURE — 84443 ASSAY THYROID STIM HORMONE: CPT | Performed by: PSYCHIATRY & NEUROLOGY

## 2022-07-27 PROCEDURE — 80053 COMPREHEN METABOLIC PANEL: CPT | Performed by: PSYCHIATRY & NEUROLOGY

## 2022-07-27 PROCEDURE — 85651 RBC SED RATE NONAUTOMATED: CPT | Performed by: PSYCHIATRY & NEUROLOGY

## 2022-07-28 LAB
BASOPHILS # BLD AUTO: 0.05 K/UL (ref 0–0.2)
BASOPHILS NFR BLD: 1.1 % (ref 0–1.9)
DIFFERENTIAL METHOD: ABNORMAL
EOSINOPHIL # BLD AUTO: 0.1 K/UL (ref 0–0.5)
EOSINOPHIL NFR BLD: 1.5 % (ref 0–8)
ERYTHROCYTE [DISTWIDTH] IN BLOOD BY AUTOMATED COUNT: 13 % (ref 11.5–14.5)
HCT VFR BLD AUTO: 29.6 % (ref 37–48.5)
HGB BLD-MCNC: 9.3 G/DL (ref 12–16)
IMM GRANULOCYTES # BLD AUTO: 0 K/UL (ref 0–0.04)
IMM GRANULOCYTES NFR BLD AUTO: 0 % (ref 0–0.5)
LYMPHOCYTES # BLD AUTO: 1.7 K/UL (ref 1–4.8)
LYMPHOCYTES NFR BLD: 35.9 % (ref 18–48)
MCH RBC QN AUTO: 28.9 PG (ref 27–31)
MCHC RBC AUTO-ENTMCNC: 31.4 G/DL (ref 32–36)
MCV RBC AUTO: 92 FL (ref 82–98)
MONOCYTES # BLD AUTO: 0.3 K/UL (ref 0.3–1)
MONOCYTES NFR BLD: 5.7 % (ref 4–15)
NEUTROPHILS # BLD AUTO: 2.7 K/UL (ref 1.8–7.7)
NEUTROPHILS NFR BLD: 55.8 % (ref 38–73)
NRBC BLD-RTO: 0 /100 WBC
PLATELET # BLD AUTO: 311 K/UL (ref 150–450)
PMV BLD AUTO: 12 FL (ref 9.2–12.9)
RBC # BLD AUTO: 3.22 M/UL (ref 4–5.4)
RPR SER QL: NORMAL
WBC # BLD AUTO: 4.74 K/UL (ref 3.9–12.7)

## 2022-08-05 PROBLEM — N39.0 ACUTE URINARY TRACT INFECTION: Status: ACTIVE | Noted: 2022-08-05

## 2022-08-22 ENCOUNTER — LAB VISIT (OUTPATIENT)
Dept: LAB | Facility: HOSPITAL | Age: 82
End: 2022-08-22
Attending: PSYCHIATRY & NEUROLOGY
Payer: MEDICARE

## 2022-08-22 DIAGNOSIS — R56.9 SEIZURE: ICD-10-CM

## 2022-08-22 PROCEDURE — 36415 COLL VENOUS BLD VENIPUNCTURE: CPT | Performed by: PSYCHIATRY & NEUROLOGY

## 2022-08-22 PROCEDURE — 80177 DRUG SCRN QUAN LEVETIRACETAM: CPT | Performed by: PSYCHIATRY & NEUROLOGY

## 2022-08-25 LAB — LEVETIRACETAM SERPL-MCNC: 10.5 UG/ML (ref 3–60)

## 2022-08-26 PROBLEM — G40.909 SEIZURE DISORDER: Status: ACTIVE | Noted: 2022-08-26

## 2022-09-07 ENCOUNTER — LAB VISIT (OUTPATIENT)
Dept: LAB | Facility: HOSPITAL | Age: 82
End: 2022-09-07
Attending: PSYCHIATRY & NEUROLOGY
Payer: MEDICARE

## 2022-09-07 DIAGNOSIS — N39.0 ACUTE URINARY TRACT INFECTION: ICD-10-CM

## 2022-09-07 PROCEDURE — 81001 URINALYSIS AUTO W/SCOPE: CPT | Performed by: PSYCHIATRY & NEUROLOGY

## 2022-09-08 LAB
BACTERIA #/AREA URNS AUTO: ABNORMAL /HPF
BILIRUB UR QL STRIP: NEGATIVE
CLARITY UR REFRACT.AUTO: CLEAR
COLOR UR AUTO: YELLOW
GLUCOSE UR QL STRIP: NEGATIVE
HGB UR QL STRIP: NEGATIVE
HYALINE CASTS UR QL AUTO: 3 /LPF
KETONES UR QL STRIP: NEGATIVE
LEUKOCYTE ESTERASE UR QL STRIP: ABNORMAL
MICROSCOPIC COMMENT: ABNORMAL
NITRITE UR QL STRIP: NEGATIVE
PH UR STRIP: 7 [PH] (ref 5–8)
PROT UR QL STRIP: NEGATIVE
RBC #/AREA URNS AUTO: 1 /HPF (ref 0–4)
SP GR UR STRIP: 1.01 (ref 1–1.03)
SQUAMOUS #/AREA URNS AUTO: 2 /HPF
URN SPEC COLLECT METH UR: ABNORMAL
WBC #/AREA URNS AUTO: 11 /HPF (ref 0–5)

## 2022-10-26 ENCOUNTER — OFFICE VISIT (OUTPATIENT)
Dept: UROLOGY | Facility: CLINIC | Age: 82
End: 2022-10-26
Payer: MEDICARE

## 2022-10-26 VITALS
SYSTOLIC BLOOD PRESSURE: 127 MMHG | DIASTOLIC BLOOD PRESSURE: 64 MMHG | HEART RATE: 77 BPM | BODY MASS INDEX: 21.63 KG/M2 | WEIGHT: 130 LBS

## 2022-10-26 DIAGNOSIS — N30.00 ACUTE CYSTITIS WITHOUT HEMATURIA: Primary | ICD-10-CM

## 2022-10-26 PROBLEM — R53.81 DEBILITY: Status: RESOLVED | Noted: 2021-01-13 | Resolved: 2022-10-26

## 2022-10-26 PROBLEM — R73.03 PREDIABETES: Status: RESOLVED | Noted: 2021-01-13 | Resolved: 2022-10-26

## 2022-10-26 PROBLEM — Z79.899 DRUG THERAPY: Status: RESOLVED | Noted: 2020-07-09 | Resolved: 2022-10-26

## 2022-10-26 PROCEDURE — 1101F PR PT FALLS ASSESS DOC 0-1 FALLS W/OUT INJ PAST YR: ICD-10-PCS | Mod: CPTII,S$GLB,, | Performed by: NURSE PRACTITIONER

## 2022-10-26 PROCEDURE — 87077 CULTURE AEROBIC IDENTIFY: CPT | Performed by: NURSE PRACTITIONER

## 2022-10-26 PROCEDURE — 3078F PR MOST RECENT DIASTOLIC BLOOD PRESSURE < 80 MM HG: ICD-10-PCS | Mod: CPTII,S$GLB,, | Performed by: NURSE PRACTITIONER

## 2022-10-26 PROCEDURE — 99999 PR PBB SHADOW E&M-EST. PATIENT-LVL III: ICD-10-PCS | Mod: PBBFAC,,, | Performed by: NURSE PRACTITIONER

## 2022-10-26 PROCEDURE — 1126F PR PAIN SEVERITY QUANTIFIED, NO PAIN PRESENT: ICD-10-PCS | Mod: CPTII,S$GLB,, | Performed by: NURSE PRACTITIONER

## 2022-10-26 PROCEDURE — 99204 PR OFFICE/OUTPT VISIT, NEW, LEVL IV, 45-59 MIN: ICD-10-PCS | Mod: S$GLB,,, | Performed by: NURSE PRACTITIONER

## 2022-10-26 PROCEDURE — 1126F AMNT PAIN NOTED NONE PRSNT: CPT | Mod: CPTII,S$GLB,, | Performed by: NURSE PRACTITIONER

## 2022-10-26 PROCEDURE — 87086 URINE CULTURE/COLONY COUNT: CPT | Performed by: NURSE PRACTITIONER

## 2022-10-26 PROCEDURE — 3078F DIAST BP <80 MM HG: CPT | Mod: CPTII,S$GLB,, | Performed by: NURSE PRACTITIONER

## 2022-10-26 PROCEDURE — 3288F PR FALLS RISK ASSESSMENT DOCUMENTED: ICD-10-PCS | Mod: CPTII,S$GLB,, | Performed by: NURSE PRACTITIONER

## 2022-10-26 PROCEDURE — 1159F PR MEDICATION LIST DOCUMENTED IN MEDICAL RECORD: ICD-10-PCS | Mod: CPTII,S$GLB,, | Performed by: NURSE PRACTITIONER

## 2022-10-26 PROCEDURE — 1159F MED LIST DOCD IN RCRD: CPT | Mod: CPTII,S$GLB,, | Performed by: NURSE PRACTITIONER

## 2022-10-26 PROCEDURE — 87088 URINE BACTERIA CULTURE: CPT | Performed by: NURSE PRACTITIONER

## 2022-10-26 PROCEDURE — 3074F PR MOST RECENT SYSTOLIC BLOOD PRESSURE < 130 MM HG: ICD-10-PCS | Mod: CPTII,S$GLB,, | Performed by: NURSE PRACTITIONER

## 2022-10-26 PROCEDURE — 87186 SC STD MICRODIL/AGAR DIL: CPT | Performed by: NURSE PRACTITIONER

## 2022-10-26 PROCEDURE — 1101F PT FALLS ASSESS-DOCD LE1/YR: CPT | Mod: CPTII,S$GLB,, | Performed by: NURSE PRACTITIONER

## 2022-10-26 PROCEDURE — 3288F FALL RISK ASSESSMENT DOCD: CPT | Mod: CPTII,S$GLB,, | Performed by: NURSE PRACTITIONER

## 2022-10-26 PROCEDURE — 3074F SYST BP LT 130 MM HG: CPT | Mod: CPTII,S$GLB,, | Performed by: NURSE PRACTITIONER

## 2022-10-26 PROCEDURE — 99204 OFFICE O/P NEW MOD 45 MIN: CPT | Mod: S$GLB,,, | Performed by: NURSE PRACTITIONER

## 2022-10-26 PROCEDURE — 99999 PR PBB SHADOW E&M-EST. PATIENT-LVL III: CPT | Mod: PBBFAC,,, | Performed by: NURSE PRACTITIONER

## 2022-10-26 NOTE — PROGRESS NOTES
"Chief Complaint:   Urinary tract infection    HPI:   Patient is an 82-year-old female that is presenting with her daughter.  Daughter states that patient was seen by Nephrology and treated for urinary tract infection.Reassessment of urine culture indicated "mucous".  Patient is presenting secondary to mucus seen in clean-catch urine sample.  Patient is asymptomatic.  No abd/pelvic pain and no exac/rel factors.  No hematuria.  No urolithiasis.  No urinary bother.  No  history.      Allergies:  Patient has no known allergies.    Medications:  has a current medication list which includes the following prescription(s): docusate sodium, levetiracetam, multivitamin, and venlafaxine.    Review of Systems:  General: No fever, chills, fatigability, or weight loss.  Skin: No rashes, itching, or changes in color or texture of skin.  Chest: Denies MILLS, cyanosis, wheezing, cough, and sputum production.  Abdomen: Appetite fine. No weight loss. Denies diarrhea, abdominal pain, hematemesis, or blood in stool.  Musculoskeletal: No joint stiffness or swelling. Denies back pain.  : As above.  All other review of systems negative.    PMH:   has a past medical history of History of hypertension, diabetes mellitus, Hyperlipidemia, Memory loss, and Osteoarthritis.    PSH:   has a past surgical history that includes Knee surgery (Left, 2018); Knee surgery (Right, 2000); Reduction of both breasts (Bilateral, 1990); Cholecystectomy; Hysterectomy; and Open reduction of fracture of mandible (Left, 1/6/2021).    FamHx: family history is not on file.    SocHx:  reports that she has never smoked. She has never used smokeless tobacco. She reports that she does not drink alcohol and does not use drugs.      Physical Exam:  Vitals:    10/26/22 1312   BP: 127/64   Pulse: 77     General: A&Ox3, no apparent distress, no deformities  Neck: No masses, normal thyroid  Lungs: normal inspiration, no use of accessory muscles  Heart: normal pulse, no " arrhythmias  Abdomen: Soft, NT, ND, no masses, no hernias, no hepatosplenomegaly  Lymphatic: Neck and groin nodes negative    Impression/Plan:   Urinary tract infection  Patient and daughter were reassured.  Catheter urine sample was sent for urinalysis and culture.  We will contact daughter with results and needed follow-up

## 2022-10-26 NOTE — PROGRESS NOTES
Per NP Lacey Rushigurdeep cath UA/CS specimen collected via sterile technique . Pt tolerated well will notify with results.    Loraine Milanes RN

## 2022-10-27 RX ORDER — CEFDINIR 300 MG/1
300 CAPSULE ORAL 2 TIMES DAILY
Qty: 20 CAPSULE | Refills: 0 | Status: SHIPPED | OUTPATIENT
Start: 2022-10-27 | End: 2022-11-06

## 2022-10-29 LAB — BACTERIA UR CULT: ABNORMAL

## 2022-11-01 ENCOUNTER — TELEPHONE (OUTPATIENT)
Dept: UROLOGY | Facility: CLINIC | Age: 82
End: 2022-11-01
Payer: MEDICARE

## 2022-11-01 DIAGNOSIS — N30.00 ACUTE CYSTITIS WITHOUT HEMATURIA: Primary | ICD-10-CM

## 2022-11-01 NOTE — TELEPHONE ENCOUNTER
Left msg on pts daughter's cell informing her that mom's urine indicated e coli and that pt should complete taking the Omnicef; also pt should go to the lab in 2 weeks for repeat.  Appt scheduled and mailed.

## 2022-11-08 ENCOUNTER — TELEPHONE (OUTPATIENT)
Dept: UROLOGY | Facility: CLINIC | Age: 82
End: 2022-11-08
Payer: MEDICARE

## 2022-11-08 NOTE — TELEPHONE ENCOUNTER
Reached out to pt's relative to inform her of pt's results.She suggested I call pt's daughter and inform her. Called pt's daughter multiple times but no answer, was unable to leave voicemail.

## 2022-11-15 ENCOUNTER — TELEPHONE (OUTPATIENT)
Dept: UROLOGY | Facility: CLINIC | Age: 82
End: 2022-11-15
Payer: MEDICARE

## 2022-11-15 ENCOUNTER — CLINICAL SUPPORT (OUTPATIENT)
Dept: UROLOGY | Facility: CLINIC | Age: 82
End: 2022-11-15
Payer: MEDICARE

## 2022-11-15 DIAGNOSIS — N30.00 ACUTE CYSTITIS WITHOUT HEMATURIA: Primary | ICD-10-CM

## 2022-11-15 PROCEDURE — 81001 URINALYSIS AUTO W/SCOPE: CPT | Performed by: NURSE PRACTITIONER

## 2022-11-15 NOTE — TELEPHONE ENCOUNTER
Received call from the lab; pt there to give urine sample but unable to go.  Daughter requesting to have mom cathed.  Spoke to provider and was given order to cath.

## 2022-11-15 NOTE — PROGRESS NOTES
Patient came in via wheelchair accompanied by daughter. Patient unable to give UA. Performed cath UA using aseptic technique. Daughter at bedside, patient tolerated procedure well.

## 2022-11-16 LAB
BILIRUB UR QL STRIP: NEGATIVE
CLARITY UR REFRACT.AUTO: CLEAR
COLOR UR AUTO: YELLOW
GLUCOSE UR QL STRIP: NEGATIVE
HGB UR QL STRIP: NEGATIVE
HYALINE CASTS UR QL AUTO: 1 /LPF
KETONES UR QL STRIP: NEGATIVE
LEUKOCYTE ESTERASE UR QL STRIP: NEGATIVE
MICROSCOPIC COMMENT: NORMAL
NITRITE UR QL STRIP: NEGATIVE
PH UR STRIP: 6 [PH] (ref 5–8)
PROT UR QL STRIP: NEGATIVE
RBC #/AREA URNS AUTO: 1 /HPF (ref 0–4)
SP GR UR STRIP: 1.01 (ref 1–1.03)
SQUAMOUS #/AREA URNS AUTO: 0 /HPF
URN SPEC COLLECT METH UR: NORMAL
WBC #/AREA URNS AUTO: 1 /HPF (ref 0–5)

## 2022-11-29 ENCOUNTER — TELEPHONE (OUTPATIENT)
Dept: UROLOGY | Facility: CLINIC | Age: 82
End: 2022-11-29
Payer: MEDICARE

## 2023-02-24 ENCOUNTER — LAB VISIT (OUTPATIENT)
Dept: LAB | Facility: HOSPITAL | Age: 83
End: 2023-02-24
Attending: PSYCHIATRY & NEUROLOGY
Payer: MEDICARE

## 2023-02-24 DIAGNOSIS — F03.90 DEMENTIA WITHOUT BEHAVIORAL DISTURBANCE: ICD-10-CM

## 2023-02-24 DIAGNOSIS — N39.0 ACUTE URINARY TRACT INFECTION: ICD-10-CM

## 2023-02-24 DIAGNOSIS — F02.818 LATE ONSET ALZHEIMER'S DISEASE WITH BEHAVIORAL DISTURBANCE: ICD-10-CM

## 2023-02-24 DIAGNOSIS — G40.909 SEIZURE DISORDER: ICD-10-CM

## 2023-02-24 DIAGNOSIS — G30.1 LATE ONSET ALZHEIMER'S DISEASE WITH BEHAVIORAL DISTURBANCE: ICD-10-CM

## 2023-02-24 DIAGNOSIS — R41.89 COGNITIVE DECLINE: ICD-10-CM

## 2023-02-24 DIAGNOSIS — R41.82 ALTERED MENTAL STATUS, UNSPECIFIED ALTERED MENTAL STATUS TYPE: ICD-10-CM

## 2023-02-24 LAB
ALBUMIN SERPL BCP-MCNC: 4.4 G/DL (ref 3.5–5.2)
ALP SERPL-CCNC: 91 U/L (ref 38–126)
ALT SERPL W/O P-5'-P-CCNC: 13 U/L (ref 10–44)
ANION GAP SERPL CALC-SCNC: 8 MMOL/L (ref 8–16)
ANISOCYTOSIS BLD QL SMEAR: SLIGHT
AST SERPL-CCNC: 23 U/L (ref 15–46)
BASOPHILS # BLD AUTO: 0.03 K/UL (ref 0–0.2)
BASOPHILS NFR BLD: 0.5 % (ref 0–1.9)
BILIRUB SERPL-MCNC: 0.4 MG/DL (ref 0.1–1)
CALCIUM SERPL-MCNC: 9.2 MG/DL (ref 8.7–10.5)
CHLORIDE SERPL-SCNC: 103 MMOL/L (ref 95–110)
CO2 SERPL-SCNC: 26 MMOL/L (ref 23–29)
CREAT SERPL-MCNC: 1.16 MG/DL (ref 0.5–1.4)
DIFFERENTIAL METHOD: ABNORMAL
EOSINOPHIL # BLD AUTO: 0.1 K/UL (ref 0–0.5)
EOSINOPHIL NFR BLD: 2.5 % (ref 0–8)
ERYTHROCYTE [DISTWIDTH] IN BLOOD BY AUTOMATED COUNT: 13 % (ref 11.5–14.5)
ERYTHROCYTE [SEDIMENTATION RATE] IN BLOOD BY PHOTOMETRIC METHOD: 55 MM/HR (ref 0–36)
EST. GFR  (NO RACE VARIABLE): 47.4 ML/MIN/1.73 M^2
FIBRINOGEN PPP-MCNC: 343 MG/DL (ref 182–400)
GLUCOSE SERPL-MCNC: 103 MG/DL (ref 70–110)
HCT VFR BLD AUTO: 28.2 % (ref 37–48.5)
HGB BLD-MCNC: 9 G/DL (ref 12–16)
HYPOCHROMIA BLD QL SMEAR: ABNORMAL
IMM GRANULOCYTES # BLD AUTO: 0.02 K/UL (ref 0–0.04)
IMM GRANULOCYTES NFR BLD AUTO: 0.4 % (ref 0–0.5)
LYMPHOCYTES # BLD AUTO: 1.8 K/UL (ref 1–4.8)
LYMPHOCYTES NFR BLD: 32.9 % (ref 18–48)
MCH RBC QN AUTO: 28.9 PG (ref 27–31)
MCHC RBC AUTO-ENTMCNC: 31.9 G/DL (ref 32–36)
MCV RBC AUTO: 91 FL (ref 82–98)
MONOCYTES # BLD AUTO: 0.4 K/UL (ref 0.3–1)
MONOCYTES NFR BLD: 7.4 % (ref 4–15)
NEUTROPHILS # BLD AUTO: 3.1 K/UL (ref 1.8–7.7)
NEUTROPHILS NFR BLD: 56.3 % (ref 38–73)
NRBC BLD-RTO: 0 /100 WBC
OVALOCYTES BLD QL SMEAR: ABNORMAL
PLATELET # BLD AUTO: 249 K/UL (ref 150–450)
PLATELET BLD QL SMEAR: ABNORMAL
PMV BLD AUTO: 10.8 FL (ref 9.2–12.9)
POIKILOCYTOSIS BLD QL SMEAR: SLIGHT
POTASSIUM SERPL-SCNC: 4.1 MMOL/L (ref 3.5–5.1)
PROT SERPL-MCNC: 8 G/DL (ref 6–8.4)
RBC # BLD AUTO: 3.11 M/UL (ref 4–5.4)
SODIUM SERPL-SCNC: 137 MMOL/L (ref 136–145)
UUN UR-MCNC: 14 MG/DL (ref 7–17)
WBC # BLD AUTO: 5.54 K/UL (ref 3.9–12.7)

## 2023-02-24 PROCEDURE — 80177 DRUG SCRN QUAN LEVETIRACETAM: CPT | Mod: PO | Performed by: PSYCHIATRY & NEUROLOGY

## 2023-02-24 PROCEDURE — 85652 RBC SED RATE AUTOMATED: CPT | Mod: PO | Performed by: PSYCHIATRY & NEUROLOGY

## 2023-02-24 PROCEDURE — 85384 FIBRINOGEN ACTIVITY: CPT | Mod: PO | Performed by: PSYCHIATRY & NEUROLOGY

## 2023-02-24 PROCEDURE — 85025 COMPLETE CBC W/AUTO DIFF WBC: CPT | Mod: PO | Performed by: PSYCHIATRY & NEUROLOGY

## 2023-02-24 PROCEDURE — 36415 COLL VENOUS BLD VENIPUNCTURE: CPT | Mod: PO | Performed by: PSYCHIATRY & NEUROLOGY

## 2023-02-24 PROCEDURE — 80053 COMPREHEN METABOLIC PANEL: CPT | Mod: PO | Performed by: PSYCHIATRY & NEUROLOGY

## 2023-02-27 LAB — LEVETIRACETAM SERPL-MCNC: <1 UG/ML (ref 3–60)

## 2023-06-06 ENCOUNTER — HOSPITAL ENCOUNTER (EMERGENCY)
Facility: HOSPITAL | Age: 83
Discharge: ANOTHER HOSPITAL | End: 2023-06-06
Attending: EMERGENCY MEDICINE
Payer: MEDICARE

## 2023-06-06 VITALS
DIASTOLIC BLOOD PRESSURE: 75 MMHG | SYSTOLIC BLOOD PRESSURE: 131 MMHG | HEIGHT: 60 IN | RESPIRATION RATE: 18 BRPM | HEART RATE: 87 BPM | TEMPERATURE: 99 F | OXYGEN SATURATION: 97 % | BODY MASS INDEX: 21.6 KG/M2 | WEIGHT: 110 LBS

## 2023-06-06 DIAGNOSIS — M25.561 RIGHT KNEE PAIN: ICD-10-CM

## 2023-06-06 DIAGNOSIS — Z01.818 PRE-OP EVALUATION: ICD-10-CM

## 2023-06-06 DIAGNOSIS — M25.551 RIGHT HIP PAIN: ICD-10-CM
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
## 2023-06-06 DIAGNOSIS — S72.401A CLOSED FRACTURE OF DISTAL END OF RIGHT FEMUR, UNSPECIFIED FRACTURE MORPHOLOGY, INITIAL ENCOUNTER: Primary | ICD-10-CM

## 2023-06-06 LAB
ALBUMIN SERPL BCP-MCNC: 4.4 G/DL (ref 3.5–5.2)
ALP SERPL-CCNC: 99 U/L (ref 38–126)
ALT SERPL W/O P-5'-P-CCNC: 14 U/L (ref 10–44)
ANION GAP SERPL CALC-SCNC: 13 MMOL/L (ref 8–16)
AST SERPL-CCNC: 22 U/L (ref 15–46)
BASOPHILS # BLD AUTO: 0.03 K/UL (ref 0–0.2)
BASOPHILS NFR BLD: 0.3 % (ref 0–1.9)
BILIRUB SERPL-MCNC: 0.4 MG/DL (ref 0.1–1)
CALCIUM SERPL-MCNC: 9 MG/DL (ref 8.7–10.5)
CHLORIDE SERPL-SCNC: 104 MMOL/L (ref 95–110)
CO2 SERPL-SCNC: 24 MMOL/L (ref 23–29)
CREAT SERPL-MCNC: 1.33 MG/DL (ref 0.5–1.4)
DIFFERENTIAL METHOD: ABNORMAL
EOSINOPHIL # BLD AUTO: 0 K/UL (ref 0–0.5)
EOSINOPHIL NFR BLD: 0.2 % (ref 0–8)
ERYTHROCYTE [DISTWIDTH] IN BLOOD BY AUTOMATED COUNT: 15.1 % (ref 11.5–14.5)
EST. GFR  (NO RACE VARIABLE): 39.9 ML/MIN/1.73 M^2
GLUCOSE SERPL-MCNC: 138 MG/DL (ref 70–110)
HCT VFR BLD AUTO: 28 % (ref 37–48.5)
HGB BLD-MCNC: 8.4 G/DL (ref 12–16)
IMM GRANULOCYTES # BLD AUTO: 0.03 K/UL (ref 0–0.04)
IMM GRANULOCYTES NFR BLD AUTO: 0.3 % (ref 0–0.5)
INR PPP: 1 (ref 0.8–1.2)
LYMPHOCYTES # BLD AUTO: 0.9 K/UL (ref 1–4.8)
LYMPHOCYTES NFR BLD: 9.8 % (ref 18–48)
MCH RBC QN AUTO: 25.6 PG (ref 27–31)
MCHC RBC AUTO-ENTMCNC: 30 G/DL (ref 32–36)
MCV RBC AUTO: 85 FL (ref 82–98)
MONOCYTES # BLD AUTO: 0.4 K/UL (ref 0.3–1)
MONOCYTES NFR BLD: 4.6 % (ref 4–15)
NEUTROPHILS # BLD AUTO: 7.8 K/UL (ref 1.8–7.7)
NEUTROPHILS NFR BLD: 84.8 % (ref 38–73)
NRBC BLD-RTO: 0 /100 WBC
PLATELET # BLD AUTO: 275 K/UL (ref 150–450)
PMV BLD AUTO: 12.3 FL (ref 9.2–12.9)
POTASSIUM SERPL-SCNC: 4.5 MMOL/L (ref 3.5–5.1)
PROT SERPL-MCNC: 8.4 G/DL (ref 6–8.4)
PROTHROMBIN TIME: 11.1 SEC (ref 9–12.5)
RBC # BLD AUTO: 3.28 M/UL (ref 4–5.4)
SODIUM SERPL-SCNC: 141 MMOL/L (ref 136–145)
UUN UR-MCNC: 15 MG/DL (ref 7–17)
WBC # BLD AUTO: 9.19 K/UL (ref 3.9–12.7)

## 2023-06-06 PROCEDURE — 85025 COMPLETE CBC W/AUTO DIFF WBC: CPT | Mod: ER | Performed by: EMERGENCY MEDICINE

## 2023-06-06 PROCEDURE — 96374 THER/PROPH/DIAG INJ IV PUSH: CPT | Mod: ER

## 2023-06-06 PROCEDURE — 93010 EKG 12-LEAD: ICD-10-PCS | Mod: ,,, | Performed by: INTERNAL MEDICINE

## 2023-06-06 PROCEDURE — 63600175 PHARM REV CODE 636 W HCPCS: Mod: ER | Performed by: EMERGENCY MEDICINE

## 2023-06-06 PROCEDURE — 80053 COMPREHEN METABOLIC PANEL: CPT | Mod: ER | Performed by: EMERGENCY MEDICINE

## 2023-06-06 PROCEDURE — 93005 ELECTROCARDIOGRAM TRACING: CPT | Mod: ER

## 2023-06-06 PROCEDURE — 96375 TX/PRO/DX INJ NEW DRUG ADDON: CPT | Mod: ER

## 2023-06-06 PROCEDURE — 99285 EMERGENCY DEPT VISIT HI MDM: CPT | Mod: 25,ER

## 2023-06-06 PROCEDURE — 85610 PROTHROMBIN TIME: CPT | Mod: ER | Performed by: EMERGENCY MEDICINE

## 2023-06-06 PROCEDURE — 93010 ELECTROCARDIOGRAM REPORT: CPT | Mod: ,,, | Performed by: INTERNAL MEDICINE

## 2023-06-06 RX ORDER — MORPHINE SULFATE 4 MG/ML
4 INJECTION, SOLUTION INTRAMUSCULAR; INTRAVENOUS
Status: COMPLETED | OUTPATIENT
Start: 2023-06-06 | End: 2023-06-06

## 2023-06-06 RX ORDER — ONDANSETRON 2 MG/ML
4 INJECTION INTRAMUSCULAR; INTRAVENOUS
Status: COMPLETED | OUTPATIENT
Start: 2023-06-06 | End: 2023-06-06

## 2023-06-06 RX ADMIN — MORPHINE SULFATE 4 MG: 4 INJECTION INTRAVENOUS at 09:06

## 2023-06-06 RX ADMIN — ONDANSETRON 4 MG: 2 INJECTION INTRAMUSCULAR; INTRAVENOUS at 09:06

## 2023-06-07 ENCOUNTER — HOSPITAL ENCOUNTER (INPATIENT)
Facility: HOSPITAL | Age: 83
LOS: 5 days | Discharge: HOME | DRG: 481 | End: 2023-06-12
Attending: EMERGENCY MEDICINE | Admitting: INTERNAL MEDICINE
Payer: MEDICARE

## 2023-06-07 ENCOUNTER — ANESTHESIA EVENT (OUTPATIENT)
Dept: SURGERY | Facility: HOSPITAL | Age: 83
DRG: 481 | End: 2023-06-07
Payer: MEDICARE

## 2023-06-07 DIAGNOSIS — S72.491A OTHER CLOSED FRACTURE OF DISTAL END OF RIGHT FEMUR, INITIAL ENCOUNTER: ICD-10-CM

## 2023-06-07 DIAGNOSIS — M97.11XA PERIPROSTHETIC FRACTURE AROUND INTERNAL PROSTHETIC RIGHT KNEE JOINT, INITIAL ENCOUNTER: ICD-10-CM

## 2023-06-07 DIAGNOSIS — S72.401D CLOSED FRACTURE OF DISTAL END OF RIGHT FEMUR WITH ROUTINE HEALING, UNSPECIFIED FRACTURE MORPHOLOGY, SUBSEQUENT ENCOUNTER: ICD-10-CM

## 2023-06-07 DIAGNOSIS — R07.9 CHEST PAIN: ICD-10-CM

## 2023-06-07 DIAGNOSIS — S72.401A CLOSED FRACTURE OF RIGHT DISTAL FEMUR: Primary | ICD-10-CM

## 2023-06-07 PROBLEM — Z01.810 PREOP CARDIOVASCULAR EXAM: Status: ACTIVE | Noted: 2023-06-07

## 2023-06-07 PROBLEM — M97.9XXA PERIPROSTHETIC FRACTURE AROUND INTERNAL PROSTHETIC JOINT: Status: ACTIVE | Noted: 2023-06-07

## 2023-06-07 PROBLEM — R93.0 RIGHT MAXILLARY SINUS OPACIFICATION: Status: RESOLVED | Noted: 2022-06-17 | Resolved: 2023-06-07

## 2023-06-07 PROBLEM — D63.8 ANEMIA OF CHRONIC DISEASE: Status: ACTIVE | Noted: 2023-06-07

## 2023-06-07 PROBLEM — N39.0 ACUTE URINARY TRACT INFECTION: Status: RESOLVED | Noted: 2022-08-05 | Resolved: 2023-06-07

## 2023-06-07 PROBLEM — N18.31 STAGE 3A CHRONIC KIDNEY DISEASE: Status: RESOLVED | Noted: 2021-01-13 | Resolved: 2023-06-07

## 2023-06-07 LAB
25(OH)D3+25(OH)D2 SERPL-MCNC: 27 NG/ML (ref 30–96)
ABO + RH BLD: NORMAL
ALBUMIN SERPL BCP-MCNC: 3.3 G/DL (ref 3.5–5.2)
ALP SERPL-CCNC: 99 U/L (ref 55–135)
ALT SERPL W/O P-5'-P-CCNC: 8 U/L (ref 10–44)
ANION GAP SERPL CALC-SCNC: 9 MMOL/L (ref 8–16)
AST SERPL-CCNC: 13 U/L (ref 10–40)
BACTERIA #/AREA URNS AUTO: ABNORMAL /HPF
BASOPHILS # BLD AUTO: 0.01 K/UL (ref 0–0.2)
BASOPHILS NFR BLD: 0.1 % (ref 0–1.9)
BILIRUB SERPL-MCNC: 0.5 MG/DL (ref 0.1–1)
BILIRUB UR QL STRIP: NEGATIVE
BLD GP AB SCN CELLS X3 SERPL QL: NORMAL
BNP SERPL-MCNC: 107 PG/ML (ref 0–99)
BUN SERPL-MCNC: 16 MG/DL (ref 8–23)
CALCIUM SERPL-MCNC: 9.2 MG/DL (ref 8.7–10.5)
CHLORIDE SERPL-SCNC: 104 MMOL/L (ref 95–110)
CLARITY UR REFRACT.AUTO: CLEAR
CO2 SERPL-SCNC: 25 MMOL/L (ref 23–29)
COLOR UR AUTO: YELLOW
CREAT SERPL-MCNC: 1.4 MG/DL (ref 0.5–1.4)
DIFFERENTIAL METHOD: ABNORMAL
EOSINOPHIL # BLD AUTO: 0 K/UL (ref 0–0.5)
EOSINOPHIL NFR BLD: 0.1 % (ref 0–8)
ERYTHROCYTE [DISTWIDTH] IN BLOOD BY AUTOMATED COUNT: 15.1 % (ref 11.5–14.5)
EST. GFR  (NO RACE VARIABLE): 37.6 ML/MIN/1.73 M^2
ESTIMATED AVG GLUCOSE: 114 MG/DL (ref 68–131)
FERRITIN SERPL-MCNC: 26 NG/ML (ref 20–300)
GLUCOSE SERPL-MCNC: 136 MG/DL (ref 70–110)
GLUCOSE UR QL STRIP: NEGATIVE
HBA1C MFR BLD: 5.6 % (ref 4–5.6)
HCT VFR BLD AUTO: 24.8 % (ref 37–48.5)
HCV AB SERPL QL IA: NORMAL
HGB BLD-MCNC: 7.7 G/DL (ref 12–16)
HGB UR QL STRIP: NEGATIVE
HIV 1+2 AB+HIV1 P24 AG SERPL QL IA: NORMAL
HYALINE CASTS UR QL AUTO: 3 /LPF
IMM GRANULOCYTES # BLD AUTO: 0.02 K/UL (ref 0–0.04)
IMM GRANULOCYTES NFR BLD AUTO: 0.2 % (ref 0–0.5)
IRON SERPL-MCNC: 37 UG/DL (ref 30–160)
KETONES UR QL STRIP: NEGATIVE
LEUKOCYTE ESTERASE UR QL STRIP: ABNORMAL
LYMPHOCYTES # BLD AUTO: 1.1 K/UL (ref 1–4.8)
LYMPHOCYTES NFR BLD: 12.2 % (ref 18–48)
MAGNESIUM SERPL-MCNC: 1.6 MG/DL (ref 1.6–2.6)
MAGNESIUM SERPL-MCNC: 1.6 MG/DL (ref 1.6–2.6)
MCH RBC QN AUTO: 25.5 PG (ref 27–31)
MCHC RBC AUTO-ENTMCNC: 31 G/DL (ref 32–36)
MCV RBC AUTO: 82 FL (ref 82–98)
MICROSCOPIC COMMENT: ABNORMAL
MONOCYTES # BLD AUTO: 0.5 K/UL (ref 0.3–1)
MONOCYTES NFR BLD: 5.6 % (ref 4–15)
NEUTROPHILS # BLD AUTO: 7 K/UL (ref 1.8–7.7)
NEUTROPHILS NFR BLD: 81.8 % (ref 38–73)
NITRITE UR QL STRIP: NEGATIVE
NRBC BLD-RTO: 0 /100 WBC
PH UR STRIP: 5 [PH] (ref 5–8)
PHOSPHATE SERPL-MCNC: 3.2 MG/DL (ref 2.7–4.5)
PHOSPHATE SERPL-MCNC: 3.2 MG/DL (ref 2.7–4.5)
PLATELET # BLD AUTO: 246 K/UL (ref 150–450)
PMV BLD AUTO: 11.5 FL (ref 9.2–12.9)
POCT GLUCOSE: 141 MG/DL (ref 70–110)
POCT GLUCOSE: 170 MG/DL (ref 70–110)
POTASSIUM SERPL-SCNC: 4.6 MMOL/L (ref 3.5–5.1)
PREALB SERPL-MCNC: 18 MG/DL (ref 20–43)
PROCALCITONIN SERPL IA-MCNC: 0.02 NG/ML
PROT SERPL-MCNC: 7.2 G/DL (ref 6–8.4)
PROT UR QL STRIP: NEGATIVE
RBC # BLD AUTO: 3.02 M/UL (ref 4–5.4)
RBC #/AREA URNS AUTO: 8 /HPF (ref 0–4)
SATURATED IRON: 10 % (ref 20–50)
SODIUM SERPL-SCNC: 138 MMOL/L (ref 136–145)
SP GR UR STRIP: 1.01 (ref 1–1.03)
SPECIMEN OUTDATE: NORMAL
SQUAMOUS #/AREA URNS AUTO: 1 /HPF
TOTAL IRON BINDING CAPACITY: 373 UG/DL (ref 250–450)
TRANSFERRIN SERPL-MCNC: 252 MG/DL (ref 200–375)
TRANSFERRIN SERPL-MCNC: 262 MG/DL (ref 200–375)
TROPONIN I SERPL DL<=0.01 NG/ML-MCNC: 0.01 NG/ML (ref 0–0.03)
URN SPEC COLLECT METH UR: ABNORMAL
WBC # BLD AUTO: 8.58 K/UL (ref 3.9–12.7)
WBC #/AREA URNS AUTO: 3 /HPF (ref 0–5)

## 2023-06-07 PROCEDURE — 82728 ASSAY OF FERRITIN: CPT | Performed by: STUDENT IN AN ORGANIZED HEALTH CARE EDUCATION/TRAINING PROGRAM

## 2023-06-07 PROCEDURE — 25000003 PHARM REV CODE 250: Performed by: STUDENT IN AN ORGANIZED HEALTH CARE EDUCATION/TRAINING PROGRAM

## 2023-06-07 PROCEDURE — 99223 PR INITIAL HOSPITAL CARE,LEVL III: ICD-10-PCS | Mod: ,,, | Performed by: STUDENT IN AN ORGANIZED HEALTH CARE EDUCATION/TRAINING PROGRAM

## 2023-06-07 PROCEDURE — 36430 TRANSFUSION BLD/BLD COMPNT: CPT

## 2023-06-07 PROCEDURE — 83735 ASSAY OF MAGNESIUM: CPT | Mod: 91 | Performed by: STUDENT IN AN ORGANIZED HEALTH CARE EDUCATION/TRAINING PROGRAM

## 2023-06-07 PROCEDURE — 85025 COMPLETE CBC W/AUTO DIFF WBC: CPT | Performed by: STUDENT IN AN ORGANIZED HEALTH CARE EDUCATION/TRAINING PROGRAM

## 2023-06-07 PROCEDURE — 29505 APPLICATION LONG LEG SPLINT: CPT | Mod: RT

## 2023-06-07 PROCEDURE — 86920 COMPATIBILITY TEST SPIN: CPT | Performed by: STUDENT IN AN ORGANIZED HEALTH CARE EDUCATION/TRAINING PROGRAM

## 2023-06-07 PROCEDURE — 99223 PR INITIAL HOSPITAL CARE,LEVL III: ICD-10-PCS | Mod: 57,,, | Performed by: ORTHOPAEDIC SURGERY

## 2023-06-07 PROCEDURE — 51702 INSERT TEMP BLADDER CATH: CPT

## 2023-06-07 PROCEDURE — 83036 HEMOGLOBIN GLYCOSYLATED A1C: CPT | Performed by: STUDENT IN AN ORGANIZED HEALTH CARE EDUCATION/TRAINING PROGRAM

## 2023-06-07 PROCEDURE — 84484 ASSAY OF TROPONIN QUANT: CPT | Performed by: STUDENT IN AN ORGANIZED HEALTH CARE EDUCATION/TRAINING PROGRAM

## 2023-06-07 PROCEDURE — 82306 VITAMIN D 25 HYDROXY: CPT | Performed by: STUDENT IN AN ORGANIZED HEALTH CARE EDUCATION/TRAINING PROGRAM

## 2023-06-07 PROCEDURE — 36415 COLL VENOUS BLD VENIPUNCTURE: CPT | Performed by: STUDENT IN AN ORGANIZED HEALTH CARE EDUCATION/TRAINING PROGRAM

## 2023-06-07 PROCEDURE — 83880 ASSAY OF NATRIURETIC PEPTIDE: CPT | Performed by: STUDENT IN AN ORGANIZED HEALTH CARE EDUCATION/TRAINING PROGRAM

## 2023-06-07 PROCEDURE — 86900 BLOOD TYPING SEROLOGIC ABO: CPT | Performed by: STUDENT IN AN ORGANIZED HEALTH CARE EDUCATION/TRAINING PROGRAM

## 2023-06-07 PROCEDURE — 84466 ASSAY OF TRANSFERRIN: CPT | Mod: 91 | Performed by: STUDENT IN AN ORGANIZED HEALTH CARE EDUCATION/TRAINING PROGRAM

## 2023-06-07 PROCEDURE — 63600175 PHARM REV CODE 636 W HCPCS: Performed by: STUDENT IN AN ORGANIZED HEALTH CARE EDUCATION/TRAINING PROGRAM

## 2023-06-07 PROCEDURE — 99285 PR EMERGENCY DEPT VISIT,LEVEL V: ICD-10-PCS | Mod: ,,, | Performed by: PHYSICIAN ASSISTANT

## 2023-06-07 PROCEDURE — 99223 1ST HOSP IP/OBS HIGH 75: CPT | Mod: ,,, | Performed by: STUDENT IN AN ORGANIZED HEALTH CARE EDUCATION/TRAINING PROGRAM

## 2023-06-07 PROCEDURE — 99285 EMERGENCY DEPT VISIT HI MDM: CPT | Mod: ,,, | Performed by: PHYSICIAN ASSISTANT

## 2023-06-07 PROCEDURE — 80053 COMPREHEN METABOLIC PANEL: CPT | Performed by: STUDENT IN AN ORGANIZED HEALTH CARE EDUCATION/TRAINING PROGRAM

## 2023-06-07 PROCEDURE — 84134 ASSAY OF PREALBUMIN: CPT | Performed by: STUDENT IN AN ORGANIZED HEALTH CARE EDUCATION/TRAINING PROGRAM

## 2023-06-07 PROCEDURE — 25000003 PHARM REV CODE 250: Performed by: INTERNAL MEDICINE

## 2023-06-07 PROCEDURE — P9016 RBC LEUKOCYTES REDUCED: HCPCS | Performed by: STUDENT IN AN ORGANIZED HEALTH CARE EDUCATION/TRAINING PROGRAM

## 2023-06-07 PROCEDURE — 83735 ASSAY OF MAGNESIUM: CPT | Performed by: STUDENT IN AN ORGANIZED HEALTH CARE EDUCATION/TRAINING PROGRAM

## 2023-06-07 PROCEDURE — 84100 ASSAY OF PHOSPHORUS: CPT | Performed by: STUDENT IN AN ORGANIZED HEALTH CARE EDUCATION/TRAINING PROGRAM

## 2023-06-07 PROCEDURE — 99223 1ST HOSP IP/OBS HIGH 75: CPT | Mod: 57,,, | Performed by: ORTHOPAEDIC SURGERY

## 2023-06-07 PROCEDURE — 84145 PROCALCITONIN (PCT): CPT | Performed by: STUDENT IN AN ORGANIZED HEALTH CARE EDUCATION/TRAINING PROGRAM

## 2023-06-07 PROCEDURE — 11000001 HC ACUTE MED/SURG PRIVATE ROOM

## 2023-06-07 PROCEDURE — 81001 URINALYSIS AUTO W/SCOPE: CPT | Performed by: STUDENT IN AN ORGANIZED HEALTH CARE EDUCATION/TRAINING PROGRAM

## 2023-06-07 PROCEDURE — 99285 EMERGENCY DEPT VISIT HI MDM: CPT | Mod: 25

## 2023-06-07 PROCEDURE — 87389 HIV-1 AG W/HIV-1&-2 AB AG IA: CPT | Performed by: PHYSICIAN ASSISTANT

## 2023-06-07 PROCEDURE — 86803 HEPATITIS C AB TEST: CPT | Performed by: PHYSICIAN ASSISTANT

## 2023-06-07 PROCEDURE — 84100 ASSAY OF PHOSPHORUS: CPT | Mod: 91 | Performed by: STUDENT IN AN ORGANIZED HEALTH CARE EDUCATION/TRAINING PROGRAM

## 2023-06-07 PROCEDURE — 84466 ASSAY OF TRANSFERRIN: CPT | Performed by: STUDENT IN AN ORGANIZED HEALTH CARE EDUCATION/TRAINING PROGRAM

## 2023-06-07 RX ORDER — HYDROCODONE BITARTRATE AND ACETAMINOPHEN 500; 5 MG/1; MG/1
TABLET ORAL
Status: DISCONTINUED | OUTPATIENT
Start: 2023-06-07 | End: 2023-06-12 | Stop reason: HOSPADM

## 2023-06-07 RX ORDER — LEVETIRACETAM 250 MG/1
250 TABLET ORAL NIGHTLY
Status: DISCONTINUED | OUTPATIENT
Start: 2023-06-07 | End: 2023-06-12 | Stop reason: HOSPADM

## 2023-06-07 RX ORDER — SODIUM CHLORIDE 0.9 % (FLUSH) 0.9 %
10 SYRINGE (ML) INJECTION EVERY 12 HOURS PRN
Status: DISCONTINUED | OUTPATIENT
Start: 2023-06-07 | End: 2023-06-12 | Stop reason: HOSPADM

## 2023-06-07 RX ORDER — MIRTAZAPINE 15 MG/1
15 TABLET, FILM COATED ORAL NIGHTLY
Status: DISCONTINUED | OUTPATIENT
Start: 2023-06-07 | End: 2023-06-12 | Stop reason: HOSPADM

## 2023-06-07 RX ORDER — DEXTROSE 40 %
30 GEL (GRAM) ORAL
Status: DISCONTINUED | OUTPATIENT
Start: 2023-06-07 | End: 2023-06-12 | Stop reason: HOSPADM

## 2023-06-07 RX ORDER — FERROUS SULFATE, DRIED 160(50) MG
2 TABLET, EXTENDED RELEASE ORAL DAILY
Status: DISCONTINUED | OUTPATIENT
Start: 2023-06-07 | End: 2023-06-07

## 2023-06-07 RX ORDER — DEXTROSE 40 %
15 GEL (GRAM) ORAL
Status: DISCONTINUED | OUTPATIENT
Start: 2023-06-07 | End: 2023-06-12 | Stop reason: HOSPADM

## 2023-06-07 RX ORDER — ONDANSETRON 2 MG/ML
4 INJECTION INTRAMUSCULAR; INTRAVENOUS EVERY 8 HOURS PRN
Status: DISCONTINUED | OUTPATIENT
Start: 2023-06-07 | End: 2023-06-12 | Stop reason: HOSPADM

## 2023-06-07 RX ORDER — NALOXONE HCL 0.4 MG/ML
0.02 VIAL (ML) INJECTION
Status: DISCONTINUED | OUTPATIENT
Start: 2023-06-07 | End: 2023-06-12 | Stop reason: HOSPADM

## 2023-06-07 RX ORDER — CHOLECALCIFEROL (VITAMIN D3) 25 MCG
1000 TABLET ORAL DAILY
Status: DISCONTINUED | OUTPATIENT
Start: 2023-06-08 | End: 2023-06-12 | Stop reason: HOSPADM

## 2023-06-07 RX ORDER — MORPHINE SULFATE 2 MG/ML
2 INJECTION, SOLUTION INTRAMUSCULAR; INTRAVENOUS EVERY 4 HOURS PRN
Status: DISCONTINUED | OUTPATIENT
Start: 2023-06-07 | End: 2023-06-08

## 2023-06-07 RX ORDER — GLUCAGON 1 MG
1 KIT INJECTION
Status: DISCONTINUED | OUTPATIENT
Start: 2023-06-07 | End: 2023-06-12 | Stop reason: HOSPADM

## 2023-06-07 RX ORDER — HEPARIN SODIUM 5000 [USP'U]/ML
5000 INJECTION, SOLUTION INTRAVENOUS; SUBCUTANEOUS EVERY 8 HOURS
Status: DISCONTINUED | OUTPATIENT
Start: 2023-06-07 | End: 2023-06-08

## 2023-06-07 RX ORDER — MUPIROCIN 20 MG/G
OINTMENT TOPICAL 2 TIMES DAILY
Status: DISCONTINUED | OUTPATIENT
Start: 2023-06-07 | End: 2023-06-12

## 2023-06-07 RX ORDER — ACETAMINOPHEN 325 MG/1
650 TABLET ORAL EVERY 4 HOURS PRN
Status: DISCONTINUED | OUTPATIENT
Start: 2023-06-07 | End: 2023-06-08

## 2023-06-07 RX ADMIN — HEPARIN SODIUM 5000 UNITS: 5000 INJECTION INTRAVENOUS; SUBCUTANEOUS at 10:06

## 2023-06-07 RX ADMIN — MIRTAZAPINE 15 MG: 15 TABLET, FILM COATED ORAL at 09:06

## 2023-06-07 RX ADMIN — MUPIROCIN: 20 OINTMENT TOPICAL at 09:06

## 2023-06-07 RX ADMIN — THERA TABS 1 TABLET: TAB at 10:06

## 2023-06-07 RX ADMIN — ACETAMINOPHEN 650 MG: 325 TABLET ORAL at 09:06

## 2023-06-07 RX ADMIN — MIRTAZAPINE 15 MG: 15 TABLET, FILM COATED ORAL at 03:06

## 2023-06-07 RX ADMIN — LEVETIRACETAM 250 MG: 250 TABLET, FILM COATED ORAL at 09:06

## 2023-06-07 RX ADMIN — Medication 2 TABLET: at 10:06

## 2023-06-07 RX ADMIN — ACETAMINOPHEN 650 MG: 325 TABLET ORAL at 11:06

## 2023-06-07 RX ADMIN — CEFTRIAXONE 1 G: 1 INJECTION, POWDER, FOR SOLUTION INTRAMUSCULAR; INTRAVENOUS at 09:06

## 2023-06-07 NOTE — CONSULTS
[facility] Dept  Orthopedics  Consult Note    Patient Name: Ally Steiner  MRN: [id number]  Admission Date: 6/7/2023  Hospital Length of Stay: 0 days  Attending Provider: Maria Guadalupe Fang MD  Primary Care Provider: Josh Fajardo MD      Inpatient consult to Orthopedic Surgery  Consult performed by: Juarez Champion MD  Consult ordered by: Dar Rivera PA-C        Subjective:     Principal Problem:Periprosthetic fracture around internal prosthetic joint    Chief Complaint:   Chief Complaint   Patient presents with    Transfer    Leg Injury     From [facility], needs ortho for R femur fx        HPI: Patient is an 83-year-old female who suffered a ground level fall earlier today with immediate pain inability to bear weight on her right leg.  She presented to an outside emergency room where x-rays showed a right periprosthetic distal femur fracture around a prior total knee arthroplasty.  She is unsure who the original surgeon for this total knee replacement was.  She has a past medical history significant for dementia but is otherwise healthy.  She takes no anticoagulation at home.  She uses a wheelchair for distances due to dementia but ambulates independently in her home.  She lives with her daughter.        Past Medical History:   Diagnosis Date    History of hypertension     always been able to manage with diet    Hx of diabetes mellitus     Always controlled with diet and weight loss    Hyperlipidemia     always been able to manage with diet    Memory loss     Osteoarthritis        Past Surgical History:   Procedure Laterality Date    CHOLECYSTECTOMY      Hysterectomy      KNEE SURGERY Left 2018    knee replacement    KNEE SURGERY Right 2000    knee replacement    OPEN REDUCTION OF FRACTURE OF MANDIBLE Left 1/6/2021    Procedure: OPEN REDUCTION, FRACTURE, MANDIBLE;  Surgeon: Dar Mcgill MD;  Location: [facility] OR [address] [place] [address];  Service: ENT;  Laterality: Left;    REDUCTION OF  BOTH BREASTS Bilateral 1990       Review of patient's allergies indicates:  No Known Allergies    Current Facility-Administered Medications   Medication    calcium-vitamin D3 500 mg-5 mcg (200 unit) per tablet 2 tablet     Current Outpatient Medications   Medication Sig    ferrous sulfate 220 mg (44 mg iron)/5 mL solution Take 220 mg by mouth.    furosemide (LASIX) 20 MG tablet TAKE 1 TABLET BY MOUTH ONCE DAILY AS NEEDED FOR EDEMA    levETIRAcetam (KEPPRA) 250 MG Tab Take 1 tablet (250 mg total) by mouth nightly. (Patient not taking: Reported on 3/15/2023)    mirtazapine (REMERON) 15 MG tablet Take 15 mg by mouth every evening.    multivitamin capsule Take 1 capsule by mouth once daily.    potassium chloride SA (K-DUR,KLOR-CON M) 10 MEQ tablet Take 10 mEq by mouth daily as needed.    venlafaxine (EFFEXOR-XR) 37.5 MG 24 hr capsule Take 1 capsule (37.5 mg total) by mouth once daily. (Patient not taking: Reported on 3/15/2023)     Family History    None       Tobacco Use    Smoking status: Never    Smokeless tobacco: Never   Substance and Sexual Activity    Alcohol use: Never    Drug use: Never    Sexual activity: Not on file     ROS  Unable to completely obtain due to dementia    Objective:     Vital Signs (Most Recent):  Temp: 99.4 °F (37.4 °C) (06/07/23 0002)  Pulse: 88 (06/07/23 0002)  Resp: 18 (06/07/23 0002)  BP: 136/82 (06/07/23 0002)  SpO2: 99 % (06/07/23 0002) Vital Signs (24h Range):  Temp:  [98.6 °F (37 °C)-99.4 °F (37.4 °C)] 99.4 °F (37.4 °C)  Pulse:  [83-88] 88  Resp:  [18-20] 18  SpO2:  [97 %-100 %] 99 %  BP: (131-165)/(73-82) 136/82     Weight: 49.9 kg (110 lb)     Body mass index is 21.48 kg/m².    No intake or output data in the 24 hours ending 06/07/23 0156     Ortho/SPM Exam  General:  no acute distress, appears stated age   Neuro: alert and oriented x3  Psych: normal mood  Head: normocephalic, atraumatic.  Eyes: no scleral icterus  Mouth: moist mucous membranes  Cardiovascular:  extremities warm and well perfused  Lungs: breathing comfortably, equal chest rise bilat  Skin: clean, dry, intact (any exceptions noted in below musculoskeletal exam)       Musculoskeletal  Right Upper Extremity     -Skin, Intact : no ecchymosis, lesions, lacerations or abrasions   -Deltoid, biceps, triceps intact   -ROM full range of motion to the shoulder wrist and elbow  -SILT M/R/U/Ax  -Motor intact Ain/PIN/U/Ax  -WWP     Left Upper Extremity     -Skin, Intact : no ecchymosis, lesions, lacerations or abrasions   -Deltoid, biceps, triceps intact   -ROM full range of motion to the shoulder wrist and elbow  -SILT M/R/U/Ax  -Motor intact Ain/PIN/U/Ax  -WWP     Right Lower Extremity Exam     - Skin Intact : no ecchymosis, lesions, lacerations or abrasions, no skin tenting  - ROM range of motion deferred to the right knee due to known fracture, full range of motion to the right ankle, mild pain to palpation around the right hip, no significant pain with minimal log roll of the right leg.  - TA/EHL/Gastroc/FHL intact  - SILT throughout  - warm well-perfused foot with 2+ dorsalis pedis pulses        Left Lower Extremity Exam    - Skin Intact : no ecchymosis, lesions, lacerations or abrasions   - Quad/ Hip flexor intact   - ROM full range of motion to the left hip knee and ankle  - TA/EHL/Gastroc/FHL intact  - SILT throughout  - 2+ dorsalis pedis pulses    Spine:  No cervical spine tenderness       All joints (shoulder/elbow/wrist/hip/knee/ankle) were examined and had full ROM and were non-tender to palpation except as above          Significant Labs: CBC:   Recent Labs   Lab 06/06/23  2254   WBC 9.19   HGB 8.4*   HCT 28.0*        CMP:   Recent Labs   Lab 06/06/23  2254      K 4.5      CO2 24   *   BUN 15   CREATININE 1.33   CALCIUM 9.0   PROT 8.4   ALBUMIN 4.4   BILITOT 0.4   ALKPHOS 99   AST 22   ALT 14   ANIONGAP 13     All pertinent labs within the past 24 hours have been  reviewed.    Significant Imaging: I have reviewed and interpreted all pertinent imaging results/findings.  X-ray of the right knee shows right periprosthetic distal femur fracture, CT knee and pelvis are pending.    Assessment/Plan:     * Periprosthetic fracture of the right distal femur  Patient is a 83-year-old female who presents with a right distal femur periprosthetic fracture after a ground level fall earlier today.  She ambulates independently in her home but uses a wheelchair for distances due to dementia.  She has a past medical history significant only for dementia.  She takes no anticoagulation at home.  She lives with her daughter.  On exam she has 2+ dorsalis pedis pulses and ABIs >1.  There is no skin tenting around the fracture site and compartments of the right lower extremity are soft and compressible.  She has intact sensation to light touch in the foot.    --admitted to Medicine for preoperative medical optimization  --NPO  --knee immobilizer placed in the right lower extremity  --nonweightbearing right lower extremity  --we will discuss further plan with staff in the morning      Juarez Champion MD  Orthopedics  [facility]

## 2023-06-07 NOTE — SUBJECTIVE & OBJECTIVE
Past Medical History:   Diagnosis Date    History of hypertension     always been able to manage with diet    Hx of diabetes mellitus     Always controlled with diet and weight loss    Hyperlipidemia     always been able to manage with diet    Memory loss     Osteoarthritis        Past Surgical History:   Procedure Laterality Date    CHOLECYSTECTOMY      Hysterectomy      KNEE SURGERY Left 2018    knee replacement    KNEE SURGERY Right 2000    knee replacement    OPEN REDUCTION OF FRACTURE OF MANDIBLE Left 1/6/2021    Procedure: OPEN REDUCTION, FRACTURE, MANDIBLE;  Surgeon: Dar Mcgill MD;  Location: [facility] OR [address] [place] [address];  Service: ENT;  Laterality: Left;    REDUCTION OF BOTH BREASTS Bilateral 1990       Review of patient's allergies indicates:  No Known Allergies    Current Facility-Administered Medications on File Prior to Encounter   Medication    [COMPLETED] morphine injection 4 mg    [COMPLETED] ondansetron injection 4 mg     Current Outpatient Medications on File Prior to Encounter   Medication Sig    ferrous sulfate 220 mg (44 mg iron)/5 mL solution Take 220 mg by mouth.    furosemide (LASIX) 20 MG tablet TAKE 1 TABLET BY MOUTH ONCE DAILY AS NEEDED FOR EDEMA    levETIRAcetam (KEPPRA) 250 MG Tab Take 1 tablet (250 mg total) by mouth nightly. (Patient not taking: Reported on 3/15/2023)    mirtazapine (REMERON) 15 MG tablet Take 15 mg by mouth every evening.    multivitamin capsule Take 1 capsule by mouth once daily.    potassium chloride SA (K-DUR,KLOR-CON M) 10 MEQ tablet Take 10 mEq by mouth daily as needed.    venlafaxine (EFFEXOR-XR) 37.5 MG 24 hr capsule Take 1 capsule (37.5 mg total) by mouth once daily. (Patient not taking: Reported on 3/15/2023)     Family History    None       Tobacco Use    Smoking status: Never    Smokeless tobacco: Never   Substance and Sexual Activity    Alcohol use: Never    Drug use: Never    Sexual activity: Not on file     Review of Systems   Reason unable to perform ROS: Patient  unable to answer questions appropriately.   Objective:     Vital Signs (Most Recent):  Temp: 99.4 °F (37.4 °C) (06/07/23 0002)  Pulse: 88 (06/07/23 0002)  Resp: 18 (06/07/23 0002)  BP: 136/82 (06/07/23 0002)  SpO2: 99 % (06/07/23 0002) Vital Signs (24h Range):  Temp:  [98.6 °F (37 °C)-99.4 °F (37.4 °C)] 99.4 °F (37.4 °C)  Pulse:  [83-88] 88  Resp:  [18-20] 18  SpO2:  [97 %-100 %] 99 %  BP: (131-165)/(73-82) 136/82     Weight: 49.9 kg (110 lb)  Body mass index is 21.48 kg/m².     Physical Exam  Constitutional:       General: She is not in acute distress.     Appearance: Normal appearance. She is not ill-appearing.   HENT:      Head: Normocephalic and atraumatic.      Right Ear: Tympanic membrane normal.      Left Ear: Tympanic membrane normal.   Cardiovascular:      Rate and Rhythm: Normal rate and regular rhythm.      Pulses: Normal pulses.      Heart sounds: Normal heart sounds. No murmur heard.  Pulmonary:      Effort: No respiratory distress.      Breath sounds: No wheezing.   Abdominal:      General: Abdomen is flat. Bowel sounds are normal.      Palpations: Abdomen is soft.   Genitourinary:     Comments: Indwelling arboleda catheter in place with clear urine  Musculoskeletal:         General: No swelling.      Cervical back: Normal range of motion and neck supple.      Comments: R knee in immobilizer   Skin:     General: Skin is warm and dry.      Capillary Refill: Capillary refill takes 2 to 3 seconds.   Neurological:      General: No focal deficit present.      Mental Status: She is alert. She is disoriented.   Psychiatric:         Mood and Affect: Mood normal.              Significant Labs: All pertinent labs within the past 24 hours have been reviewed.  BMP:   Recent Labs   Lab 06/06/23 2254 06/07/23  0058   *  --      --    K 4.5  --      --    CO2 24  --    BUN 15  --    CREATININE 1.33  --    CALCIUM 9.0  --    MG  --  1.6     CBC:   Recent Labs   Lab 06/06/23 2254   WBC 9.19   HGB 8.4*    HCT 28.0*        CMP:   Recent Labs   Lab 06/06/23  2254      K 4.5      CO2 24   *   BUN 15   CREATININE 1.33   CALCIUM 9.0   PROT 8.4   ALBUMIN 4.4   BILITOT 0.4   ALKPHOS 99   AST 22   ALT 14   ANIONGAP 13       Significant Imaging: I have reviewed all pertinent imaging results/findings within the past 24 hours.  I have reviewed and interpreted all pertinent imaging results/findings within the past 24 hours.

## 2023-06-07 NOTE — PLAN OF CARE
Patient admitted this am after fall down stairs when she got confused and found to have periprosthetic distal femur fracture in patient with previous knee replacements. Patient seen by Ortho and they plan to take to OR tomorrow, 6/8 for ORIF of femur. Daughter at bedside and spoke to patient and daughter and daughter aware of plan. Patient very comfortable at present and not complaining of any pain. Daughter states patient is not on any special diet at home. Patient has previous diagnosis of diabetes but family states well controlled and doctors have told her since patient's appetite is not the best she can eat whatever she wants and takes no meds for her diabetes. Patient on Keppra for history seizures and Effexor for depression related to her dementia. Patient on no meds for her HTN per daughter. Patient has underlying CKD but renal function appears at baseline on admit. Patient anemic on admit with Hgb 8.4 and normally Hgb is around 9. Iron studies done and unremarkable. Folate and Vit B12 levels from last year were normal. Hgb 7.7 this am and drop related to femur fracture. Ortho to transfuse 1 unit of PRBCs today in anticipation of surgery tomorrow. Patient with low grade fever this am of 100.6 F. Procal normal. WBC normal. CXR with no acute findings and U/A not consistent with UTI so would just monitor for now. Patient has no signs or symptoms of infection on exam. NPO after midnight tonight.       LARRY DIMAS MD  Attending Staff Physician   Department of Hospital Medicine, [facility] on [facility]  Pager: [phone]  Spectralink: [phone]

## 2023-06-07 NOTE — ASSESSMENT & PLAN NOTE
Orthopedics consulted who plans for operative management  Patient with no history of CVA, ischemic heart disease, or heart failure per her daughters and chart review  See preoperative evaluation

## 2023-06-07 NOTE — ASSESSMENT & PLAN NOTE
Patient cardiac exam unremarkable  No reported hx of ischemic heart disease, heart failure, CVA, patient previously was on insulin but DM has been well controlled lately not requiring insulin  Previous A1c of 5.6 this admission  Based on revised cardiac risk index: Class I risk, 3.9 30 day risk of death, MI, or cardiac arrest

## 2023-06-07 NOTE — ASSESSMENT & PLAN NOTE
Patient is a 83-year-old female who presents with a right distal femur periprosthetic fracture after a ground level fall earlier today.  She ambulates independently in her home but uses a wheelchair for distances due to dementia.  She has a past medical history significant only for dementia.  She takes no anticoagulation at home.  She lives with her daughter.  On exam she has 2+ dorsalis pedis pulses and ABIs >1.  There is no skin tenting around the fracture site and compartments of the right lower extremity are soft and compressible.  She has intact sensation to light touch in the foot.    --admitted to Medicine for preoperative medical optimization  --NPO  --knee immobilizer placed in the right lower extremity  --nonweightbearing right lower extremity  --we will discuss further plan with staff in the morning

## 2023-06-07 NOTE — SUBJECTIVE & OBJECTIVE
Past Medical History:   Diagnosis Date    History of hypertension     always been able to manage with diet    Hx of diabetes mellitus     Always controlled with diet and weight loss    Hyperlipidemia     always been able to manage with diet    Memory loss     Osteoarthritis        Past Surgical History:   Procedure Laterality Date    CHOLECYSTECTOMY      Hysterectomy      KNEE SURGERY Left 2018    knee replacement    KNEE SURGERY Right 2000    knee replacement    OPEN REDUCTION OF FRACTURE OF MANDIBLE Left 1/6/2021    Procedure: OPEN REDUCTION, FRACTURE, MANDIBLE;  Surgeon: Dar Mcgill MD;  Location: [facility] OR [address] [place] [address];  Service: ENT;  Laterality: Left;    REDUCTION OF BOTH BREASTS Bilateral 1990       Review of patient's allergies indicates:  No Known Allergies    Current Facility-Administered Medications   Medication    calcium-vitamin D3 500 mg-5 mcg (200 unit) per tablet 2 tablet     Current Outpatient Medications   Medication Sig    ferrous sulfate 220 mg (44 mg iron)/5 mL solution Take 220 mg by mouth.    furosemide (LASIX) 20 MG tablet TAKE 1 TABLET BY MOUTH ONCE DAILY AS NEEDED FOR EDEMA    levETIRAcetam (KEPPRA) 250 MG Tab Take 1 tablet (250 mg total) by mouth nightly. (Patient not taking: Reported on 3/15/2023)    mirtazapine (REMERON) 15 MG tablet Take 15 mg by mouth every evening.    multivitamin capsule Take 1 capsule by mouth once daily.    potassium chloride SA (K-DUR,KLOR-CON M) 10 MEQ tablet Take 10 mEq by mouth daily as needed.    venlafaxine (EFFEXOR-XR) 37.5 MG 24 hr capsule Take 1 capsule (37.5 mg total) by mouth once daily. (Patient not taking: Reported on 3/15/2023)     Family History    None       Tobacco Use    Smoking status: Never    Smokeless tobacco: Never   Substance and Sexual Activity    Alcohol use: Never    Drug use: Never    Sexual activity: Not on file     ROS  Unable to completely obtain due to dementia    Objective:     Vital Signs (Most Recent):  Temp: 99.4 °F (37.4 °C)  (06/07/23 0002)  Pulse: 88 (06/07/23 0002)  Resp: 18 (06/07/23 0002)  BP: 136/82 (06/07/23 0002)  SpO2: 99 % (06/07/23 0002) Vital Signs (24h Range):  Temp:  [98.6 °F (37 °C)-99.4 °F (37.4 °C)] 99.4 °F (37.4 °C)  Pulse:  [83-88] 88  Resp:  [18-20] 18  SpO2:  [97 %-100 %] 99 %  BP: (131-165)/(73-82) 136/82     Weight: 49.9 kg (110 lb)     Body mass index is 21.48 kg/m².    No intake or output data in the 24 hours ending 06/07/23 0156     Ortho/SPM Exam  General:  no acute distress, appears stated age   Neuro: alert and oriented x3  Psych: normal mood  Head: normocephalic, atraumatic.  Eyes: no scleral icterus  Mouth: moist mucous membranes  Cardiovascular: extremities warm and well perfused  Lungs: breathing comfortably, equal chest rise bilat  Skin: clean, dry, intact (any exceptions noted in below musculoskeletal exam)       Musculoskeletal  Right Upper Extremity     -Skin, Intact : no ecchymosis, lesions, lacerations or abrasions   -Deltoid, biceps, triceps intact   -ROM full range of motion to the shoulder wrist and elbow  -SILT M/R/U/Ax  -Motor intact Ain/PIN/U/Ax  -WWP     Left Upper Extremity     -Skin, Intact : no ecchymosis, lesions, lacerations or abrasions   -Deltoid, biceps, triceps intact   -ROM full range of motion to the shoulder wrist and elbow  -SILT M/R/U/Ax  -Motor intact Ain/PIN/U/Ax  -WWP     Right Lower Extremity Exam     - Skin Intact : no ecchymosis, lesions, lacerations or abrasions, no skin tenting  - ROM range of motion deferred to the right knee due to known fracture, full range of motion to the right ankle, mild pain to palpation around the right hip, no significant pain with minimal log roll of the right leg.  - TA/EHL/Gastroc/FHL intact  - SILT throughout  - warm well-perfused foot with 2+ dorsalis pedis pulses        Left Lower Extremity Exam    - Skin Intact : no ecchymosis, lesions, lacerations or abrasions   - Quad/ Hip flexor intact   - ROM full range of motion to the left hip knee  and ankle  - TA/EHL/Gastroc/FHL intact  - SILT throughout  - 2+ dorsalis pedis pulses    Spine:  No cervical spine tenderness       All joints (shoulder/elbow/wrist/hip/knee/ankle) were examined and had full ROM and were non-tender to palpation except as above          Significant Labs: CBC:   Recent Labs   Lab 06/06/23  2254   WBC 9.19   HGB 8.4*   HCT 28.0*        CMP:   Recent Labs   Lab 06/06/23  2254      K 4.5      CO2 24   *   BUN 15   CREATININE 1.33   CALCIUM 9.0   PROT 8.4   ALBUMIN 4.4   BILITOT 0.4   ALKPHOS 99   AST 22   ALT 14   ANIONGAP 13     All pertinent labs within the past 24 hours have been reviewed.    Significant Imaging: I have reviewed and interpreted all pertinent imaging results/findings.  X-ray of the right knee shows right periprosthetic distal femur fracture, CT knee and pelvis are pending.

## 2023-06-07 NOTE — ED PROVIDER NOTES
Encounter Date: 6/6/2023       History     Chief Complaint   Patient presents with    Transfer    Leg Injury     From [facility], needs ortho for R femur fx     84 yo F transferred for ortho eval of a distal femur Fx, at the site of a prosthetic knee.  Patient had a mechanical slip and fall while going down stairs.  Isolated injury to the knee.  Pain controlled on arrival.  No distress noted otherwise.  Vital signs normal.    Review of patient's allergies indicates:  No Known Allergies  Past Medical History:   Diagnosis Date    History of hypertension     always been able to manage with diet    Hx of diabetes mellitus     Always controlled with diet and weight loss    Hyperlipidemia     always been able to manage with diet    Memory loss     Osteoarthritis      Past Surgical History:   Procedure Laterality Date    CHOLECYSTECTOMY      Hysterectomy      KNEE SURGERY Left 2018    knee replacement    KNEE SURGERY Right 2000    knee replacement    OPEN REDUCTION OF FRACTURE OF MANDIBLE Left 1/6/2021    Procedure: OPEN REDUCTION, FRACTURE, MANDIBLE;  Surgeon: Dar Mcgill MD;  Location: [facility] OR [address] [place] [address];  Service: ENT;  Laterality: Left;    REDUCTION OF BOTH BREASTS Bilateral 1990     No family history on file.  Social History     Tobacco Use    Smoking status: Never    Smokeless tobacco: Never   Substance Use Topics    Alcohol use: Never    Drug use: Never     Review of Systems   Constitutional:  Negative for fever.   HENT:  Negative for sore throat.    Respiratory:  Negative for shortness of breath.    Cardiovascular:  Negative for chest pain.   Gastrointestinal:  Negative for nausea.   Genitourinary:  Negative for dysuria.   Musculoskeletal:  Positive for arthralgias. Negative for back pain.   Skin:  Negative for rash.   Neurological:  Negative for weakness.   Hematological:  Does not bruise/bleed easily.     Physical Exam     Initial Vitals [06/07/23 0002]   BP Pulse Resp Temp SpO2   136/82 88 18 99.4 °F (37.4 °C)  99 %      MAP       --         Physical Exam    Constitutional: Vital signs are normal. She appears well-developed and well-nourished.   HENT:   Head: Normocephalic and atraumatic.   Right Ear: Hearing normal.   Left Ear: Hearing normal.   Eyes: Conjunctivae are normal.   Cardiovascular:  Normal rate and regular rhythm.           Abdominal: Abdomen is soft. Bowel sounds are normal.   Musculoskeletal:         General: Normal range of motion.      Comments: Significant swelling noted around the knee.  No open injury.  Good distal pulses.     Neurological: She is alert and oriented to person, place, and time.   Skin: Skin is warm and intact.   Psychiatric: She has a normal mood and affect. Her speech is normal and behavior is normal. Cognition and memory are normal.       ED Course   Procedures  Labs Reviewed   TRANSFERRIN   MAGNESIUM   PHOSPHORUS   HEMOGLOBIN A1C   HIV 1 / 2 ANTIBODY   HEPATITIS C ANTIBODY   PREALBUMIN   PROCALCITONIN   URINALYSIS, REFLEX TO URINE CULTURE   VITAMIN D          Imaging Results    None          Medications   calcium-vitamin D3 500 mg-5 mcg (200 unit) per tablet 2 tablet (has no administration in time range)     Medical Decision Making:   History:   Old Medical Records: I decided to obtain old medical records.  Old Records Summarized: records from clinic visits.  Initial Assessment:   83-year-old female presenting as a transfer for orthopedic surgery consultation  Differential Diagnosis:   Fracture, contusion, dislocation, vascular injury  Clinical Tests:   Lab Tests: Ordered and Reviewed  Radiological Study: Ordered and Reviewed  Medical Tests: Ordered and Reviewed  ED Management:  Plan:  Case discussed with orthopedics.  Will get routine labs going.  Additional x-rays.  Check COMPA and place a knee immobilizer  Patient will require admission and surgery.  Case discussed with orthopedics and with Hospital Medicine                        Clinical Impression:   Final diagnoses:  [S74.553A]  Closed fracture of right distal femur (Primary)        ED Disposition Condition    Admit Stable                Dar Rivera PA-C  06/07/23 0137

## 2023-06-07 NOTE — HPI
Patient is an 83-year-old female who suffered a ground level fall earlier today with immediate pain inability to bear weight on her right leg.  She presented to an outside emergency room where x-rays showed a right periprosthetic distal femur fracture around a prior total knee arthroplasty.  She is unsure who the original surgeon for this total knee replacement was.  She has a past medical history significant for dementia but is otherwise healthy.  She takes no anticoagulation at home.  She uses a wheelchair for distances due to dementia but ambulates independently in her home.  She lives with her daughter.

## 2023-06-07 NOTE — PLAN OF CARE
[facility] - [facility]  Initial Discharge Assessment       Primary Care Provider: Josh Fajardo MD    Admission Diagnosis: Closed fracture of right distal femur [S72.401A]  Chest pain [R07.9]    Admission Date: 6/7/2023  Expected Discharge Date: 6/12/2023         Payor: [employer] MEDICARE / Plan: [employer] 65 / Product Type: Medicare Advantage /     Extended Emergency Contact Information  Primary Emergency Contact: Alyssa Hawk  Mobile Phone: [phone]  Relation: Daughter  Preferred language: English   needed? No  Secondary Emergency Contact: Ajit Aponte  Mobile Phone: [phone]  Relation: Son  Preferred language: English   needed? No  Guardian: arpan yanes  Home Phone: [phone]  Relation: Daughter    Discharge Plan A: Home Health, Home with family         [facility] [place] - [address]  [address]  [place] [address]  Phone: [phone] Fax: [phone]      Initial Assessment (most recent)       Adult Discharge Assessment - 06/07/23 1329          Discharge Assessment    Assessment Type Discharge Planning Assessment     Confirmed/corrected address, phone number and insurance Yes     Confirmed Demographics Correct on Facesheet     Source of Information family     If unable to respond/provide information was family/caregiver contacted? Yes     Contact Name/Number Alyssa Hawk (Daughter) [phone]     Does patient/caregiver understand observation status Yes     Communicated EDDIE with patient/caregiver Yes     People in Home child(aida), adult     Do you expect to return to your current living situation? Yes     Do you have help at home or someone to help you manage your care at home? Yes     Who are your caregiver(s) and their phone number(s)? Alyssa Hawk (Daughter) [phone]     Prior to hospitilization cognitive status: Not Oriented to Place;Not Oriented to Time     Current cognitive status: Not  Oriented to Place;Not Oriented to Time     Walking or Climbing Stairs ambulation difficulty, requires equipment     Mobility Management cane, walker, rollator, wheelchair     Dressing/Bathing bathing difficulty, requires equipment     Dressing/Bathing Management shower chair     Home Layout Able to live on 1st floor     Equipment Currently Used at Home cane, straight;walker, rolling;rollator;shower chair;wheelchair     Readmission within 30 days? No     Patient currently being followed by outpatient case management? No     Do you currently have service(s) that help you manage your care at home? No     Do you take prescription medications? Yes     Do you have prescription coverage? Yes     Do you have any problems affording any of your prescribed medications? No     Is the patient taking medications as prescribed? yes     Who is going to help you get home at discharge? family and home health care     How do you get to doctors appointments? family or friend will provide     Are you on dialysis? No     Do you take coumadin? No     Discharge Plan A Home Health;Home with family        Physical Activity    On average, how many days per week do you engage in moderate to strenuous exercise (like a brisk walk)? 0 days     On average, how many minutes do you engage in exercise at this level? 0 min        Financial Resource Strain    How hard is it for you to pay for the very basics like food, housing, medical care, and heating? Not hard at all        Housing Stability    In the last 12 months, was there a time when you were not able to pay the mortgage or rent on time? No     In the last 12 months, how many places have you lived? 1     In the last 12 months, was there a time when you did not have a steady place to sleep or slept in a shelter (including now)? No        Transportation Needs    In the past 12 months, has lack of transportation kept you from medical appointments or from getting medications? No     In the past 12  months, has lack of transportation kept you from meetings, work, or from getting things needed for daily living? No        Food Insecurity    Within the past 12 months, you worried that your food would run out before you got the money to buy more. Never true     Within the past 12 months, the food you bought just didn't last and you didn't have money to get more. Never true        Stress    Do you feel stress - tense, restless, nervous, or anxious, or unable to sleep at night because your mind is troubled all the time - these days? Not at all        Social Connections    In a typical week, how many times do you talk on the phone with family, friends, or neighbors? More than three times a week     How often do you get together with friends or relatives? More than three times a week     How often do you attend Yarsanism or Mandaeism services? More than 4 times per year     Do you belong to any clubs or organizations such as Yarsanism groups, unions, fraternal or athletic groups, or school groups? Yes     How often do you attend meetings of the clubs or organizations you belong to? More than 4 times per year     Are you , , , , never , or living with a partner?         Alcohol Use    Q1: How often do you have a drink containing alcohol? Never     Q2: How many drinks containing alcohol do you have on a typical day when you are drinking? Patient does not drink     Q3: How often do you have six or more drinks on one occasion? Never                      Spoke with patient's daughter Alyssa at bedside to complete d/c planning assessment. Patient lives at home and her daughter Dionna lives there with here. Home is single story without steps to enter. Independent prior to fall with a rollator, rolling walker, cane, shower chair and wheelchair. Patient has 7 living children all of whom are very involved with her care. Plan for ORIF on 6/8/23. PT/OT eval to take place on 6/9/23. Family  adamant that at discharge patient will return home to care of family and HH services due to her history of Dementia. SW to arrange HH care for d/c. Continue to follow for needs. EDDIE 6/12/23 at this time.      Citlali [facility]  Case Management  [facility]  [phone]

## 2023-06-07 NOTE — ED PROVIDER NOTES
"Encounter Date: 6/6/2023       History     Chief Complaint   Patient presents with    Fall     Pt arrives with complaints of right leg pain that occurred after falling down a few stairs tonight. Pt daughter states that pt has sun downers and was "seeing ghosts", leading her to go walk down the stairs. Pt denies hitting her head and losing consciousness. No other pain reported. Shortening to right leg noted.      HPI  83 y.o.   H/o dementia, s/p mechanical fall just PTA   Co knee and hip pain   No HS, LOC neck nor back pain  Worse with attempted movement, wasn't able to walk  Mod   Nr    Review of patient's allergies indicates:  No Known Allergies  Past Medical History:   Diagnosis Date    History of hypertension     always been able to manage with diet    Hx of diabetes mellitus     Always controlled with diet and weight loss    Hyperlipidemia     always been able to manage with diet    Memory loss     Osteoarthritis      Past Surgical History:   Procedure Laterality Date    CHOLECYSTECTOMY      Hysterectomy      KNEE SURGERY Left 2018    knee replacement    KNEE SURGERY Right 2000    knee replacement    OPEN REDUCTION OF FRACTURE OF MANDIBLE Left 1/6/2021    Procedure: OPEN REDUCTION, FRACTURE, MANDIBLE;  Surgeon: Dar Mcgill MD;  Location: [facility] OR [address] [place] [address];  Service: ENT;  Laterality: Left;    REDUCTION OF BOTH BREASTS Bilateral 1990     No family history on file.  Social History     Tobacco Use    Smoking status: Never    Smokeless tobacco: Never   Substance Use Topics    Alcohol use: Never    Drug use: Never     Review of Systems  All systems were reviewed/examined and were negative except as noted in the HPI.    Physical Exam     Initial Vitals [06/06/23 2115]   BP Pulse Resp Temp SpO2   (!) 165/73 83 20 98.6 °F (37 °C) 100 %      MAP       --         Physical Exam    General: the patient is awake, alert, and in no apparent distress.  Head: normocephalic and atraumatic, sclera are clear  Neck: supple without " meningismus nt  Chest:no respiratory distress  Heart: regular rate and rhythm  ABD soft, nontender, nondistended, no peritoneal signs  Back nt in the midline  Extremities: warm and well perfused     R knee mild tenderness, no obvious deformity; can't range     R hip mild tenderness    RLE is shortened and rotated, pulses intact, SILT intact  Skin: warm and dry  Psych conversant  Neuro: awake, alert, moving all extremities    ED Course   Procedures  Labs Reviewed   CBC W/ AUTO DIFFERENTIAL - Abnormal; Notable for the following components:       Result Value    RBC 3.28 (*)     Hemoglobin 8.4 (*)     Hematocrit 28.0 (*)     MCH 25.6 (*)     MCHC 30.0 (*)     RDW 15.1 (*)     Gran # (ANC) 7.8 (*)     Lymph # 0.9 (*)     Gran % 84.8 (*)     Lymph % 9.8 (*)     All other components within normal limits   COMPREHENSIVE METABOLIC PANEL - Abnormal; Notable for the following components:    Glucose 138 (*)     eGFR 39.9 (*)     All other components within normal limits   PROTIME-INR          Imaging Results              X-Ray Chest AP Portable (Final result)  Result time 06/06/23 23:09:08      Final result by Justin Mcgraw MD (06/06/23 23:09:08)                   Impression:      As above      Electronically signed by: Justin Mcgraw  Date:    06/06/2023  Time:    23:09               Narrative:    EXAMINATION:  XR CHEST AP PORTABLE    CLINICAL HISTORY:  Unspecified fall, initial encounter    TECHNIQUE:  Single frontal view of the chest was performed.    COMPARISON:  None    FINDINGS:  Cardiomegaly.  Minimal perihilar interstitial opacities.  Senescent changes.  Similar findings to prior exam    Bones are intact.                                       X-Ray Hip 2 or 3 views Right (with Pelvis when performed) (Final result)  Result time 06/06/23 23:11:23      Final result by Justin Mcgraw MD (06/06/23 23:11:23)                   Impression:      As above      Electronically signed by: Justin  Lucien  Date:    06/06/2023  Time:    23:11               Narrative:    EXAMINATION:  XR HIP WITH PELVIS WHEN PERFORMED, 2 OR 3  VIEWS RIGHT    CLINICAL HISTORY:  XR HIP WITH PELVIS WHEN PERFORMED, 2 OR 3  VIEWS RIGHTPain in right hip    COMPARISON:  None    FINDINGS:  Multiple radiographic views  were obtained.    Decreased bone mineral density.  Irregularity of the inferior pubic rami and ischium on the right of uncertain chronicity.  No definite right hip fracture identified.                                       X-Ray Knee 1 or 2 View Right (Final result)  Result time 06/06/23 23:08:28   Procedure changed from X-Ray Knee 3 View Right     Final result by Justin Mcgraw MD (06/06/23 23:08:28)                   Impression:      As above      Electronically signed by: Justin Mcgraw  Date:    06/06/2023  Time:    23:08               Narrative:    EXAMINATION:  XR KNEE 1 OR 2 VIEW RIGHT    CLINICAL HISTORY:  Richt knee pain; Pain in right knee    TECHNIQUE:  AP, lateral, and Merchant views of the right knee were performed.    COMPARISON:  None    FINDINGS:  Fracture of the distal femur with 1 full thickness anterior displacement at the fracture site.  Right knee prosthesis in place.  Heme arthrosis and decreased bone mineral density.                                       Medications   morphine injection 4 mg (4 mg Intravenous Given 6/6/23 2151)   ondansetron injection 4 mg (4 mg Intravenous Given 6/6/23 2150)         Medical Decision Making:    This is an emergent evaluation of a patient presenting to the ED.  Nursing notes were reviewed.  I personally reviewed, read, and interpreted the ECG and any monitoring strips.  ECG: normal EKG, normal sinus rhythm, unchanged from previous tracings Compared with prior if available.  Read and interpreted by me independently.      I reviewed radiology images personally along with interpretations.  R knee xr c/w distal femur fx with 100% distraction above HW    Labs non  critical  I personally reviewed and interpreted the laboratory results.  Communicated with another physician regarding patient's care: ortho [facility]  I decided to obtain and review old medical records, which showed: well care    Analgesics  [facility] on diversion    Ti Kolb MD, HANNAH                       Clinical Impression:   Final diagnoses:  [M25.561] Right knee pain  [M25.551] Right hip pain  [W19.XXXA] Fall  [Z01.818] Pre-op evaluation  [S72.401A] Closed fracture of distal end of right femur, unspecified fracture morphology, initial encounter (Primary)        ED Disposition Condition    Transfer to Another Facility Stable             Ti Kolb MD, HANNAH, [facility]  Department of Emergency Medicine       Josh Kolb MD  06/07/23 0153

## 2023-06-07 NOTE — H&P
[facility] Medicine  History & Physical    Patient Name: Ally Steiner  MRN: [id number]  Patient Class: IP- Inpatient  Admission Date: 6/7/2023  Attending Physician: Maria Guadalupe Fang MD   Primary Care Provider: Josh Fjaardo MD         Patient information was obtained from relative(s) and ER records.     Subjective:     Principal Problem:Periprosthetic fracture around internal prosthetic joint    Chief Complaint:   Chief Complaint   Patient presents with    Transfer    Leg Injury     From [facility], needs ortho for R femur fx        HPI: Patient is a pleasant 83 year old AA female with a past medical history of DM2, dementia, hyperlipidemia, and hx of knee replacements bilaterally.  She is very demented and history is obtained via both of her daughters who are present at bedside.  She presented to [facility] ED with inability to bear weight after a mechanical fall down the stairs.  Denies LOC, but patient has had episodes of sundowning prompting her to try to walk down the stairs.  Otherwise she usually is able to walk down the stairs without issue and is able to walk long distances without limitation at her baseline.  She denies any head trauma.        At the outside ED, vital signs were stable, labs unremarkable.  XR of the femur shows periprosthetic R sided femoral fracture, she was transferred here for orthopedic evaluation, who recommended urgent surgical repair of her femur.  Hospital medicine consulted for preoperative evaluation.      Past Medical History:   Diagnosis Date    History of hypertension     always been able to manage with diet    Hx of diabetes mellitus     Always controlled with diet and weight loss    Hyperlipidemia     always been able to manage with diet    Memory loss     Osteoarthritis        Past Surgical History:   Procedure Laterality Date    CHOLECYSTECTOMY      Hysterectomy      KNEE SURGERY Left 2018    knee replacement    KNEE  SURGERY Right 2000    knee replacement    OPEN REDUCTION OF FRACTURE OF MANDIBLE Left 1/6/2021    Procedure: OPEN REDUCTION, FRACTURE, MANDIBLE;  Surgeon: Dar Mcgill MD;  Location: [facility] OR [address] [place] [address];  Service: ENT;  Laterality: Left;    REDUCTION OF BOTH BREASTS Bilateral 1990       Review of patient's allergies indicates:  No Known Allergies    Current Facility-Administered Medications on File Prior to Encounter   Medication    [COMPLETED] morphine injection 4 mg    [COMPLETED] ondansetron injection 4 mg     Current Outpatient Medications on File Prior to Encounter   Medication Sig    ferrous sulfate 220 mg (44 mg iron)/5 mL solution Take 220 mg by mouth.    furosemide (LASIX) 20 MG tablet TAKE 1 TABLET BY MOUTH ONCE DAILY AS NEEDED FOR EDEMA    levETIRAcetam (KEPPRA) 250 MG Tab Take 1 tablet (250 mg total) by mouth nightly. (Patient not taking: Reported on 3/15/2023)    mirtazapine (REMERON) 15 MG tablet Take 15 mg by mouth every evening.    multivitamin capsule Take 1 capsule by mouth once daily.    potassium chloride SA (K-DUR,KLOR-CON M) 10 MEQ tablet Take 10 mEq by mouth daily as needed.    venlafaxine (EFFEXOR-XR) 37.5 MG 24 hr capsule Take 1 capsule (37.5 mg total) by mouth once daily. (Patient not taking: Reported on 3/15/2023)     Family History    None       Tobacco Use    Smoking status: Never    Smokeless tobacco: Never   Substance and Sexual Activity    Alcohol use: Never    Drug use: Never    Sexual activity: Not on file     Review of Systems   Reason unable to perform ROS: Patient unable to answer questions appropriately.   Objective:     Vital Signs (Most Recent):  Temp: 99.4 °F (37.4 °C) (06/07/23 0002)  Pulse: 88 (06/07/23 0002)  Resp: 18 (06/07/23 0002)  BP: 136/82 (06/07/23 0002)  SpO2: 99 % (06/07/23 0002) Vital Signs (24h Range):  Temp:  [98.6 °F (37 °C)-99.4 °F (37.4 °C)] 99.4 °F (37.4 °C)  Pulse:  [83-88] 88  Resp:  [18-20] 18  SpO2:  [97 %-100 %] 99 %  BP: (131-165)/(73-82)  136/82     Weight: 49.9 kg (110 lb)  Body mass index is 21.48 kg/m².     Physical Exam  Constitutional:       General: She is not in acute distress.     Appearance: Normal appearance. She is not ill-appearing.   HENT:      Head: Normocephalic and atraumatic.      Right Ear: Tympanic membrane normal.      Left Ear: Tympanic membrane normal.   Cardiovascular:      Rate and Rhythm: Normal rate and regular rhythm.      Pulses: Normal pulses.      Heart sounds: Normal heart sounds. No murmur heard.  Pulmonary:      Effort: No respiratory distress.      Breath sounds: No wheezing.   Abdominal:      General: Abdomen is flat. Bowel sounds are normal.      Palpations: Abdomen is soft.   Genitourinary:     Comments: Indwelling arboleda catheter in place with clear urine  Musculoskeletal:         General: No swelling.      Cervical back: Normal range of motion and neck supple.      Comments: R knee in immobilizer   Skin:     General: Skin is warm and dry.      Capillary Refill: Capillary refill takes 2 to 3 seconds.   Neurological:      General: No focal deficit present.      Mental Status: She is alert. She is disoriented.   Psychiatric:         Mood and Affect: Mood normal.              Significant Labs: All pertinent labs within the past 24 hours have been reviewed.  BMP:   Recent Labs   Lab 06/06/23 2254 06/07/23  0058   *  --      --    K 4.5  --      --    CO2 24  --    BUN 15  --    CREATININE 1.33  --    CALCIUM 9.0  --    MG  --  1.6     CBC:   Recent Labs   Lab 06/06/23  2254   WBC 9.19   HGB 8.4*   HCT 28.0*        CMP:   Recent Labs   Lab 06/06/23  2254      K 4.5      CO2 24   *   BUN 15   CREATININE 1.33   CALCIUM 9.0   PROT 8.4   ALBUMIN 4.4   BILITOT 0.4   ALKPHOS 99   AST 22   ALT 14   ANIONGAP 13       Significant Imaging: I have reviewed all pertinent imaging results/findings within the past 24 hours.  I have reviewed and interpreted all pertinent imaging  results/findings within the past 24 hours.    Assessment/Plan:     * Periprosthetic fracture of the right distal femur  Orthopedics consulted who plans for operative management  Patient with no history of CVA, ischemic heart disease, or heart failure per her daughters and chart review  See preoperative evaluation      Preop cardiovascular exam  Patient cardiac exam unremarkable  No reported hx of ischemic heart disease, heart failure, CVA, patient previously was on insulin but DM has been well controlled lately not requiring insulin  Previous A1c of 5.6 this admission  Based on revised cardiac risk index: Class I risk, 3.9 30 day risk of death, MI, or cardiac arrest           VTE Risk Mitigation (From admission, onward)         Ordered     Place sequential compression device  Until discontinued         06/07/23 0239     IP VTE HIGH RISK PATIENT  Once         06/07/23 0239                           Carlos Bess MD  Department of Hospital Medicine  [facility] - Surgery

## 2023-06-07 NOTE — CARE UPDATE
Discussed with staff, due to OR availability plan for surgery tomorrow. Updated Dr. Fang and nurse Nguyễn who wiill update family. Okay for diet today, NPO midnight. Rec starting subq heparin TID for dvt ppx

## 2023-06-07 NOTE — HPI
Patient is a pleasant 83 year old AA female with a past medical history of DM2, dementia, hyperlipidemia, and hx of knee replacements bilaterally.  She is very demented and history is obtained via both of her daughters who are present at bedside.  She presented to [facility] ED with inability to bear weight after a mechanical fall down the stairs.  Denies LOC, but patient has had episodes of sundowning prompting her to try to walk down the stairs.  Otherwise she usually is able to walk down the stairs without issue and is able to walk long distances without limitation at her baseline.  She denies any head trauma.        At the outside ED, vital signs were stable, labs unremarkable.  XR of the femur shows periprosthetic R sided femoral fracture, she was transferred here for orthopedic evaluation, who recommended urgent surgical repair of her femur.  Hospital medicine consulted for preoperative evaluation.

## 2023-06-07 NOTE — CARE UPDATE
Ortho update  Hb 7.7 this AM, will transfuse 1u prbc now as anticipating continued blood loss from femur fracture until surgery and during surgery   Rocephin ordered bc UA concerning for UTI in setting of fever this AM

## 2023-06-08 ENCOUNTER — ANESTHESIA (OUTPATIENT)
Dept: SURGERY | Facility: HOSPITAL | Age: 83
DRG: 481 | End: 2023-06-08
Payer: MEDICARE

## 2023-06-08 PROBLEM — D62 ACUTE BLOOD LOSS ANEMIA: Status: ACTIVE | Noted: 2023-06-08

## 2023-06-08 PROBLEM — R50.9 FEVER: Status: ACTIVE | Noted: 2023-06-08

## 2023-06-08 PROBLEM — Z01.810 PREOP CARDIOVASCULAR EXAM: Status: RESOLVED | Noted: 2023-06-07 | Resolved: 2023-06-08

## 2023-06-08 PROBLEM — D50.9 IRON DEFICIENCY ANEMIA: Status: ACTIVE | Noted: 2023-06-08

## 2023-06-08 LAB
ALBUMIN SERPL BCP-MCNC: 3.1 G/DL (ref 3.5–5.2)
ALP SERPL-CCNC: 102 U/L (ref 55–135)
ALT SERPL W/O P-5'-P-CCNC: 8 U/L (ref 10–44)
ANION GAP SERPL CALC-SCNC: 9 MMOL/L (ref 8–16)
AST SERPL-CCNC: 15 U/L (ref 10–40)
BASOPHILS # BLD AUTO: 0.03 K/UL (ref 0–0.2)
BASOPHILS NFR BLD: 0.4 % (ref 0–1.9)
BILIRUB SERPL-MCNC: 0.9 MG/DL (ref 0.1–1)
BUN SERPL-MCNC: 16 MG/DL (ref 8–23)
CALCIUM SERPL-MCNC: 9.1 MG/DL (ref 8.7–10.5)
CHLORIDE SERPL-SCNC: 101 MMOL/L (ref 95–110)
CO2 SERPL-SCNC: 24 MMOL/L (ref 23–29)
CREAT SERPL-MCNC: 1.1 MG/DL (ref 0.5–1.4)
CRP SERPL-MCNC: 108.3 MG/L (ref 0–8.2)
DIFFERENTIAL METHOD: ABNORMAL
EOSINOPHIL # BLD AUTO: 0.3 K/UL (ref 0–0.5)
EOSINOPHIL NFR BLD: 3.8 % (ref 0–8)
ERYTHROCYTE [DISTWIDTH] IN BLOOD BY AUTOMATED COUNT: 14.7 % (ref 11.5–14.5)
EST. GFR  (NO RACE VARIABLE): 50.2 ML/MIN/1.73 M^2
GLUCOSE SERPL-MCNC: 122 MG/DL (ref 70–110)
HCT VFR BLD AUTO: 29.8 % (ref 37–48.5)
HGB BLD-MCNC: 9.6 G/DL (ref 12–16)
IMM GRANULOCYTES # BLD AUTO: 0.05 K/UL (ref 0–0.04)
IMM GRANULOCYTES NFR BLD AUTO: 0.6 % (ref 0–0.5)
LYMPHOCYTES # BLD AUTO: 1.3 K/UL (ref 1–4.8)
LYMPHOCYTES NFR BLD: 16.8 % (ref 18–48)
MAGNESIUM SERPL-MCNC: 1.9 MG/DL (ref 1.6–2.6)
MCH RBC QN AUTO: 26.5 PG (ref 27–31)
MCHC RBC AUTO-ENTMCNC: 32.2 G/DL (ref 32–36)
MCV RBC AUTO: 82 FL (ref 82–98)
MONOCYTES # BLD AUTO: 0.7 K/UL (ref 0.3–1)
MONOCYTES NFR BLD: 8.4 % (ref 4–15)
NEUTROPHILS # BLD AUTO: 5.6 K/UL (ref 1.8–7.7)
NEUTROPHILS NFR BLD: 70 % (ref 38–73)
NRBC BLD-RTO: 0 /100 WBC
PHOSPHATE SERPL-MCNC: 2.6 MG/DL (ref 2.7–4.5)
PLATELET # BLD AUTO: 202 K/UL (ref 150–450)
PMV BLD AUTO: 11.7 FL (ref 9.2–12.9)
POTASSIUM SERPL-SCNC: 4.1 MMOL/L (ref 3.5–5.1)
PROT SERPL-MCNC: 7.2 G/DL (ref 6–8.4)
RBC # BLD AUTO: 3.62 M/UL (ref 4–5.4)
SODIUM SERPL-SCNC: 134 MMOL/L (ref 136–145)
WBC # BLD AUTO: 7.93 K/UL (ref 3.9–12.7)

## 2023-06-08 PROCEDURE — 94761 N-INVAS EAR/PLS OXIMETRY MLT: CPT

## 2023-06-08 PROCEDURE — D9220A PRA ANESTHESIA: ICD-10-PCS | Mod: CRNA,,, | Performed by: REGISTERED NURSE

## 2023-06-08 PROCEDURE — 27506 PR OPEN RX FEMUR FX+INTRAMED ROD: ICD-10-PCS | Mod: 22,RT,, | Performed by: ORTHOPAEDIC SURGERY

## 2023-06-08 PROCEDURE — 25000003 PHARM REV CODE 250: Performed by: STUDENT IN AN ORGANIZED HEALTH CARE EDUCATION/TRAINING PROGRAM

## 2023-06-08 PROCEDURE — 76942 PR U/S GUIDANCE FOR NEEDLE GUIDANCE: ICD-10-PCS | Mod: 26,,, | Performed by: ANESTHESIOLOGY

## 2023-06-08 PROCEDURE — 36415 COLL VENOUS BLD VENIPUNCTURE: CPT | Performed by: HOSPITALIST

## 2023-06-08 PROCEDURE — 25000003 PHARM REV CODE 250: Performed by: REGISTERED NURSE

## 2023-06-08 PROCEDURE — 80053 COMPREHEN METABOLIC PANEL: CPT | Performed by: STUDENT IN AN ORGANIZED HEALTH CARE EDUCATION/TRAINING PROGRAM

## 2023-06-08 PROCEDURE — 63600175 PHARM REV CODE 636 W HCPCS: Performed by: ANESTHESIOLOGY

## 2023-06-08 PROCEDURE — 99233 PR SUBSEQUENT HOSPITAL CARE,LEVL III: ICD-10-PCS | Mod: ,,, | Performed by: HOSPITALIST

## 2023-06-08 PROCEDURE — 99233 SBSQ HOSP IP/OBS HIGH 50: CPT | Mod: 57,GC,, | Performed by: ORTHOPAEDIC SURGERY

## 2023-06-08 PROCEDURE — 85025 COMPLETE CBC W/AUTO DIFF WBC: CPT | Performed by: STUDENT IN AN ORGANIZED HEALTH CARE EDUCATION/TRAINING PROGRAM

## 2023-06-08 PROCEDURE — 63600175 PHARM REV CODE 636 W HCPCS: Performed by: STUDENT IN AN ORGANIZED HEALTH CARE EDUCATION/TRAINING PROGRAM

## 2023-06-08 PROCEDURE — 86140 C-REACTIVE PROTEIN: CPT | Performed by: HOSPITALIST

## 2023-06-08 PROCEDURE — D9220A PRA ANESTHESIA: Mod: ANES,,, | Performed by: ANESTHESIOLOGY

## 2023-06-08 PROCEDURE — 64999 UNLISTED PX NERVOUS SYSTEM: CPT | Mod: ,,, | Performed by: ANESTHESIOLOGY

## 2023-06-08 PROCEDURE — 63600175 PHARM REV CODE 636 W HCPCS

## 2023-06-08 PROCEDURE — 36000711: Performed by: ORTHOPAEDIC SURGERY

## 2023-06-08 PROCEDURE — 11000001 HC ACUTE MED/SURG PRIVATE ROOM

## 2023-06-08 PROCEDURE — C1713 ANCHOR/SCREW BN/BN,TIS/BN: HCPCS | Performed by: ORTHOPAEDIC SURGERY

## 2023-06-08 PROCEDURE — 63600175 PHARM REV CODE 636 W HCPCS: Performed by: ORTHOPAEDIC SURGERY

## 2023-06-08 PROCEDURE — 64999 RIGHT SIFI CATHETER: ICD-10-PCS | Mod: ,,, | Performed by: ANESTHESIOLOGY

## 2023-06-08 PROCEDURE — 63600175 PHARM REV CODE 636 W HCPCS: Performed by: REGISTERED NURSE

## 2023-06-08 PROCEDURE — 71000015 HC POSTOP RECOV 1ST HR: Performed by: ORTHOPAEDIC SURGERY

## 2023-06-08 PROCEDURE — D9220A PRA ANESTHESIA: Mod: CRNA,,, | Performed by: REGISTERED NURSE

## 2023-06-08 PROCEDURE — 25000003 PHARM REV CODE 250: Performed by: HOSPITALIST

## 2023-06-08 PROCEDURE — D9220A PRA ANESTHESIA: ICD-10-PCS | Mod: ANES,,, | Performed by: ANESTHESIOLOGY

## 2023-06-08 PROCEDURE — C1769 GUIDE WIRE: HCPCS | Performed by: ORTHOPAEDIC SURGERY

## 2023-06-08 PROCEDURE — 64448 NJX AA&/STRD FEM NRV NFS IMG: CPT

## 2023-06-08 PROCEDURE — 27000221 HC OXYGEN, UP TO 24 HOURS

## 2023-06-08 PROCEDURE — 25000003 PHARM REV CODE 250: Performed by: INTERNAL MEDICINE

## 2023-06-08 PROCEDURE — 99900035 HC TECH TIME PER 15 MIN (STAT)

## 2023-06-08 PROCEDURE — 36415 COLL VENOUS BLD VENIPUNCTURE: CPT | Performed by: STUDENT IN AN ORGANIZED HEALTH CARE EDUCATION/TRAINING PROGRAM

## 2023-06-08 PROCEDURE — 37000008 HC ANESTHESIA 1ST 15 MINUTES: Performed by: ORTHOPAEDIC SURGERY

## 2023-06-08 PROCEDURE — 84100 ASSAY OF PHOSPHORUS: CPT | Performed by: STUDENT IN AN ORGANIZED HEALTH CARE EDUCATION/TRAINING PROGRAM

## 2023-06-08 PROCEDURE — 27201423 OPTIME MED/SURG SUP & DEVICES STERILE SUPPLY: Performed by: ORTHOPAEDIC SURGERY

## 2023-06-08 PROCEDURE — 99233 PR SUBSEQUENT HOSPITAL CARE,LEVL III: ICD-10-PCS | Mod: 57,GC,, | Performed by: ORTHOPAEDIC SURGERY

## 2023-06-08 PROCEDURE — 83735 ASSAY OF MAGNESIUM: CPT | Performed by: STUDENT IN AN ORGANIZED HEALTH CARE EDUCATION/TRAINING PROGRAM

## 2023-06-08 PROCEDURE — 99233 SBSQ HOSP IP/OBS HIGH 50: CPT | Mod: ,,, | Performed by: HOSPITALIST

## 2023-06-08 PROCEDURE — 36000710: Performed by: ORTHOPAEDIC SURGERY

## 2023-06-08 PROCEDURE — 71000033 HC RECOVERY, INTIAL HOUR: Performed by: ORTHOPAEDIC SURGERY

## 2023-06-08 PROCEDURE — 76942 ECHO GUIDE FOR BIOPSY: CPT | Mod: 26,,, | Performed by: ANESTHESIOLOGY

## 2023-06-08 PROCEDURE — 37000009 HC ANESTHESIA EA ADD 15 MINS: Performed by: ORTHOPAEDIC SURGERY

## 2023-06-08 PROCEDURE — 27506 TREATMENT OF THIGH FRACTURE: CPT | Mod: 22,RT,, | Performed by: ORTHOPAEDIC SURGERY

## 2023-06-08 DEVICE — SCREW STRDRV VA LOK 3.5X70MM: Type: IMPLANTABLE DEVICE | Site: FEMUR | Status: FUNCTIONAL

## 2023-06-08 DEVICE — WASHER ORTH 5 DEG RIGHT RFNA: Type: IMPLANTABLE DEVICE | Site: FEMUR | Status: FUNCTIONAL

## 2023-06-08 DEVICE — SCREW OPTILINK T25 5.0X80MM: Type: IMPLANTABLE DEVICE | Site: FEMUR | Status: FUNCTIONAL

## 2023-06-08 DEVICE — SCREW BONE VA LK 3.5X75MM: Type: IMPLANTABLE DEVICE | Site: FEMUR | Status: FUNCTIONAL

## 2023-06-08 RX ORDER — MUPIROCIN 20 MG/G
OINTMENT TOPICAL
Status: DISCONTINUED | OUTPATIENT
Start: 2023-06-08 | End: 2023-06-08 | Stop reason: HOSPADM

## 2023-06-08 RX ORDER — EPINEPHRINE 1 MG/ML
INJECTION, SOLUTION, CONCENTRATE INTRAVENOUS
Status: DISPENSED
Start: 2023-06-08 | End: 2023-06-08

## 2023-06-08 RX ORDER — MIDAZOLAM HYDROCHLORIDE 1 MG/ML
.5-4 INJECTION INTRAMUSCULAR; INTRAVENOUS
Status: DISCONTINUED | OUTPATIENT
Start: 2023-06-08 | End: 2023-06-08

## 2023-06-08 RX ORDER — LIDOCAINE HYDROCHLORIDE 20 MG/ML
INJECTION INTRAVENOUS
Status: DISCONTINUED | OUTPATIENT
Start: 2023-06-08 | End: 2023-06-08

## 2023-06-08 RX ORDER — ROPIVACAINE HYDROCHLORIDE 2 MG/ML
0.1 INJECTION, SOLUTION EPIDURAL; INFILTRATION CONTINUOUS
Status: DISCONTINUED | OUTPATIENT
Start: 2023-06-08 | End: 2023-06-12

## 2023-06-08 RX ORDER — AMOXICILLIN 250 MG
1 CAPSULE ORAL DAILY PRN
Status: DISCONTINUED | OUTPATIENT
Start: 2023-06-08 | End: 2023-06-12 | Stop reason: HOSPADM

## 2023-06-08 RX ORDER — DEXAMETHASONE SODIUM PHOSPHATE 4 MG/ML
INJECTION, SOLUTION INTRA-ARTICULAR; INTRALESIONAL; INTRAMUSCULAR; INTRAVENOUS; SOFT TISSUE
Status: DISCONTINUED | OUTPATIENT
Start: 2023-06-08 | End: 2023-06-08

## 2023-06-08 RX ORDER — ACETAMINOPHEN 500 MG
1000 TABLET ORAL EVERY 8 HOURS
Status: DISCONTINUED | OUTPATIENT
Start: 2023-06-08 | End: 2023-06-12 | Stop reason: HOSPADM

## 2023-06-08 RX ORDER — LIDOCAINE HYDROCHLORIDE 20 MG/ML
INJECTION, SOLUTION EPIDURAL; INFILTRATION; INTRACAUDAL; PERINEURAL
Status: DISPENSED
Start: 2023-06-08 | End: 2023-06-08

## 2023-06-08 RX ORDER — FENTANYL CITRATE 50 UG/ML
25-200 INJECTION, SOLUTION INTRAMUSCULAR; INTRAVENOUS
Status: DISCONTINUED | OUTPATIENT
Start: 2023-06-08 | End: 2023-06-08

## 2023-06-08 RX ORDER — HYDROMORPHONE HYDROCHLORIDE 2 MG/ML
INJECTION, SOLUTION INTRAMUSCULAR; INTRAVENOUS; SUBCUTANEOUS
Status: DISCONTINUED | OUTPATIENT
Start: 2023-06-08 | End: 2023-06-08

## 2023-06-08 RX ORDER — FENTANYL CITRATE 50 UG/ML
INJECTION, SOLUTION INTRAMUSCULAR; INTRAVENOUS
Status: DISCONTINUED | OUTPATIENT
Start: 2023-06-08 | End: 2023-06-08

## 2023-06-08 RX ORDER — PROPOFOL 10 MG/ML
VIAL (ML) INTRAVENOUS
Status: DISCONTINUED | OUTPATIENT
Start: 2023-06-08 | End: 2023-06-08

## 2023-06-08 RX ORDER — CLONIDINE 100 UG/ML
INJECTION, SOLUTION EPIDURAL
Status: DISPENSED
Start: 2023-06-08 | End: 2023-06-08

## 2023-06-08 RX ORDER — VANCOMYCIN HYDROCHLORIDE 1 G/20ML
INJECTION, POWDER, LYOPHILIZED, FOR SOLUTION INTRAVENOUS
Status: DISCONTINUED | OUTPATIENT
Start: 2023-06-08 | End: 2023-06-08 | Stop reason: HOSPADM

## 2023-06-08 RX ORDER — ROCURONIUM BROMIDE 10 MG/ML
INJECTION, SOLUTION INTRAVENOUS
Status: DISCONTINUED | OUTPATIENT
Start: 2023-06-08 | End: 2023-06-08

## 2023-06-08 RX ORDER — METHOCARBAMOL 500 MG/1
500 TABLET, FILM COATED ORAL EVERY 6 HOURS PRN
Status: DISCONTINUED | OUTPATIENT
Start: 2023-06-08 | End: 2023-06-12 | Stop reason: HOSPADM

## 2023-06-08 RX ORDER — LANOLIN ALCOHOL/MO/W.PET/CERES
1 CREAM (GRAM) TOPICAL 2 TIMES DAILY
Status: DISCONTINUED | OUTPATIENT
Start: 2023-06-08 | End: 2023-06-12 | Stop reason: HOSPADM

## 2023-06-08 RX ORDER — DEXAMETHASONE SODIUM PHOSPHATE 10 MG/ML
INJECTION INTRAMUSCULAR; INTRAVENOUS
Status: DISPENSED
Start: 2023-06-08 | End: 2023-06-08

## 2023-06-08 RX ORDER — ROPIVACAINE HYDROCHLORIDE 5 MG/ML
INJECTION, SOLUTION EPIDURAL; INFILTRATION; PERINEURAL
Status: COMPLETED | OUTPATIENT
Start: 2023-06-08 | End: 2023-06-08

## 2023-06-08 RX ORDER — CLINDAMYCIN PHOSPHATE 900 MG/50ML
900 INJECTION, SOLUTION INTRAVENOUS
Status: DISCONTINUED | OUTPATIENT
Start: 2023-06-08 | End: 2023-06-08 | Stop reason: HOSPADM

## 2023-06-08 RX ORDER — LABETALOL HYDROCHLORIDE 5 MG/ML
INJECTION, SOLUTION INTRAVENOUS
Status: DISCONTINUED | OUTPATIENT
Start: 2023-06-08 | End: 2023-06-08

## 2023-06-08 RX ADMIN — SODIUM CHLORIDE, SODIUM GLUCONATE, SODIUM ACETATE, POTASSIUM CHLORIDE, MAGNESIUM CHLORIDE, SODIUM PHOSPHATE, DIBASIC, AND POTASSIUM PHOSPHATE: .53; .5; .37; .037; .03; .012; .00082 INJECTION, SOLUTION INTRAVENOUS at 07:06

## 2023-06-08 RX ADMIN — VANCOMYCIN HYDROCHLORIDE 1000 MG: 1 INJECTION, POWDER, LYOPHILIZED, FOR SOLUTION INTRAVENOUS at 06:06

## 2023-06-08 RX ADMIN — Medication 2 G: at 07:06

## 2023-06-08 RX ADMIN — ACETAMINOPHEN 1000 MG: 500 TABLET ORAL at 09:06

## 2023-06-08 RX ADMIN — ROCURONIUM BROMIDE 50 MG: 10 INJECTION, SOLUTION INTRAVENOUS at 07:06

## 2023-06-08 RX ADMIN — ROCURONIUM BROMIDE 30 MG: 10 INJECTION, SOLUTION INTRAVENOUS at 08:06

## 2023-06-08 RX ADMIN — FENTANYL CITRATE 25 MCG: 50 INJECTION INTRAMUSCULAR; INTRAVENOUS at 06:06

## 2023-06-08 RX ADMIN — ACETAMINOPHEN 1000 MG: 500 TABLET ORAL at 02:06

## 2023-06-08 RX ADMIN — LIDOCAINE HYDROCHLORIDE 60 MG: 20 INJECTION INTRAVENOUS at 07:06

## 2023-06-08 RX ADMIN — FENTANYL CITRATE 50 MCG: 50 INJECTION, SOLUTION INTRAMUSCULAR; INTRAVENOUS at 07:06

## 2023-06-08 RX ADMIN — DEXAMETHASONE SODIUM PHOSPHATE 4 MG: 4 INJECTION, SOLUTION INTRAMUSCULAR; INTRAVENOUS at 07:06

## 2023-06-08 RX ADMIN — MUPIROCIN: 20 OINTMENT TOPICAL at 06:06

## 2023-06-08 RX ADMIN — SUGAMMADEX 200 MG: 100 INJECTION, SOLUTION INTRAVENOUS at 09:06

## 2023-06-08 RX ADMIN — CHOLECALCIFEROL TAB 25 MCG (1000 UNIT) 1000 UNITS: 25 TAB at 02:06

## 2023-06-08 RX ADMIN — CEFAZOLIN 2 G: 2 INJECTION, POWDER, FOR SOLUTION INTRAMUSCULAR; INTRAVENOUS at 05:06

## 2023-06-08 RX ADMIN — FERROUS SULFATE TAB 325 MG (65 MG ELEMENTAL FE) 1 EACH: 325 (65 FE) TAB at 02:06

## 2023-06-08 RX ADMIN — ROCURONIUM BROMIDE 10 MG: 10 INJECTION, SOLUTION INTRAVENOUS at 08:06

## 2023-06-08 RX ADMIN — THERA TABS 1 TABLET: TAB at 02:06

## 2023-06-08 RX ADMIN — ACETAMINOPHEN 650 MG: 325 TABLET ORAL at 05:06

## 2023-06-08 RX ADMIN — HYDROMORPHONE HYDROCHLORIDE 0.3 MG: 2 INJECTION, SOLUTION INTRAMUSCULAR; INTRAVENOUS; SUBCUTANEOUS at 08:06

## 2023-06-08 RX ADMIN — PROPOFOL 80 MG: 10 INJECTION, EMULSION INTRAVENOUS at 07:06

## 2023-06-08 RX ADMIN — TRANEXAMIC ACID 1000 MG: 100 INJECTION, SOLUTION INTRAVENOUS at 09:06

## 2023-06-08 RX ADMIN — ROPIVACAINE HYDROCHLORIDE 20 ML: 5 INJECTION EPIDURAL; INFILTRATION; PERINEURAL at 07:06

## 2023-06-08 RX ADMIN — TRANEXAMIC ACID 1000 MG: 100 INJECTION, SOLUTION INTRAVENOUS at 07:06

## 2023-06-08 RX ADMIN — LEVETIRACETAM 250 MG: 250 TABLET, FILM COATED ORAL at 09:06

## 2023-06-08 RX ADMIN — ROPIVACAINE HYDROCHLORIDE 0.1 ML/HR: 2 INJECTION, SOLUTION EPIDURAL; INFILTRATION at 10:06

## 2023-06-08 RX ADMIN — MIRTAZAPINE 15 MG: 15 TABLET, FILM COATED ORAL at 09:06

## 2023-06-08 RX ADMIN — SODIUM CHLORIDE: 9 INJECTION, SOLUTION INTRAVENOUS at 07:06

## 2023-06-08 RX ADMIN — FERROUS SULFATE TAB 325 MG (65 MG ELEMENTAL FE) 1 EACH: 325 (65 FE) TAB at 09:06

## 2023-06-08 RX ADMIN — ONDANSETRON 4 MG: 2 INJECTION INTRAMUSCULAR; INTRAVENOUS at 09:06

## 2023-06-08 RX ADMIN — APIXABAN 2.5 MG: 2.5 TABLET, FILM COATED ORAL at 09:06

## 2023-06-08 RX ADMIN — MUPIROCIN: 20 OINTMENT TOPICAL at 09:06

## 2023-06-08 RX ADMIN — SENNOSIDES AND DOCUSATE SODIUM 1 TABLET: 50; 8.6 TABLET ORAL at 09:06

## 2023-06-08 RX ADMIN — LABETALOL HYDROCHLORIDE 5 MG: 5 INJECTION, SOLUTION INTRAVENOUS at 08:06

## 2023-06-08 NOTE — OP NOTE
OP NOTE    DOS:  06/08/2023    Preop Dx: Right periprosthetic distal femur fracture above total knee replacement    Postop Dx: Right periprosthetic distal femur fracture above total knee replacement    Procedure: Retrograde intramedullary nail fixation right distal femur fracture with side plate augmentation - 52655.22    Surgeon: Nicolas Min M.D.    Asst:  Cristiane Martell M.D, Joni Burns MD    Anesthesia: GETA    EBL:  100cc    IVF:  1500cc crystalloid    Implants: Synthes retrograde femoral nail 400 x 12 mm with locking side plate and 4 distal interlocking screws with 3.5 mm accessory locking screws and 1 proximal interlocking screw    Specimens: None    Findings: Good alignment and fixation    Dispo:  To PACU extubated/stable       Indications for Procedure:      83-year-old female fell from standing height resulting in a right periprosthetic distal femur fracture above a total knee arthroplasty.  We discussed options for revision to a distal femoral replacing revision total knee versus fixation.  I am proceeding with retrograde intramedullary nail fixation with side plate locking construct for extra stability.  The risks, benefits and alternatives to surgery discussed with the patient and the patient's daughter given her early dementia.  Informed consent was obtained.    I am pending 0.22 modifier to this case given the complexity of the fracture and the need for locking side plate augmentation with nailing and the extra time work involved.    Procedure in Detail:    Patient was identified the preoperative holding area and the site was marked.  Regional analgesia was administered.  Patient was wheeled to the operating room and placed on the operating table in a supine position.  General endotracheal anesthesia was induced.  The right lower extremity was bumped and prepped and draped in sterile fashion.  A time-out was undertaken to confirm patient, side, site, surgery, surgeon and administration of  preoperative antibiotics and tranexamic acid.  All agreed and we proceeded.    I began by incising through the distal portion of her total knee incision overlying the patellar tendon.  I incised the patellar tendon its midsubstance.  I had the leg draped over a triangle and was pulling length alignment.  I placed a guidewire for the entry Reamer in the appropriate position.  The patient had a very old total knee with a large opening at the femoral portion the prosthesis.  After placing the guidewire and seeing its position on lateral fluoroscopy I opted to use the 5 degree distal been nail rather than the 10 degree as it was able to be put fairly anterior.  I used the entry Reamer and was obvious that the opening of the box was quite large.  I placed the reaming yanelis proximally and measured.  A 40 mm nail was appropriate.  Patient had a very stiff pipe femur and I did not ream.    I placed a 12 x 400 mm nail over the guide yanelis and seated just inside the prosthesis.  At this point I made a lateral incision and dissected through the iliotibial band and down to the lateral femoral condyle.  I placed a cannula for the distal interlocking screw near bone and then used the locking washer/side plate lining this up with the cannulas.  I placed a conical partially threaded screw in the proximal interlocking screw hole to suck the plate down to bone.  I then placed a locking screw in the distal hole which locks into the side plate and threads into the nail.  I then placed the anterolateral to posteromedial interlocking screw through the lateral incision.  I then made a small separate medial incision and placed another oblique locking screw.  At this point I removed the initial conical screw and replaced this with a locking screw.    At this point I checked the anteversion of the femur and found to be about 15°.  Once I was happy with the rotation, I removed the distal insertion handle.  I then used the in-situ Benders to bend  the leaves of the distal locking plate to hug the bone.  I then placed several locking 3.5 mm variable angle screws in the distal articular block.  After confirming that had good fixation and appropriate screw placement in all the areas distally with both the interlocking screws in the variable angle locking screws in the side plate I turned my attention proximally.      Using perfect circles technique I placed a proximal interlocking screw through the nail getting good purchase.  Final images were taken throughout and had good length alignment and good hardware placement.  All the wounds were copiously irrigated with normal saline solution.  The patellar tendon and paratenon were closed with 0 Vicryl suture over vancomycin cefepime powder.  The lateral wound was closed in the iliotibial band with 0 Vicryl suture over the remainder of the vancomycin cefepime powder.  All the deep fascia was then closed 0 Vicryl suture, the subcutaneous tissue with 3-0 Vicryl suture and the skin with 3-0 Monocryl suture and Dermabond.  Sterile dressings were applied throughout.      All instrument sponge counts were reported correct in the case.  There were no complications.  The patient was extubated, awakened and taken to recovery room in stable condition.      Plan the patient:     She will have range of motion as tolerated the knee but be nonweightbearing for 8 weeks pending the beginning of fracture healing.  We will supplement her vitamin-D DVT prophylaxis x6 weeks.  Multimodal pain management limiting narcotics.    Nicolas Min MD

## 2023-06-08 NOTE — SUBJECTIVE & OBJECTIVE
Interval history- seen in room on floor with family at bedside after surgery and they report shes been sleeping and comfortable since surgery. Lunch tray at bedside but has not wanted to eat yet so theyre waiting for her to be more awake. They have medical personnel in their family who know how to move patients with sheets and have people to take care of her at home so prefer HH once medicaly ready for discahrge which will likely be Sunday vs Monday as PT will start tomorrow and will monitor for pain and hg a few days with thearpy over the weekend. Family reports will ikely need a hospital bed as hkeep her bed low to the ground to avoid falls but likely will be too low now with her mobility post op and this will help with mobility while recovering so will work on this with CM once closer to discharge for her and pending PT recs further also. Given  1 U to darin her up for surgery and Hg holding steady this morning pre op. Her iron is low with erriting of 26 and iron replacement started now. She had fevers yesterday and no source has been found on admission for this, her UA was normal and CXR no signs of infection and no sputum per family and no fevers at home prior ot admit. Prcalcitonin negative on admit. Denies diarrhea at home, she has an abd surgery recently but has not had issues with recovery recently per family at bedside nor abdominal pain. CRP ordered for today to assess but likely will be elevated with fx but will assessto check a baseline. If persists further will likely delve into a further work up. Eliquis to start post op for dvt ppx with end date July 14th for 35 days post op.   Review of patient's allergies indicates:  No Known Allergies    No current facility-administered medications on file prior to encounter.     Current Outpatient Medications on File Prior to Encounter   Medication Sig    ferrous sulfate 220 mg (44 mg iron)/5 mL solution Take 220 mg by mouth.    furosemide (LASIX) 20 MG tablet TAKE  1 TABLET BY MOUTH ONCE DAILY AS NEEDED FOR EDEMA    levETIRAcetam (KEPPRA) 250 MG Tab Take 1 tablet (250 mg total) by mouth nightly. (Patient not taking: Reported on 3/15/2023)    mirtazapine (REMERON) 15 MG tablet Take 15 mg by mouth every evening.    multivitamin capsule Take 1 capsule by mouth once daily.    potassium chloride SA (K-DUR,KLOR-CON M) 10 MEQ tablet Take 10 mEq by mouth daily as needed.    venlafaxine (EFFEXOR-XR) 37.5 MG 24 hr capsule Take 1 capsule (37.5 mg total) by mouth once daily. (Patient not taking: Reported on 3/15/2023)     Family History    None       Tobacco Use    Smoking status: Never    Smokeless tobacco: Never   Substance and Sexual Activity    Alcohol use: Never    Drug use: Never    Sexual activity: Not on file     Review of Systems   Reason unable to perform ROS: Patient unable to answer questions appropriately.   Objective:     Vital Signs (Most Recent):  Temp: 97.2 °F (36.2 °C) (06/08/23 1152)  Pulse: 76 (06/08/23 1152)  Resp: 16 (06/08/23 1152)  BP: 128/62 (06/08/23 1152)  SpO2: 97 % (06/08/23 1152) Vital Signs (24h Range):  Temp:  [97.2 °F (36.2 °C)-100.7 °F (38.2 °C)] 97.2 °F (36.2 °C)  Pulse:  [73-98] 76  Resp:  [14-19] 16  SpO2:  [95 %-100 %] 97 %  BP: (121-172)/(59-82) 128/62     Weight: 49.8 kg (109 lb 12.6 oz)  Body mass index is 21.44 kg/m².     Physical Exam  Constitutional:       General: She is not in acute distress.     Appearance: Normal appearance. She is not ill-appearing.   HENT:      Head: Normocephalic and atraumatic.      Right Ear: Tympanic membrane normal.      Left Ear: Tympanic membrane normal.   Cardiovascular:      Rate and Rhythm: Normal rate and regular rhythm.      Pulses: Normal pulses.      Heart sounds: Normal heart sounds. No murmur heard.  Pulmonary:      Effort: No respiratory distress.      Breath sounds: No wheezing.   Abdominal:      General: Abdomen is flat. Bowel sounds are normal.      Palpations: Abdomen is soft.   Genitourinary:      Comments: Indwelling arboleda catheter in place with clear urine  Musculoskeletal:         General: No swelling.      Cervical back: Normal range of motion and neck supple.      Comments: R knee with bandages and ropivicaine   Skin:     General: Skin is warm and dry.      Capillary Refill: Capillary refill takes 2 to 3 seconds.   Neurological:      General: No focal deficit present.      Mental Status: She is alert. She is disoriented.   Psychiatric:         Mood and Affect: Mood normal.              Significant Labs: All pertinent labs within the past 24 hours have been reviewed.  BMP:   Recent Labs   Lab 06/08/23  0528   *   *   K 4.1      CO2 24   BUN 16   CREATININE 1.1   CALCIUM 9.1   MG 1.9       CBC:   Recent Labs   Lab 06/06/23 2254 06/07/23 0445 06/08/23  0528   WBC 9.19 8.58 7.93   HGB 8.4* 7.7* 9.6*   HCT 28.0* 24.8* 29.8*    246 202       CMP:   Recent Labs   Lab 06/06/23 2254 06/07/23 0445 06/08/23  0528    138 134*   K 4.5 4.6 4.1    104 101   CO2 24 25 24   * 136* 122*   BUN 15 16 16   CREATININE 1.33 1.4 1.1   CALCIUM 9.0 9.2 9.1   PROT 8.4 7.2 7.2   ALBUMIN 4.4 3.3* 3.1*   BILITOT 0.4 0.5 0.9   ALKPHOS 99 99 102   AST 22 13 15   ALT 14 8* 8*   ANIONGAP 13 9 9         Significant Imaging: I have reviewed all pertinent imaging results/findings within the past 24 hours.  I have reviewed and interpreted all pertinent imaging results/findings within the past 24 hours.

## 2023-06-08 NOTE — ANESTHESIA PREPROCEDURE EVALUATION
[facility]  Anesthesia Pre-Operative Evaluation         Patient Name: Ally Steiner  YOB: 1940  MRN: [id number]    SUBJECTIVE:     Pre-operative evaluation for Procedure(s) (LRB):  INSERTION, INTRAMEDULLARY FARHAT, FEMUR, DISTAL, RETROGRADE - right. trios, synthes, c arm door side (Right)     06/07/2023    Ally Steiner is a 83 y.o. female w/ a significant PMHx of DM2, HLD, HTN, CKD3, seizure disorder, and Alzheimer's. She presented for evaluation after a mechanical fall. Imaging showed a right distal femur fracture. Hgb 7.7 and she was transfused 1 unit of pRBCs. Decision made to pursue operative intervention.    Patient now presents for the above procedure(s).      LDA:        Peripheral IV - Single Lumen 06/07/23 0107 20 G Left Antecubital (Active)   Site Assessment Clean;Dry;Intact 06/07/23 0800   Extremity Assessment Distal to IV No abnormal discoloration;No redness;No swelling;No warmth 06/07/23 0800   Line Status Saline locked 06/07/23 0800   Dressing Status Clean;Dry;Intact 06/07/23 0800   Dressing Intervention Integrity maintained 06/07/23 0800   Number of days: 0            Urethral Catheter 06/06/23 2300 (Active)   Output (mL) 550 mL 06/07/23 2209   Number of days: 0       Prev airway: 01/06/21; Placement Time: 1005 (created via procedure documentation); Method of Intubation: Video Laryngoscopy; Inserted by: CRNA; Airway Device: Nasotracheal Tube; Mask Ventilation: Easy; Intubated: Postinduction; Airway Device Size: 7.0; Style: Cuffed; Cuff Inflation: Minimal occlusive pressure; Placement Verified By: Capnometry, ETT Condensation, Auscultation; Grade: Grade I; Complicating Factors: None    Drips: None documented.      Patient Active Problem List   Diagnosis    Osteoarthritis of multiple joints    Late onset Alzheimer's disease with behavioral disturbance    Essential hypertension    Pure hypercholesterolemia    Stage 3a chronic kidney disease    Seizure  disorder    Periprosthetic fracture of the right distal femur    Preop cardiovascular exam    Anemia of chronic disease    Closed fracture of right distal femur       Review of patient's allergies indicates:  No Known Allergies    Current Inpatient Medications:   heparin (porcine)  5,000 Units Subcutaneous Q8H    levETIRAcetam  250 mg Oral Nightly    mirtazapine  15 mg Oral QHS    multivitamin  1 tablet Oral Daily    mupirocin   Nasal BID    [START ON 6/8/2023] vitamin D  1,000 Units Oral Daily       No current facility-administered medications on file prior to encounter.     Current Outpatient Medications on File Prior to Encounter   Medication Sig Dispense Refill    ferrous sulfate 220 mg (44 mg iron)/5 mL solution Take 220 mg by mouth.      furosemide (LASIX) 20 MG tablet TAKE 1 TABLET BY MOUTH ONCE DAILY AS NEEDED FOR EDEMA      levETIRAcetam (KEPPRA) 250 MG Tab Take 1 tablet (250 mg total) by mouth nightly. (Patient not taking: Reported on 3/15/2023) 30 tablet 5    mirtazapine (REMERON) 15 MG tablet Take 15 mg by mouth every evening.      multivitamin capsule Take 1 capsule by mouth once daily.      potassium chloride SA (K-DUR,KLOR-CON M) 10 MEQ tablet Take 10 mEq by mouth daily as needed.      venlafaxine (EFFEXOR-XR) 37.5 MG 24 hr capsule Take 1 capsule (37.5 mg total) by mouth once daily. (Patient not taking: Reported on 3/15/2023) 30 capsule 6       Past Surgical History:   Procedure Laterality Date    CHOLECYSTECTOMY      Hysterectomy      KNEE SURGERY Left 2018    knee replacement    KNEE SURGERY Right 2000    knee replacement    OPEN REDUCTION OF FRACTURE OF MANDIBLE Left 1/6/2021    Procedure: OPEN REDUCTION, FRACTURE, MANDIBLE;  Surgeon: Dar Mcgill MD;  Location: [address];  Service: ENT;  Laterality: Left;    REDUCTION OF BOTH BREASTS Bilateral 1990       Social History:  Tobacco Use: Low Risk     Smoking Tobacco Use: Never    Smokeless Tobacco Use: Never    Passive  Exposure: Not on file      Alcohol Use: Not At Risk    Frequency of Alcohol Consumption: Never    Average Number of Drinks: Patient does not drink    Frequency of Binge Drinking: Never        OBJECTIVE:     Vital Signs Range (Last 24H):  Temp:  [36.9 °C (98.5 °F)-38.2 °C (100.7 °F)]   Pulse:  [76-88]   Resp:  [16-18]   BP: (108-172)/(54-82)   SpO2:  [95 %-99 %]       Significant Labs:  Lab Results   Component Value Date    WBC 8.58 06/07/2023    HGB 7.7 (L) 06/07/2023    HCT 24.8 (L) 06/07/2023     06/07/2023    CHOL 247 (H) 07/09/2020    TRIG 142 07/09/2020    HDL 60 07/09/2020    ALT 8 (L) 06/07/2023    AST 13 06/07/2023     06/07/2023    K 4.6 06/07/2023     06/07/2023    CREATININE 1.4 06/07/2023    BUN 16 06/07/2023    CO2 25 06/07/2023    TSH 1.301 07/27/2022    INR 1.0 06/06/2023    HGBA1C 5.6 06/07/2023       Diagnostic Studies: No relevant studies.    EKG:   Results for orders placed or performed during the hospital encounter of 06/06/23   EKG 12-lead    Collection Time: 06/06/23 10:51 PM    Narrative    Test Reason : Z01.818,    Vent. Rate : 081 BPM     Atrial Rate : 081 BPM     P-R Int : 134 ms          QRS Dur : 070 ms      QT Int : 376 ms       P-R-T Axes : 068 009 026 degrees     QTc Int : 436 ms    Normal sinus rhythm  Nonspecific T wave abnormality  Abnormal ECG  When compared with ECG of 06-JUL-2022 21:13,  No significant change was found  Confirmed by Jonah Mathur MD ([id number]) on 6/7/2023 5:41:48 PM    Referred By: PREETI   SELF           Confirmed By:Jonah Mathur MD       2D ECHO:  TTE:  No results found for this or any previous visit.    MARY:  No results found for this or any previous visit.    ASSESSMENT/PLAN:           Pre-op Assessment    I have reviewed the Patient Summary Reports.     I have reviewed the Nursing Notes.    I have reviewed the Medications.     Review of Systems  Anesthesia Hx:  No problems with previous Anesthesia  History of prior surgery of  interest to airway management or planning:  Denies Personal Hx of Anesthesia complications.   Social:  Non-Smoker    Hematology/Oncology:  Hematology Normal        EENT/Dental:EENT/Dental Normal   Cardiovascular:   Hypertension hyperlipidemia    Pulmonary:  Pulmonary Normal    Renal/:   Chronic Renal Disease, CKD    Hepatic/GI:  Hepatic/GI Normal    Musculoskeletal:   Arthritis     Neurological:   Seizures  Dementia    Endocrine:  Endocrine Normal        Physical Exam  General: Alert and Confusion    Airway:  Mouth Opening: Normal  TM Distance: Normal  Tongue: Normal  Neck ROM: Normal ROM    Dental:  Edentulous    Chest/Lungs:  Clear to auscultation, Normal Respiratory Rate    Heart:  Rate: Normal  Rhythm: Regular Rhythm  Sounds: Normal        Anesthesia Plan  Type of Anesthesia, risks & benefits discussed:    Anesthesia Type: Gen ETT, Regional  Intra-op Monitoring Plan: Standard ASA Monitors  Post Op Pain Control Plan: multimodal analgesia and IV/PO Opioids PRN  Induction:  IV  Airway Plan: Direct, Post-Induction  Informed Consent: Informed consent signed with the Patient representative and all parties understand the risks and agree with anesthesia plan.  All questions answered.   ASA Score: 3  Day of Surgery Review of History & Physical: H&P Update referred to the surgeon/provider.    Ready For Surgery From Anesthesia Perspective.     .

## 2023-06-08 NOTE — NURSING TRANSFER
Nursing Transfer Note      6/8/2023     Reason patient is being transferred: postop    Transfer To: [id number]    Transfer via bed    Transfer with n/a    Transported by escort    Telemetry: n/a    Medicines sent: ropivacaine    Any special needs or follow-up needed:     Chart send with patient: Yes    Notified:     Patient reassessed at: 6/8/23  1  Upon arrival to floor: bed in lowest position  Report given to Nguyễn Barfield

## 2023-06-08 NOTE — ASSESSMENT & PLAN NOTE
Ferritin of 26 with anemia on admit with iron BID started for this.   -given age and comorbidities, likely would not benefit from extensive work up long term for this such as C scope

## 2023-06-08 NOTE — PROGRESS NOTES
[facility] jose - Surgery  [facility] Medicine  Progress Note    Patient Name: Ally Steiner  MRN: [id number]  Patient Class: IP- Inpatient   Admission Date: 6/7/2023  Length of Stay: 1 days  Attending Physician: Viki Mcneill MD  Primary Care Provider: Josh Fajardo MD        Subjective:     Principal Problem:Periprosthetic fracture around internal prosthetic joint        HPI:  Patient is a pleasant 83 year old AA female with a past medical history of DM2, dementia, hyperlipidemia, and hx of knee replacements bilaterally.  She is very demented and history is obtained via both of her daughters who are present at bedside.  She presented to [facility] ED with inability to bear weight after a mechanical fall down the stairs.  Denies LOC, but patient has had episodes of sundowning prompting her to try to walk down the stairs.  Otherwise she usually is able to walk down the stairs without issue and is able to walk long distances without limitation at her baseline.  She denies any head trauma.        At the outside ED, vital signs were stable, labs unremarkable.  XR of the femur shows periprosthetic R sided femoral fracture, she was transferred here for orthopedic evaluation, who recommended urgent surgical repair of her femur.  Hospital medicine consulted for preoperative evaluation.      Overview/Hospital Course:  No notes on file    Interval history- seen in room on floor with family at bedside after surgery and they report shes been sleeping and comfortable since surgery. Lunch tray at bedside but has not wanted to eat yet so theyre waiting for her to be more awake. They have medical personnel in their family who know how to move patients with sheets and have people to take care of her at home so prefer HH once medicaly ready for discahrge which will likely be Sunday vs Monday as PT will start tomorrow and will monitor for pain and hg a few days with thearpy over the weekend. Family reports will ikely need  a hospital bed as hkeep her bed low to the ground to avoid falls but likely will be too low now with her mobility post op and this will help with mobility while recovering so will work on this with CM once closer to discharge for her and pending PT recs further also. Given  1 U to darin her up for surgery and Hg holding steady this morning pre op. Her iron is low with erriting of 26 and iron replacement started now. She had fevers yesterday and no source has been found on admission for this, her UA was normal and CXR no signs of infection and no sputum per family and no fevers at home prior ot admit. Prcalcitonin negative on admit. Denies diarrhea at home, she has an abd surgery recently but has not had issues with recovery recently per family at bedside nor abdominal pain. CRP ordered for today to assess but likely will be elevated with fx but will assessto check a baseline. If persists further will likely delve into a further work up. Eliquis to start post op for dvt ppx with end date July 14th for 35 days post op.   Review of patient's allergies indicates:  No Known Allergies    No current facility-administered medications on file prior to encounter.     Current Outpatient Medications on File Prior to Encounter   Medication Sig    ferrous sulfate 220 mg (44 mg iron)/5 mL solution Take 220 mg by mouth.    furosemide (LASIX) 20 MG tablet TAKE 1 TABLET BY MOUTH ONCE DAILY AS NEEDED FOR EDEMA    levETIRAcetam (KEPPRA) 250 MG Tab Take 1 tablet (250 mg total) by mouth nightly. (Patient not taking: Reported on 3/15/2023)    mirtazapine (REMERON) 15 MG tablet Take 15 mg by mouth every evening.    multivitamin capsule Take 1 capsule by mouth once daily.    potassium chloride SA (K-DUR,KLOR-CON M) 10 MEQ tablet Take 10 mEq by mouth daily as needed.    venlafaxine (EFFEXOR-XR) 37.5 MG 24 hr capsule Take 1 capsule (37.5 mg total) by mouth once daily. (Patient not taking: Reported on 3/15/2023)     Family History     None       Tobacco Use    Smoking status: Never    Smokeless tobacco: Never   Substance and Sexual Activity    Alcohol use: Never    Drug use: Never    Sexual activity: Not on file     Review of Systems   Reason unable to perform ROS: Patient unable to answer questions appropriately.   Objective:     Vital Signs (Most Recent):  Temp: 97.2 °F (36.2 °C) (06/08/23 1152)  Pulse: 76 (06/08/23 1152)  Resp: 16 (06/08/23 1152)  BP: 128/62 (06/08/23 1152)  SpO2: 97 % (06/08/23 1152) Vital Signs (24h Range):  Temp:  [97.2 °F (36.2 °C)-100.7 °F (38.2 °C)] 97.2 °F (36.2 °C)  Pulse:  [73-98] 76  Resp:  [14-19] 16  SpO2:  [95 %-100 %] 97 %  BP: (121-172)/(59-82) 128/62     Weight: 49.8 kg (109 lb 12.6 oz)  Body mass index is 21.44 kg/m².     Physical Exam  Constitutional:       General: She is not in acute distress.     Appearance: Normal appearance. She is not ill-appearing.   HENT:      Head: Normocephalic and atraumatic.      Right Ear: Tympanic membrane normal.      Left Ear: Tympanic membrane normal.   Cardiovascular:      Rate and Rhythm: Normal rate and regular rhythm.      Pulses: Normal pulses.      Heart sounds: Normal heart sounds. No murmur heard.  Pulmonary:      Effort: No respiratory distress.      Breath sounds: No wheezing.   Abdominal:      General: Abdomen is flat. Bowel sounds are normal.      Palpations: Abdomen is soft.   Genitourinary:     Comments: Indwelling arboleda catheter in place with clear urine  Musculoskeletal:         General: No swelling.      Cervical back: Normal range of motion and neck supple.      Comments: R knee with bandages and ropivicaine   Skin:     General: Skin is warm and dry.      Capillary Refill: Capillary refill takes 2 to 3 seconds.   Neurological:      General: No focal deficit present.      Mental Status: She is alert. She is disoriented.   Psychiatric:         Mood and Affect: Mood normal.              Significant Labs: All pertinent labs within the past 24 hours have  been reviewed.  BMP:   Recent Labs   Lab 06/08/23  0528   *   *   K 4.1      CO2 24   BUN 16   CREATININE 1.1   CALCIUM 9.1   MG 1.9       CBC:   Recent Labs   Lab 06/06/23  2254 06/07/23  0445 06/08/23  0528   WBC 9.19 8.58 7.93   HGB 8.4* 7.7* 9.6*   HCT 28.0* 24.8* 29.8*    246 202       CMP:   Recent Labs   Lab 06/06/23 2254 06/07/23 0445 06/08/23  0528    138 134*   K 4.5 4.6 4.1    104 101   CO2 24 25 24   * 136* 122*   BUN 15 16 16   CREATININE 1.33 1.4 1.1   CALCIUM 9.0 9.2 9.1   PROT 8.4 7.2 7.2   ALBUMIN 4.4 3.3* 3.1*   BILITOT 0.4 0.5 0.9   ALKPHOS 99 99 102   AST 22 13 15   ALT 14 8* 8*   ANIONGAP 13 9 9         Significant Imaging: I have reviewed all pertinent imaging results/findings within the past 24 hours.  I have reviewed and interpreted all pertinent imaging results/findings within the past 24 hours.      Assessment/Plan:      * Periprosthetic fracture of the right distal femur  Sustained in fall and OR on 6/8 for retrograde nail with orthopedics  -PT/OT to start on POD 1, family prefers  and has 24/ care for her at home. Will likely need hospital bed and will f/u PT recs for this  -ropivicaine in place and multimodal management with pain controlled so far post op  -given 1 U of PRBCS for anemia on admit with hg holding steady so far today prior to surgery  -bandages to stay on until orthopedics follow up  -bowel regimen      Acute blood loss anemia  expcted with fracture and inflammation on top of chronic anemia, given 1 U PRBCS prior to surgery given hg 7.7. and continue to closely monitor      Fever    Unclear cause as present on admit and on 6/7 overnight. UA negative on admit, CXR without signs of PNA. Procal negative on admit, wbc not elevated  -no recent symptoms of infecition per family priro to admit  -if continues to persist post op may have to work up further including viral causes and consider LE US for any non infeciotu causes for  persistant fever    Iron deficiency anemia  Ferritin of 26 with anemia on admit with iron BID started for this.   -given age and comorbidities, likely would not benefit from extensive work up long term for this such as C scope      Anemia of chronic disease  Hg 9 baseline in the past, iron low and replacement started      Seizure disorder    Continue keppra    Stage 3a chronic kidney disease  Cr at [facility] on admit      Essential hypertension  On no meds at home to treat.       Late onset Alzheimer's disease with behavioral disturbance  Baseline dementia and has family assistance at home. Family at bedside during exam 6/8        VTE Risk Mitigation (From admission, onward)         Ordered     apixaban tablet 2.5 mg  2 times daily         06/08/23 1038     Place sequential compression device  Until discontinued         06/07/23 0239     IP VTE HIGH RISK PATIENT  Once         06/07/23 0239                Discharge Planning   EDDIE: 6/12/2023     Code Status: Full Code   Is the patient medically ready for discharge?: No    Reason for patient still in hospital (select all that apply): Patient trending condition  Discharge Plan A: Home Health, Home with family                  Viki Mcneill MD  Department of Hospital Medicine   [facility] - Surgery

## 2023-06-08 NOTE — ASSESSMENT & PLAN NOTE
Patient is a 83-year-old female who presents with a right distal femur periprosthetic fracture after a ground level fall 6/6/23  She ambulates independently in her home but uses a wheelchair for distances due to dementia.  She has a past medical history significant only for dementia.  She takes no anticoagulation at home.  She lives with her daughter.        To OR today for operative fixation right femur. Npo. Hickman. Hb 9.6 sp prbc 1u yeserday. prbc 2u held. nwb RLE in knee immobilizer

## 2023-06-08 NOTE — ANESTHESIA RELEASE NOTE
Anesthesia Release from PACU Note    Patient: Ally Steiner    Procedure(s) Performed: Procedure(s) (LRB):  INSERTION, INTRAMEDULLARY FARHAT, FEMUR, DISTAL, RETROGRADE - right. trios, synthes, c arm door side (Right)    Anesthesia type: general    Post pain: Adequate analgesia    Post assessment: no apparent anesthetic complications and tolerated procedure well    Last Vitals:   Visit Vitals  /62   Pulse 76   Temp 36.2 °C (97.2 °F)   Resp 16   Ht 5' (1.524 m)   Wt 49.8 kg (109 lb 12.6 oz)   SpO2 97%   BMI 21.44 kg/m²       Post vital signs: stable    Level of consciousness: awake and alert     Nausea/Vomiting: no nausea/no vomiting    Complications: none    Airway Patency: patent    Respiratory: unassisted, spontaneous ventilation, room air    Cardiovascular: stable and blood pressure at baseline    Hydration: euvolemic

## 2023-06-08 NOTE — ANESTHESIA PROCEDURE NOTES
Intubation    Date/Time: 6/8/2023 7:25 AM  Performed by: Mohit Cuadra  Authorized by: Viki Davidson MD     Intubation:     Induction:  Intravenous    Intubated:  Postinduction    Mask Ventilation:  Easy with oral airway    Attempts:  1    Attempted By:  Student    Method of Intubation:  Direct    Blade:  Mariel 3    Laryngeal View Grade: Grade I - full view of cords      Difficult Airway Encountered?: No      Complications:  None    Airway Device:  Oral endotracheal tube    Airway Device Size:  7.0    Style/Cuff Inflation:  Cuffed (inflated to minimal occlusive pressure)    Tube secured:  21    Secured at:  The lips    Placement Verified By:  Capnometry    Complicating Factors:  None    Findings Post-Intubation:  BS equal bilateral and atraumatic/condition of teeth unchanged  Notes:      Head and neck neutral throughout

## 2023-06-08 NOTE — SUBJECTIVE & OBJECTIVE
Principal Problem:Periprosthetic fracture around internal prosthetic joint    Principal Orthopedic Problem: same    Interval History: NAEON. Hb 9.6 today sp prbc1u. Intermittent fevers up to 100.7. patients daughter is bedside and provides history. Says patient acting normal, so signs of infection such as sob or cough that shes noticed. Ready for OR today    Review of patient's allergies indicates:  No Known Allergies    Current Facility-Administered Medications   Medication    0.9%  NaCl infusion (for blood administration)    acetaminophen tablet 650 mg    ceFAZolin 2 g in dextrose 5 % in water (D5W) 5 % 50 mL IVPB (MB+)    clindamycin in D5W 900 mg/50 mL IVPB 900 mg    cloNIDine 1,000 mcg/10 mL (100 mcg/mL) injection    dexAMETHasone sodium phos (PF) 10 mg/mL injection    dextrose 10% bolus 125 mL 125 mL    dextrose 10% bolus 250 mL 250 mL    dextrose 40 % gel 15,000 mg    dextrose 40 % gel 30,000 mg    EPINEPHrine (PF) (ADRENALIN) 1 mg/mL (1 mL) injection    EPINEPHrine (PF) (ADRENALIN) 1 mg/mL (1 mL) injection    fentaNYL 50 mcg/mL injection  mcg    glucagon (human recombinant) injection 1 mg    heparin (porcine) injection 5,000 Units    levETIRAcetam tablet 250 mg    LIDOcaine (PF) 20 mg/mL (2%) 20 mg/mL (2 %) injection    LIDOcaine (PF) 20 mg/mL (2%) 20 mg/mL (2 %) injection    midazolam (VERSED) 1 mg/mL injection 0.5-4 mg    mirtazapine tablet 15 mg    morphine injection 2 mg    multivitamin tablet    mupirocin 2 % ointment    mupirocin 2 % ointment    naloxone 0.4 mg/mL injection 0.02 mg    ondansetron injection 4 mg    sodium chloride 0.9% flush 10 mL    tranexamic acid (CYKLOKAPRON) 1,000 mg in sodium chloride 0.9 % 100 mL IVPB (MB+)    vancomycin (VANCOCIN) 1,000 mg in dextrose 5 % (D5W) 250 mL IVPB (Vial-Mate)    vancomycin (VANCOCIN) 1,000 mg in dextrose 5 % (D5W) 250 mL IVPB (Vial-Mate)    vitamin D 1000 units tablet 1,000 Units     Objective:     Vital Signs (Most Recent):  Temp: (!) 100.7 °F  (38.2 °C) (06/08/23 0605)  Pulse: 78 (06/08/23 0605)  Resp: 16 (06/08/23 0605)  BP: 134/62 (06/08/23 0605)  SpO2: 96 % (06/08/23 0605) Vital Signs (24h Range):  Temp:  [98.5 °F (36.9 °C)-100.7 °F (38.2 °C)] 100.7 °F (38.2 °C)  Pulse:  [75-98] 78  Resp:  [16-18] 16  SpO2:  [95 %-99 %] 96 %  BP: (108-172)/(54-82) 134/62     Weight: 49.8 kg (109 lb 12.6 oz)  Height: 5' (152.4 cm)  Body mass index is 21.44 kg/m².      Intake/Output Summary (Last 24 hours) at 6/8/2023 0644  Last data filed at 6/8/2023 0530  Gross per 24 hour   Intake --   Output 900 ml   Net -900 ml        Ortho/SPM Exam  Alert, confused    LLE  Knee immobilizer in place  NVI  distally- moves at  ankle and toes  Dp pulse 2+     Significant Labs: BMP:   Recent Labs   Lab 06/07/23  0445   *      K 4.6      CO2 25   BUN 16   CREATININE 1.4   CALCIUM 9.2   MG 1.6     CBC:   Recent Labs   Lab 06/06/23  2254 06/07/23  0445 06/08/23  0528   WBC 9.19 8.58 7.93   HGB 8.4* 7.7* 9.6*   HCT 28.0* 24.8* 29.8*    246 202     CMP:   Recent Labs   Lab 06/06/23  2254 06/07/23  0445    138   K 4.5 4.6    104   CO2 24 25   * 136*   BUN 15 16   CREATININE 1.33 1.4   CALCIUM 9.0 9.2   PROT 8.4 7.2   ALBUMIN 4.4 3.3*   BILITOT 0.4 0.5   ALKPHOS 99 99   AST 22 13   ALT 14 8*   ANIONGAP 13 9     All pertinent labs within the past 24 hours have been reviewed.    Significant Imaging: I have reviewed and interpreted all pertinent imaging results/findings.

## 2023-06-08 NOTE — PROGRESS NOTES
[facility] - Surgery ([facility])  Orthopedics  Progress Note    Patient Name: Ally Steiner  MRN: [id number]  Admission Date: 6/7/2023  Hospital Length of Stay: 1 days  Attending Provider: Viki Mcneill MD  Primary Care Provider: Josh Fajardo MD  Follow-up For: Procedure(s) (LRB):  INSERTION, INTRAMEDULLARY FARHAT, FEMUR, DISTAL, RETROGRADE - right. trios, synthes, c arm door side (Right)    Post-Operative Day: Day of Surgery  Subjective:     Principal Problem:Periprosthetic fracture around internal prosthetic joint    Principal Orthopedic Problem: same    Interval History: NAEON. Hb 9.6 today sp prbc1u. Intermittent fevers up to 100.7. patients daughter is bedside and provides history. Says patient acting normal, so signs of infection such as sob or cough that shes noticed. Ready for OR today    Review of patient's allergies indicates:  No Known Allergies    Current Facility-Administered Medications   Medication    0.9%  NaCl infusion (for blood administration)    acetaminophen tablet 650 mg    ceFAZolin 2 g in dextrose 5 % in water (D5W) 5 % 50 mL IVPB (MB+)    clindamycin in D5W 900 mg/50 mL IVPB 900 mg    cloNIDine 1,000 mcg/10 mL (100 mcg/mL) injection    dexAMETHasone sodium phos (PF) 10 mg/mL injection    dextrose 10% bolus 125 mL 125 mL    dextrose 10% bolus 250 mL 250 mL    dextrose 40 % gel 15,000 mg    dextrose 40 % gel 30,000 mg    EPINEPHrine (PF) (ADRENALIN) 1 mg/mL (1 mL) injection    EPINEPHrine (PF) (ADRENALIN) 1 mg/mL (1 mL) injection    fentaNYL 50 mcg/mL injection  mcg    glucagon (human recombinant) injection 1 mg    heparin (porcine) injection 5,000 Units    levETIRAcetam tablet 250 mg    LIDOcaine (PF) 20 mg/mL (2%) 20 mg/mL (2 %) injection    LIDOcaine (PF) 20 mg/mL (2%) 20 mg/mL (2 %) injection    midazolam (VERSED) 1 mg/mL injection 0.5-4 mg    mirtazapine tablet 15 mg    morphine injection 2 mg    multivitamin tablet    mupirocin 2 % ointment    mupirocin 2 % ointment     naloxone 0.4 mg/mL injection 0.02 mg    ondansetron injection 4 mg    sodium chloride 0.9% flush 10 mL    tranexamic acid (CYKLOKAPRON) 1,000 mg in sodium chloride 0.9 % 100 mL IVPB (MB+)    vancomycin (VANCOCIN) 1,000 mg in dextrose 5 % (D5W) 250 mL IVPB (Vial-Mate)    vancomycin (VANCOCIN) 1,000 mg in dextrose 5 % (D5W) 250 mL IVPB (Vial-Mate)    vitamin D 1000 units tablet 1,000 Units     Objective:     Vital Signs (Most Recent):  Temp: (!) 100.7 °F (38.2 °C) (06/08/23 0605)  Pulse: 78 (06/08/23 0605)  Resp: 16 (06/08/23 0605)  BP: 134/62 (06/08/23 0605)  SpO2: 96 % (06/08/23 0605) Vital Signs (24h Range):  Temp:  [98.5 °F (36.9 °C)-100.7 °F (38.2 °C)] 100.7 °F (38.2 °C)  Pulse:  [75-98] 78  Resp:  [16-18] 16  SpO2:  [95 %-99 %] 96 %  BP: (108-172)/(54-82) 134/62     Weight: 49.8 kg (109 lb 12.6 oz)  Height: 5' (152.4 cm)  Body mass index is 21.44 kg/m².      Intake/Output Summary (Last 24 hours) at 6/8/2023 0644  Last data filed at 6/8/2023 0530  Gross per 24 hour   Intake --   Output 900 ml   Net -900 ml        Ortho/SPM Exam  Alert, confused    LLE  Knee immobilizer in place  NVI  distally- moves at  ankle and toes  Dp pulse 2+     Significant Labs: BMP:   Recent Labs   Lab 06/07/23  0445   *      K 4.6      CO2 25   BUN 16   CREATININE 1.4   CALCIUM 9.2   MG 1.6     CBC:   Recent Labs   Lab 06/06/23  2254 06/07/23  0445 06/08/23  0528   WBC 9.19 8.58 7.93   HGB 8.4* 7.7* 9.6*   HCT 28.0* 24.8* 29.8*    246 202     CMP:   Recent Labs   Lab 06/06/23  2254 06/07/23  0445    138   K 4.5 4.6    104   CO2 24 25   * 136*   BUN 15 16   CREATININE 1.33 1.4   CALCIUM 9.0 9.2   PROT 8.4 7.2   ALBUMIN 4.4 3.3*   BILITOT 0.4 0.5   ALKPHOS 99 99   AST 22 13   ALT 14 8*   ANIONGAP 13 9     All pertinent labs within the past 24 hours have been reviewed.    Significant Imaging: I have reviewed and interpreted all pertinent imaging results/findings.    Assessment/Plan:     *  Periprosthetic fracture of the right distal femur  Patient is a 83-year-old female who presents with a right distal femur periprosthetic fracture after a ground level fall 6/6/23  She ambulates independently in her home but uses a wheelchair for distances due to dementia.  She has a past medical history significant only for dementia.  She takes no anticoagulation at home.  She lives with her daughter.        To OR today for operative fixation right femur. Npo. Hickman. Hb 9.6 sp prbc 1u yeserday. prbc 2u held. nwb RLE in knee immobilizer. W regards to her fevers per HM yesterday: procal normal. WBC normal. CXR with no acute findings and U/A not consistent with UTI so would just monitor for now. Patient has no signs or symptoms of infection on exam.          Cristiane Martell MD  Orthopedics  [facility] - Surgery ([facility])

## 2023-06-08 NOTE — TRANSFER OF CARE
Anesthesia Transfer of Care Note    Patient: Ally Steiner    Procedure(s) Performed: Procedure(s) (LRB):  INSERTION, INTRAMEDULLARY FARHAT, FEMUR, DISTAL, RETROGRADE - right. trios, synthes, c arm door side (Right)    Patient location: PACU    Anesthesia Type: general    Transport from OR: Transported from OR on 6-10 L/min O2 by face mask with adequate spontaneous ventilation    Post pain: adequate analgesia    Post assessment: no apparent anesthetic complications and tolerated procedure well    Post vital signs: stable    Level of consciousness: awake and alert    Nausea/Vomiting: no nausea/vomiting    Complications: none    Transfer of care protocol was followedComments: Nurse at bedside, VSS, spont reg resp noted      Last vitals:   Visit Vitals  /82 (BP Location: Left arm)   Pulse 77   Temp (!) 38.2 °C (100.7 °F) (Oral)   Resp 17   Ht 5' (1.524 m)   Wt 49.8 kg (109 lb 12.6 oz)   SpO2 100%   BMI 21.44 kg/m²

## 2023-06-08 NOTE — ANESTHESIA PROCEDURE NOTES
Right SIFI catheter    Patient location during procedure: pre-op   Block not for primary anesthetic.  Reason for block: at surgeon's request and post-op pain management   Post-op Pain Location: right hip pain   Start time: 6/8/2023 6:51 AM  Timeout: 6/8/2023 6:50 AM   End time: 6/8/2023 7:04 AM    Staffing  Authorizing Provider: Liseth Perea MD  Performing Provider: Moira Kolb MD    Preanesthetic Checklist  Completed: patient identified, IV checked, site marked, risks and benefits discussed, surgical consent, monitors and equipment checked, pre-op evaluation and timeout performed  Peripheral Block  Patient position: supine  Prep: ChloraPrep and site prepped and draped  Patient monitoring: heart rate, cardiac monitor, continuous pulse ox, continuous capnometry and frequent blood pressure checks  Block type: fascia iliaca  Laterality: right  Injection technique: continuous  Needle  Needle type: Tuohy   Needle gauge: 17 G  Needle length: 3.5 in  Needle localization: anatomical landmarks and ultrasound guidance  Catheter type: spring wound  Catheter size: 19 G  Test dose: lidocaine 1.5% with Epi 1-to-200,000 and negative   -ultrasound image captured on disc.  Assessment  Injection assessment: negative aspiration, negative parasthesia and local visualized surrounding nerve  Paresthesia pain: none  Heart rate change: no  Slow fractionated injection: yes    Medications:    Medications: ropivacaine (NAROPIN) injection 0.5% - Perineural   20 mL - 6/8/2023 7:04:00 AM    Additional Notes  VSS.  [facility] RN monitoring vitals throughout procedure.  Patient tolerated procedure well.

## 2023-06-08 NOTE — ASSESSMENT & PLAN NOTE
Unclear cause as present on admit and on 6/7 overnight. UA negative on admit, CXR without signs of PNA. Procal negative on admit, wbc not elevated  -no recent symptoms of infecition per family priro to admit  -if continues to persist post op may have to work up further including viral causes and consider LE US for any non infeciotu causes for persistant fever

## 2023-06-08 NOTE — ANESTHESIA POSTPROCEDURE EVALUATION
Anesthesia Post Evaluation    Patient: Ally Steiner    Procedure(s) Performed: Procedure(s) (LRB):  INSERTION, INTRAMEDULLARY FARHAT, FEMUR, DISTAL, RETROGRADE - right. trios, synthes, c arm door side (Right)    Final Anesthesia Type: general      Patient location during evaluation: PACU  Patient participation: Yes- Able to Participate  Level of consciousness: awake and alert  Post-procedure vital signs: reviewed and stable  Pain management: adequate  Airway patency: patent    PONV status at discharge: No PONV  Anesthetic complications: no      Cardiovascular status: hemodynamically stable  Respiratory status: unassisted, spontaneous ventilation and room air  Hydration status: euvolemic  Follow-up not needed.          Vitals Value Taken Time   /62 06/08/23 1152   Temp 36.2 °C (97.2 °F) 06/08/23 1152   Pulse 76 06/08/23 1152   Resp 16 06/08/23 1152   SpO2 97 % 06/08/23 1152         Event Time   Out of Recovery 06/08/2023 11:00:00         Pain/Emily Score: Pain Rating Prior to Med Admin: 0 (6/8/2023  2:33 PM)  Emily Score: 10 (6/8/2023 11:15 AM)

## 2023-06-08 NOTE — ASSESSMENT & PLAN NOTE
expcted with fracture and inflammation on top of chronic anemia, given 1 U PRBCS prior to surgery given hg 7.7. and continue to closely monitor

## 2023-06-08 NOTE — SUBJECTIVE & OBJECTIVE
Principal Problem:Periprosthetic fracture around internal prosthetic joint    Principal Orthopedic Problem: ***    Interval History: ***    Review of patient's allergies indicates:  No Known Allergies    Current Facility-Administered Medications   Medication    0.9%  NaCl infusion (for blood administration)    acetaminophen tablet 650 mg    ceFAZolin 2 g in dextrose 5 % in water (D5W) 5 % 50 mL IVPB (MB+)    clindamycin in D5W 900 mg/50 mL IVPB 900 mg    cloNIDine 1,000 mcg/10 mL (100 mcg/mL) injection    dexAMETHasone sodium phos (PF) 10 mg/mL injection    dextrose 10% bolus 125 mL 125 mL    dextrose 10% bolus 250 mL 250 mL    dextrose 40 % gel 15,000 mg    dextrose 40 % gel 30,000 mg    EPINEPHrine (PF) (ADRENALIN) 1 mg/mL (1 mL) injection    EPINEPHrine (PF) (ADRENALIN) 1 mg/mL (1 mL) injection    fentaNYL 50 mcg/mL injection  mcg    glucagon (human recombinant) injection 1 mg    heparin (porcine) injection 5,000 Units    levETIRAcetam tablet 250 mg    LIDOcaine (PF) 20 mg/mL (2%) 20 mg/mL (2 %) injection    LIDOcaine (PF) 20 mg/mL (2%) 20 mg/mL (2 %) injection    midazolam (VERSED) 1 mg/mL injection 0.5-4 mg    mirtazapine tablet 15 mg    morphine injection 2 mg    multivitamin tablet    mupirocin 2 % ointment    mupirocin 2 % ointment    naloxone 0.4 mg/mL injection 0.02 mg    ondansetron injection 4 mg    sodium chloride 0.9% flush 10 mL    tranexamic acid (CYKLOKAPRON) 1,000 mg in sodium chloride 0.9 % 100 mL IVPB (MB+)    vitamin D 1000 units tablet 1,000 Units     Objective:     Vital Signs (Most Recent):  Temp: (!) 100.7 °F (38.2 °C) (06/08/23 0605)  Pulse: 78 (06/08/23 0605)  Resp: 16 (06/08/23 0605)  BP: 134/62 (06/08/23 0605)  SpO2: 96 % (06/08/23 0605) Vital Signs (24h Range):  Temp:  [98.5 °F (36.9 °C)-100.7 °F (38.2 °C)] 100.7 °F (38.2 °C)  Pulse:  [75-98] 78  Resp:  [16-18] 16  SpO2:  [95 %-99 %] 96 %  BP: (108-172)/(54-82) 134/62     Weight: 49.8 kg (109 lb 12.6  "oz)  Height: 5' (152.4 cm)  Body mass index is 21.44 kg/m².      Intake/Output Summary (Last 24 hours) at 6/8/2023 0638  Last data filed at 6/8/2023 0530  Gross per 24 hour   Intake --   Output 900 ml   Net -900 ml        Ortho/SPM Exam     Significant Labs: {Results:18125::"All pertinent labs within the past 24 hours have been reviewed."}    Significant Imaging: {Imaging Review:55267}  "

## 2023-06-08 NOTE — ASSESSMENT & PLAN NOTE
Sustained in fall and OR on 6/8 for retrograde nail with orthopedics  -PT/OT to start on POD 1, family prefers HH and has 24/ care for her at home. Will likely need hospital bed and will f/u PT recs for this  -ropivicaine in place and multimodal management with pain controlled so far post op  -given 1 U of PRBCS for anemia on admit with hg holding steady so far today prior to surgery  -bandages to stay on until orthopedics follow up  -bowel regimen

## 2023-06-08 NOTE — PROGRESS NOTES
Pharmacist Renal Dose Adjustment Note    Ally Steiner is a 83 y.o. female being treated with the medication Cefazolin.    Patient Data:    Vital Signs (Most Recent):  Temp: (!) 100.7 °F (38.2 °C) (06/08/23 0605)  Pulse: 77 (06/08/23 1037)  Resp: 17 (06/08/23 1037)  BP: 130/82 (06/08/23 1037)  SpO2: 100 % (06/08/23 1037) Vital Signs (72h Range):  Temp:  [98.5 °F (36.9 °C)-100.7 °F (38.2 °C)]   Pulse:  [73-98]   Resp:  [15-20]   BP: (108-172)/(54-82)   SpO2:  [95 %-100 %]      Recent Labs   Lab 06/06/23  2254 06/07/23  0445 06/08/23  0528   CREATININE 1.33 1.4 1.1     Serum creatinine: 1.1 mg/dL 06/08/23 0528  Estimated creatinine clearance: 28.3 mL/min    Medication:Cefazolin 2 gm IV q8hrs will be changed to 2 gm IV q12hrs for crcl < 30 mL/min.    Pharmacist's Name: Kirti Kapadia  Pharmacist's Extension: [phone]

## 2023-06-09 LAB
ALBUMIN SERPL BCP-MCNC: 2.9 G/DL (ref 3.5–5.2)
ALP SERPL-CCNC: 89 U/L (ref 55–135)
ALT SERPL W/O P-5'-P-CCNC: 8 U/L (ref 10–44)
ANION GAP SERPL CALC-SCNC: 9 MMOL/L (ref 8–16)
AST SERPL-CCNC: 24 U/L (ref 10–40)
BASOPHILS # BLD AUTO: 0.01 K/UL (ref 0–0.2)
BASOPHILS NFR BLD: 0.1 % (ref 0–1.9)
BILIRUB SERPL-MCNC: 0.4 MG/DL (ref 0.1–1)
BUN SERPL-MCNC: 15 MG/DL (ref 8–23)
CALCIUM SERPL-MCNC: 9.1 MG/DL (ref 8.7–10.5)
CHLORIDE SERPL-SCNC: 101 MMOL/L (ref 95–110)
CO2 SERPL-SCNC: 23 MMOL/L (ref 23–29)
CREAT SERPL-MCNC: 1.3 MG/DL (ref 0.5–1.4)
DIFFERENTIAL METHOD: ABNORMAL
EOSINOPHIL # BLD AUTO: 0.1 K/UL (ref 0–0.5)
EOSINOPHIL NFR BLD: 1.1 % (ref 0–8)
ERYTHROCYTE [DISTWIDTH] IN BLOOD BY AUTOMATED COUNT: 15.3 % (ref 11.5–14.5)
EST. GFR  (NO RACE VARIABLE): 41.1 ML/MIN/1.73 M^2
GLUCOSE SERPL-MCNC: 120 MG/DL (ref 70–110)
HCT VFR BLD AUTO: 25.3 % (ref 37–48.5)
HGB BLD-MCNC: 8.2 G/DL (ref 12–16)
IMM GRANULOCYTES # BLD AUTO: 0.05 K/UL (ref 0–0.04)
IMM GRANULOCYTES NFR BLD AUTO: 0.6 % (ref 0–0.5)
LYMPHOCYTES # BLD AUTO: 1.2 K/UL (ref 1–4.8)
LYMPHOCYTES NFR BLD: 14.6 % (ref 18–48)
MAGNESIUM SERPL-MCNC: 1.6 MG/DL (ref 1.6–2.6)
MCH RBC QN AUTO: 26.7 PG (ref 27–31)
MCHC RBC AUTO-ENTMCNC: 32.4 G/DL (ref 32–36)
MCV RBC AUTO: 82 FL (ref 82–98)
MONOCYTES # BLD AUTO: 1 K/UL (ref 0.3–1)
MONOCYTES NFR BLD: 11.4 % (ref 4–15)
NEUTROPHILS # BLD AUTO: 6 K/UL (ref 1.8–7.7)
NEUTROPHILS NFR BLD: 72.2 % (ref 38–73)
NRBC BLD-RTO: 0 /100 WBC
PHOSPHATE SERPL-MCNC: 2.6 MG/DL (ref 2.7–4.5)
PLATELET # BLD AUTO: 197 K/UL (ref 150–450)
PMV BLD AUTO: 11.4 FL (ref 9.2–12.9)
POTASSIUM SERPL-SCNC: 4.1 MMOL/L (ref 3.5–5.1)
PROT SERPL-MCNC: 7.1 G/DL (ref 6–8.4)
RBC # BLD AUTO: 3.07 M/UL (ref 4–5.4)
SODIUM SERPL-SCNC: 133 MMOL/L (ref 136–145)
VANCOMYCIN TROUGH SERPL-MCNC: 14.9 UG/ML (ref 10–22)
WBC # BLD AUTO: 8.35 K/UL (ref 3.9–12.7)

## 2023-06-09 PROCEDURE — 99233 SBSQ HOSP IP/OBS HIGH 50: CPT | Mod: ,,, | Performed by: HOSPITALIST

## 2023-06-09 PROCEDURE — 25000003 PHARM REV CODE 250: Performed by: STUDENT IN AN ORGANIZED HEALTH CARE EDUCATION/TRAINING PROGRAM

## 2023-06-09 PROCEDURE — 97530 THERAPEUTIC ACTIVITIES: CPT

## 2023-06-09 PROCEDURE — 99231 SBSQ HOSP IP/OBS SF/LOW 25: CPT | Mod: ,,, | Performed by: ANESTHESIOLOGY

## 2023-06-09 PROCEDURE — 97165 OT EVAL LOW COMPLEX 30 MIN: CPT

## 2023-06-09 PROCEDURE — 36415 COLL VENOUS BLD VENIPUNCTURE: CPT | Performed by: STUDENT IN AN ORGANIZED HEALTH CARE EDUCATION/TRAINING PROGRAM

## 2023-06-09 PROCEDURE — 11000001 HC ACUTE MED/SURG PRIVATE ROOM

## 2023-06-09 PROCEDURE — 84100 ASSAY OF PHOSPHORUS: CPT | Performed by: STUDENT IN AN ORGANIZED HEALTH CARE EDUCATION/TRAINING PROGRAM

## 2023-06-09 PROCEDURE — 80202 ASSAY OF VANCOMYCIN: CPT | Performed by: STUDENT IN AN ORGANIZED HEALTH CARE EDUCATION/TRAINING PROGRAM

## 2023-06-09 PROCEDURE — 80053 COMPREHEN METABOLIC PANEL: CPT | Performed by: STUDENT IN AN ORGANIZED HEALTH CARE EDUCATION/TRAINING PROGRAM

## 2023-06-09 PROCEDURE — 99233 PR SUBSEQUENT HOSPITAL CARE,LEVL III: ICD-10-PCS | Mod: ,,, | Performed by: HOSPITALIST

## 2023-06-09 PROCEDURE — 63600175 PHARM REV CODE 636 W HCPCS: Performed by: STUDENT IN AN ORGANIZED HEALTH CARE EDUCATION/TRAINING PROGRAM

## 2023-06-09 PROCEDURE — 85025 COMPLETE CBC W/AUTO DIFF WBC: CPT | Performed by: STUDENT IN AN ORGANIZED HEALTH CARE EDUCATION/TRAINING PROGRAM

## 2023-06-09 PROCEDURE — 97161 PT EVAL LOW COMPLEX 20 MIN: CPT

## 2023-06-09 PROCEDURE — 25000003 PHARM REV CODE 250: Performed by: NURSE PRACTITIONER

## 2023-06-09 PROCEDURE — 83735 ASSAY OF MAGNESIUM: CPT | Performed by: STUDENT IN AN ORGANIZED HEALTH CARE EDUCATION/TRAINING PROGRAM

## 2023-06-09 PROCEDURE — 99231 PR SUBSEQUENT HOSPITAL CARE,LEVL I: ICD-10-PCS | Mod: ,,, | Performed by: ANESTHESIOLOGY

## 2023-06-09 PROCEDURE — 97112 NEUROMUSCULAR REEDUCATION: CPT

## 2023-06-09 PROCEDURE — 63600175 PHARM REV CODE 636 W HCPCS

## 2023-06-09 PROCEDURE — 25000003 PHARM REV CODE 250: Performed by: INTERNAL MEDICINE

## 2023-06-09 PROCEDURE — 25000003 PHARM REV CODE 250: Performed by: HOSPITALIST

## 2023-06-09 RX ORDER — TALC
6 POWDER (GRAM) TOPICAL NIGHTLY PRN
Status: DISCONTINUED | OUTPATIENT
Start: 2023-06-09 | End: 2023-06-12 | Stop reason: HOSPADM

## 2023-06-09 RX ADMIN — CEFAZOLIN 2 G: 2 INJECTION, POWDER, FOR SOLUTION INTRAMUSCULAR; INTRAVENOUS at 05:06

## 2023-06-09 RX ADMIN — APIXABAN 2.5 MG: 2.5 TABLET, FILM COATED ORAL at 09:06

## 2023-06-09 RX ADMIN — ROPIVACAINE HYDROCHLORIDE 0.1 ML/HR: 2 INJECTION, SOLUTION EPIDURAL; INFILTRATION at 08:06

## 2023-06-09 RX ADMIN — MUPIROCIN: 20 OINTMENT TOPICAL at 09:06

## 2023-06-09 RX ADMIN — FERROUS SULFATE TAB 325 MG (65 MG ELEMENTAL FE) 1 EACH: 325 (65 FE) TAB at 10:06

## 2023-06-09 RX ADMIN — CHOLECALCIFEROL TAB 25 MCG (1000 UNIT) 1000 UNITS: 25 TAB at 10:06

## 2023-06-09 RX ADMIN — ACETAMINOPHEN 1000 MG: 500 TABLET ORAL at 05:06

## 2023-06-09 RX ADMIN — ACETAMINOPHEN 1000 MG: 500 TABLET ORAL at 09:06

## 2023-06-09 RX ADMIN — Medication 6 MG: at 09:06

## 2023-06-09 RX ADMIN — APIXABAN 2.5 MG: 2.5 TABLET, FILM COATED ORAL at 10:06

## 2023-06-09 RX ADMIN — MUPIROCIN: 20 OINTMENT TOPICAL at 10:06

## 2023-06-09 RX ADMIN — THERA TABS 1 TABLET: TAB at 10:06

## 2023-06-09 RX ADMIN — ACETAMINOPHEN 1000 MG: 500 TABLET ORAL at 01:06

## 2023-06-09 RX ADMIN — LEVETIRACETAM 250 MG: 250 TABLET, FILM COATED ORAL at 09:06

## 2023-06-09 RX ADMIN — METHOCARBAMOL 500 MG: 500 TABLET ORAL at 02:06

## 2023-06-09 RX ADMIN — FERROUS SULFATE TAB 325 MG (65 MG ELEMENTAL FE) 1 EACH: 325 (65 FE) TAB at 09:06

## 2023-06-09 RX ADMIN — MIRTAZAPINE 15 MG: 15 TABLET, FILM COATED ORAL at 09:06

## 2023-06-09 RX ADMIN — SENNOSIDES AND DOCUSATE SODIUM 1 TABLET: 50; 8.6 TABLET ORAL at 09:06

## 2023-06-09 NOTE — ASSESSMENT & PLAN NOTE
Unclear cause as present on admit and on 6/7 overnight. UA negative on admit, CXR without signs of PNA. Procal negative on admit, wbc not elevated CRP elevated but not unexpected given fx so watch closely for reoccurence of fevers as resolved since prior to surgery  -no recent symptoms of infecition per family priro to admit  -if continues to persist post op may have to work up further including viral causes and consider LE US for any non infeciotu causes for persistant fever

## 2023-06-09 NOTE — ASSESSMENT & PLAN NOTE
Sustained in fall and OR on 6/8 for retrograde nail with orthopedics  -PT/OT to start today, family prefers HH and has 24/ care for her at home. Will likely need hospital bed and will f/u PT recs for this  -ropivicaine in place and multimodal management with pain controlled so far post op  NWB x 8 weeks with ROM to RLE, DVT ppx for 6 weeks per ortho, end date July 21st  -given 1 U of PRBCS for anemia on admit with hg 8.2 post op  -bandages to stay on until orthopedics follow up  -bowel regimen

## 2023-06-09 NOTE — PROGRESS NOTES
[facility]  Orthopedics  Progress Note    Attg Note:  I agree with the resident's assessment and plan.    Nicolas Min MD      Patient Name: Ally Steiner  MRN: [id number]  Admission Date: 6/7/2023  Hospital Length of Stay: 2 days  Attending Provider: Viki Mcneill MD  Primary Care Provider: Josh Fajardo MD  Follow-up For: Procedure(s) (LRB):  INSERTION, INTRAMEDULLARY FARHAT, FEMUR, DISTAL, RETROGRADE - right. trios, synthes, c arm door side (Right)    Post-Operative Day: 1 Day Post-Op  Subjective:     Principal Problem:Periprosthetic fracture around internal prosthetic joint    Principal Orthopedic Problem: same    Interval History: NAEON. Family is bedside and states patient doing well and at her baseline. She is smiling and playing w her stuffed animal which she calls her baby.Denies any pain.    Review of patient's allergies indicates:  No Known Allergies    Current Facility-Administered Medications   Medication    0.9%  NaCl infusion (for blood administration)    acetaminophen tablet 1,000 mg    apixaban tablet 2.5 mg    dextrose 10% bolus 125 mL 125 mL    dextrose 10% bolus 250 mL 250 mL    dextrose 40 % gel 15,000 mg    dextrose 40 % gel 30,000 mg    ferrous sulfate tablet 1 each    glucagon (human recombinant) injection 1 mg    levETIRAcetam tablet 250 mg    methocarbamoL tablet 500 mg    mirtazapine tablet 15 mg    multivitamin tablet    mupirocin 2 % ointment    naloxone 0.4 mg/mL injection 0.02 mg    ondansetron injection 4 mg    ROPIvacaine (PF) 2 mg/ml (0.2%) solution    senna-docusate 8.6-50 mg per tablet 1 tablet    sodium chloride 0.9% flush 10 mL    vitamin D 1000 units tablet 1,000 Units     Objective:     Vital Signs (Most Recent):  Temp: 98.1 °F (36.7 °C) (06/09/23 1140)  Pulse: 76 (06/09/23 1140)  Resp: 17 (06/09/23 1140)  BP: (!) 151/68 (06/09/23 1140)  SpO2: 95 % (06/09/23 1140) Vital Signs (24h Range):  Temp:  [95.4 °F (35.2 °C)-99.8 °F (37.7 °C)] 98.1 °F (36.7  °C)  Pulse:  [76-92] 76  Resp:  [17-20] 17  SpO2:  [95 %-100 %] 95 %  BP: (126-158)/(60-81) 151/68     Weight: 49.8 kg (109 lb 12.6 oz)  Height: 5' (152.4 cm)  Body mass index is 21.44 kg/m².      Intake/Output Summary (Last 24 hours) at 6/9/2023 1152  Last data filed at 6/9/2023 1004  Gross per 24 hour   Intake 180 ml   Output 1200 ml   Net -1020 ml         Ortho/SPM Exam  Alert, confused    LLE  Surgical dressings at thigh cdi  Grossly NVI, dp pulse 2+     Significant Labs: BMP:   Recent Labs   Lab 06/09/23  0351   *   *   K 4.1      CO2 23   BUN 15   CREATININE 1.3   CALCIUM 9.1   MG 1.6       CBC:   Recent Labs   Lab 06/08/23  0528 06/09/23  0351   WBC 7.93 8.35   HGB 9.6* 8.2*   HCT 29.8* 25.3*    197       CMP:   Recent Labs   Lab 06/08/23  0528 06/09/23  0351   * 133*   K 4.1 4.1    101   CO2 24 23   * 120*   BUN 16 15   CREATININE 1.1 1.3   CALCIUM 9.1 9.1   PROT 7.2 7.1   ALBUMIN 3.1* 2.9*   BILITOT 0.9 0.4   ALKPHOS 102 89   AST 15 24   ALT 8* 8*   ANIONGAP 9 9       All pertinent labs within the past 24 hours have been reviewed.    Significant Imaging: I have reviewed and interpreted all pertinent imaging results/findings.    Assessment/Plan:     * Periprosthetic fracture of the right distal femur  Patient is a 83-year-old female who presents with a right distal femur periprosthetic fracture after a ground level fall 6/6/23  She ambulates independently in her home but uses a wheelchair for distances due to dementia.  She has a past medical history significant only for dementia.  She takes no anticoagulation at home.  She lives with her daughter.      Sp operative fixation 6/8/23 w Dr Min    recs  Multimodal pain regimen  TTWB RLE  PT/OT daily  SCDs, eliquis 2.5 bid for DVT ppx  Moniotor hb, 8.2 today from 9.6 preoperatively  ELKE WHIPPLE 2 wks w ortho          Cristiane Martell MD  Orthopedics  [facility] - Surgery   DISPLAY PLAN FREE TEXT

## 2023-06-09 NOTE — PLAN OF CARE
Plan of Care:  PT evaluation completed- see note for details. Goals and POC established.     2023    Physical Therapy Treatment Goals:  Multidisciplinary Problems       Physical Therapy Goals          Problem: Physical Therapy    Goal Priority Disciplines Outcome Goal Variances Interventions   Physical Therapy Goal     PT, PT/OT Ongoing, Progressing     Description: Goals to be met by: 23     Patient will increase functional independence with mobility by performin. Supine to sit with Stand-by Assistance  2. Sit to supine with Stand-by Assistance  3. Sit to stand transfer with contact guard Assistance within orthopedic precautions for RLE.   4. Gait  x 10 feet with Minimal Assistance using LRAD within orthopedic precautions for RLE   5. Ascend/descend 4 stairs with Handrails as needed with minimal assistance within orthopedic precautions for RLE   6. Lower extremity exercise program x20 reps per handout, with assistance as needed   7. Pt will complete bed<>wheelchair transfer with contact guard assistance

## 2023-06-09 NOTE — NURSING
Pt did not sleep last night, she has been restless, confused, denies pain, son requesting medication for sleep.

## 2023-06-09 NOTE — ASSESSMENT & PLAN NOTE
Patient is a 83-year-old female who presents with a right distal femur periprosthetic fracture after a ground level fall 6/6/23  She ambulates independently in her home but uses a wheelchair for distances due to dementia.  She has a past medical history significant only for dementia.  She takes no anticoagulation at home.  She lives with her daughter.      Sp operative fixation 6/8/23 w Dr Min    recs  Multimodal pain regimen  WBAT RLE  PT/OT daily  SCDs, eliquis 2.5 bid for DVT ppx  Moniotor hb, 8.2 today from 9.6 preoperatively  ELKE WHIPPLE 2 wks w ortho

## 2023-06-09 NOTE — ANESTHESIA POST-OP PAIN MANAGEMENT
Acute Pain Service Progress Note    Ally Steiner is a 83 y.o., female, [id number].    Surgery:  INSERTION, INTRAMEDULLARY FARHAT, FEMUR, DISTAL, RETROGRADE - right. trios, synthes, c arm door side (Right: Leg Upper)    Post Op Day #: 1    Catheter type: perineural SIFI    Infusion type: Ropivacaine 0.2% 0.1 mL/hr + 15mL q3hr    Problem List:    Active Hospital Problems    Diagnosis  POA    *Periprosthetic fracture of the right distal femur [M97.9XXA]  Yes    Iron deficiency anemia [D50.9]  Yes    Fever [R50.9]  Yes    Acute blood loss anemia [D62]  Yes    Anemia of chronic disease [D63.8]  Yes    Closed fracture of right distal femur [S72.401A]  Yes    Seizure disorder [G40.909]  Yes    Stage 3a chronic kidney disease [N18.31]  Yes     Reviewed labs, plan for more labs next visit.       Essential hypertension [I10]  Yes     Diet controlled. bp well controlled today.       Late onset Alzheimer's disease with behavioral disturbance [G30.1, F02.818]  Yes     Established with neuropsych. Due for follow up ~10/2021. Family helps with ADLs. Does not drive        Resolved Hospital Problems    Diagnosis Date Resolved POA    Preop cardiovascular exam [Z01.810] 06/08/2023 Not Applicable       Subjective:     General appearance of alert, oriented, no complaints   Pain with rest: 1    Numbers   Pain with movement: 4    Numbers   Side Effects    1. Pruritis No    2. Nausea No    3. Motor Blockade No, 0=Ability to raise lower extremities off bed    4. Sedation No, S=sleep, easy to arouse    Objective:        Vitals   Vitals:    06/09/23 1140   BP: (!) 151/68   Pulse: 76   Resp: 17   Temp: 36.7 °C (98.1 °F)        Labs    Admission on 06/07/2023   Component Date Value Ref Range Status    HIV 1/2 Ag/Ab 06/07/2023 Non-reactive  Non-reactive Final    Hepatitis C Ab 06/07/2023 Non-reactive  Non-reactive Final    Transferrin 06/07/2023 262  200 - 375 mg/dL Final    Prealbumin 06/07/2023 18 (L)  20 - 43 mg/dL Final     Procalcitonin 06/07/2023 0.02  <0.25 ng/mL Final    Magnesium 06/07/2023 1.6  1.6 - 2.6 mg/dL Final    Phosphorus 06/07/2023 3.2  2.7 - 4.5 mg/dL Final    Hemoglobin A1C 06/07/2023 5.6  4.0 - 5.6 % Final    Estimated Avg Glucose 06/07/2023 114  68 - 131 mg/dL Final    Specimen UA 06/07/2023 Urine, Catheterized   Final    Color, UA 06/07/2023 Yellow  Yellow, Straw, Linda Final    Appearance, UA 06/07/2023 Clear  Clear Final    pH, UA 06/07/2023 5.0  5.0 - 8.0 Final    Specific [place], UA 06/07/2023 1.015  1.005 - 1.030 Final    Protein, UA 06/07/2023 Negative  Negative Final    Glucose, UA 06/07/2023 Negative  Negative Final    Ketones, UA 06/07/2023 Negative  Negative Final    Bilirubin (UA) 06/07/2023 Negative  Negative Final    Occult Blood UA 06/07/2023 Negative  Negative Final    Nitrite, UA 06/07/2023 Negative  Negative Final    Leukocytes, UA 06/07/2023 1+ (A)  Negative Final    Vit D, 25-Hydroxy 06/07/2023 27 (L)  30 - 96 ng/mL Final    Group & Rh 06/07/2023 B POS   Final    Indirect [place] 06/07/2023 NEG   Final    Specimen Outdate 06/07/2023 06/10/2023 23:59   Final    Sodium 06/07/2023 138  136 - 145 mmol/L Final    Potassium 06/07/2023 4.6  3.5 - 5.1 mmol/L Final    Chloride 06/07/2023 104  95 - 110 mmol/L Final    CO2 06/07/2023 25  23 - 29 mmol/L Final    Glucose 06/07/2023 136 (H)  70 - 110 mg/dL Final    BUN 06/07/2023 16  8 - 23 mg/dL Final    Creatinine 06/07/2023 1.4  0.5 - 1.4 mg/dL Final    Calcium 06/07/2023 9.2  8.7 - 10.5 mg/dL Final    Total Protein 06/07/2023 7.2  6.0 - 8.4 g/dL Final    Albumin 06/07/2023 3.3 (L)  3.5 - 5.2 g/dL Final    Total Bilirubin 06/07/2023 0.5  0.1 - 1.0 mg/dL Final    Alkaline Phosphatase 06/07/2023 99  55 - 135 U/L Final    AST 06/07/2023 13  10 - 40 U/L Final    ALT 06/07/2023 8 (L)  10 - 44 U/L Final    Anion Gap 06/07/2023 9  8 - 16 mmol/L Final    eGFR 06/07/2023 37.6 (A)  >60 mL/min/1.73 m^2 Final    Magnesium  06/07/2023 1.6  1.6 - 2.6 mg/dL Final    Phosphorus 06/07/2023 3.2  2.7 - 4.5 mg/dL Final    WBC 06/07/2023 8.58  3.90 - 12.70 K/uL Final    RBC 06/07/2023 3.02 (L)  4.00 - 5.40 M/uL Final    Hemoglobin 06/07/2023 7.7 (L)  12.0 - 16.0 g/dL Final    Hematocrit 06/07/2023 24.8 (L)  37.0 - 48.5 % Final    MCV 06/07/2023 82  82 - 98 fL Final    MCH 06/07/2023 25.5 (L)  27.0 - 31.0 pg Final    MCHC 06/07/2023 31.0 (L)  32.0 - 36.0 g/dL Final    RDW 06/07/2023 15.1 (H)  11.5 - 14.5 % Final    Platelets 06/07/2023 246  150 - 450 K/uL Final    MPV 06/07/2023 11.5  9.2 - 12.9 fL Final    Immature Granulocytes 06/07/2023 0.2  0.0 - 0.5 % Final    Gran # (ANC) 06/07/2023 7.0  1.8 - 7.7 K/uL Final    Immature Grans (Abs) 06/07/2023 0.02  0.00 - 0.04 K/uL Final    Lymph # 06/07/2023 1.1  1.0 - 4.8 K/uL Final    Mono # 06/07/2023 0.5  0.3 - 1.0 K/uL Final    Eos # 06/07/2023 0.0  0.0 - 0.5 K/uL Final    Baso # 06/07/2023 0.01  0.00 - 0.20 K/uL Final    nRBC 06/07/2023 0  0 /100 WBC Final    Gran % 06/07/2023 81.8 (H)  38.0 - 73.0 % Final    Lymph % 06/07/2023 12.2 (L)  18.0 - 48.0 % Final    Mono % 06/07/2023 5.6  4.0 - 15.0 % Final    Eosinophil % 06/07/2023 0.1  0.0 - 8.0 % Final    Basophil % 06/07/2023 0.1  0.0 - 1.9 % Final    Differential Method 06/07/2023 Automated   Final    Iron 06/07/2023 37  30 - 160 ug/dL Final    Transferrin 06/07/2023 252  200 - 375 mg/dL Final    TIBC 06/07/2023 373  250 - 450 ug/dL Final    Saturated Iron 06/07/2023 10 (L)  20 - 50 % Final    Ferritin 06/07/2023 26  20.0 - 300.0 ng/mL Final    RBC, UA 06/07/2023 8 (H)  0 - 4 /hpf Final    WBC, UA 06/07/2023 3  0 - 5 /hpf Final    Bacteria 06/07/2023 Rare  None-Occ /hpf Final    Squam Epithel, UA 06/07/2023 1  /hpf Final    Hyaline Casts, UA 06/07/2023 3 (A)  0-1/lpf /lpf Final    Microscopic Comment 06/07/2023 SEE COMMENT   Final    BNP 06/07/2023 107 (H)  0 - 99 pg/mL Final    Troponin I 06/07/2023 0.006   0.000 - 0.026 ng/mL Final    POCT Glucose 06/07/2023 141 (H)  70 - 110 mg/dL Final    UNIT NUMBER 06/07/2023 [id number]   Final    Product Code 06/07/2023 [id number]   Final    DISPENSE STATUS 06/07/2023 TRANSFUSED   Final    CODING SYSTEM 06/07/2023 [id number]   Final    Unit Blood Type Code 06/07/2023 7300   Final    Unit Blood Type 06/07/2023 B POS   Final    Unit Expiration 06/07/2023 [id number]   Final    CROSSMATCH INTERPRETATION 06/07/2023 Compatible   Final    UNIT NUMBER 06/07/2023 [id number]   Preliminary    Product Code 06/07/2023 [id number]   Preliminary    DISPENSE STATUS 06/07/2023 CROSSMATCHED   Preliminary    CODING SYSTEM 06/07/2023 [id number]   Preliminary    Unit Blood Type Code 06/07/2023 7300   Preliminary    Unit Blood Type 06/07/2023 B POS   Preliminary    Unit Expiration 06/07/2023 [id number]   Preliminary    CROSSMATCH INTERPRETATION 06/07/2023 Compatible   Preliminary    POCT Glucose 06/07/2023 170 (H)  70 - 110 mg/dL Final    Sodium 06/08/2023 134 (L)  136 - 145 mmol/L Final    Potassium 06/08/2023 4.1  3.5 - 5.1 mmol/L Final    Chloride 06/08/2023 101  95 - 110 mmol/L Final    CO2 06/08/2023 24  23 - 29 mmol/L Final    Glucose 06/08/2023 122 (H)  70 - 110 mg/dL Final    BUN 06/08/2023 16  8 - 23 mg/dL Final    Creatinine 06/08/2023 1.1  0.5 - 1.4 mg/dL Final    Calcium 06/08/2023 9.1  8.7 - 10.5 mg/dL Final    Total Protein 06/08/2023 7.2  6.0 - 8.4 g/dL Final    Albumin 06/08/2023 3.1 (L)  3.5 - 5.2 g/dL Final    Total Bilirubin 06/08/2023 0.9  0.1 - 1.0 mg/dL Final    Alkaline Phosphatase 06/08/2023 102  55 - 135 U/L Final    AST 06/08/2023 15  10 - 40 U/L Final    ALT 06/08/2023 8 (L)  10 - 44 U/L Final    Anion Gap 06/08/2023 9  8 - 16 mmol/L Final    eGFR 06/08/2023 50.2 (A)  >60 mL/min/1.73 m^2 Final    Magnesium 06/08/2023 1.9  1.6 - 2.6 mg/dL Final    Phosphorus 06/08/2023 2.6 (L)  2.7 - 4.5 mg/dL Final    WBC 06/08/2023 7.93  3.90 - 12.70 K/uL  Final    RBC 06/08/2023 3.62 (L)  4.00 - 5.40 M/uL Final    Hemoglobin 06/08/2023 9.6 (L)  12.0 - 16.0 g/dL Final    Hematocrit 06/08/2023 29.8 (L)  37.0 - 48.5 % Final    MCV 06/08/2023 82  82 - 98 fL Final    MCH 06/08/2023 26.5 (L)  27.0 - 31.0 pg Final    MCHC 06/08/2023 32.2  32.0 - 36.0 g/dL Final    RDW 06/08/2023 14.7 (H)  11.5 - 14.5 % Final    Platelets 06/08/2023 202  150 - 450 K/uL Final    MPV 06/08/2023 11.7  9.2 - 12.9 fL Final    Immature Granulocytes 06/08/2023 0.6 (H)  0.0 - 0.5 % Final    Gran # (ANC) 06/08/2023 5.6  1.8 - 7.7 K/uL Final    Immature Grans (Abs) 06/08/2023 0.05 (H)  0.00 - 0.04 K/uL Final    Lymph # 06/08/2023 1.3  1.0 - 4.8 K/uL Final    Mono # 06/08/2023 0.7  0.3 - 1.0 K/uL Final    Eos # 06/08/2023 0.3  0.0 - 0.5 K/uL Final    Baso # 06/08/2023 0.03  0.00 - 0.20 K/uL Final    nRBC 06/08/2023 0  0 /100 WBC Final    Gran % 06/08/2023 70.0  38.0 - 73.0 % Final    Lymph % 06/08/2023 16.8 (L)  18.0 - 48.0 % Final    Mono % 06/08/2023 8.4  4.0 - 15.0 % Final    Eosinophil % 06/08/2023 3.8  0.0 - 8.0 % Final    Basophil % 06/08/2023 0.4  0.0 - 1.9 % Final    Differential Method 06/08/2023 Automated   Final    CRP 06/08/2023 108.3 (H)  0.0 - 8.2 mg/L Final    Sodium 06/09/2023 133 (L)  136 - 145 mmol/L Final    Potassium 06/09/2023 4.1  3.5 - 5.1 mmol/L Final    Chloride 06/09/2023 101  95 - 110 mmol/L Final    CO2 06/09/2023 23  23 - 29 mmol/L Final    Glucose 06/09/2023 120 (H)  70 - 110 mg/dL Final    BUN 06/09/2023 15  8 - 23 mg/dL Final    Creatinine 06/09/2023 1.3  0.5 - 1.4 mg/dL Final    Calcium 06/09/2023 9.1  8.7 - 10.5 mg/dL Final    Total Protein 06/09/2023 7.1  6.0 - 8.4 g/dL Final    Albumin 06/09/2023 2.9 (L)  3.5 - 5.2 g/dL Final    Total Bilirubin 06/09/2023 0.4  0.1 - 1.0 mg/dL Final    Alkaline Phosphatase 06/09/2023 89  55 - 135 U/L Final    AST 06/09/2023 24  10 - 40 U/L Final    ALT 06/09/2023 8 (L)  10 - 44 U/L Final    Anion  Gap 06/09/2023 9  8 - 16 mmol/L Final    eGFR 06/09/2023 41.1 (A)  >60 mL/min/1.73 m^2 Final    Magnesium 06/09/2023 1.6  1.6 - 2.6 mg/dL Final    Phosphorus 06/09/2023 2.6 (L)  2.7 - 4.5 mg/dL Final    WBC 06/09/2023 8.35  3.90 - 12.70 K/uL Final    RBC 06/09/2023 3.07 (L)  4.00 - 5.40 M/uL Final    Hemoglobin 06/09/2023 8.2 (L)  12.0 - 16.0 g/dL Final    Hematocrit 06/09/2023 25.3 (L)  37.0 - 48.5 % Final    MCV 06/09/2023 82  82 - 98 fL Final    MCH 06/09/2023 26.7 (L)  27.0 - 31.0 pg Final    MCHC 06/09/2023 32.4  32.0 - 36.0 g/dL Final    RDW 06/09/2023 15.3 (H)  11.5 - 14.5 % Final    Platelets 06/09/2023 197  150 - 450 K/uL Final    MPV 06/09/2023 11.4  9.2 - 12.9 fL Final    Immature Granulocytes 06/09/2023 0.6 (H)  0.0 - 0.5 % Final    Gran # (ANC) 06/09/2023 6.0  1.8 - 7.7 K/uL Final    Immature Grans (Abs) 06/09/2023 0.05 (H)  0.00 - 0.04 K/uL Final    Lymph # 06/09/2023 1.2  1.0 - 4.8 K/uL Final    Mono # 06/09/2023 1.0  0.3 - 1.0 K/uL Final    Eos # 06/09/2023 0.1  0.0 - 0.5 K/uL Final    Baso # 06/09/2023 0.01  0.00 - 0.20 K/uL Final    nRBC 06/09/2023 0  0 /100 WBC Final    Gran % 06/09/2023 72.2  38.0 - 73.0 % Final    Lymph % 06/09/2023 14.6 (L)  18.0 - 48.0 % Final    Mono % 06/09/2023 11.4  4.0 - 15.0 % Final    Eosinophil % 06/09/2023 1.1  0.0 - 8.0 % Final    Basophil % 06/09/2023 0.1  0.0 - 1.9 % Final    Differential Method 06/09/2023 Automated   Final    Vancomycin-Trough 06/09/2023 14.9  10.0 - 22.0 ug/mL Final        Meds   Current Facility-Administered Medications   Medication Dose Route Frequency Provider Last Rate Last Admin    0.9%  NaCl infusion (for blood administration)   Intravenous Q24H PRN Cristiane Martell MD        acetaminophen tablet 1,000 mg  1,000 mg Oral Q8H Sergio Mao MD   1,000 mg at 06/09/23 0514    apixaban tablet 2.5 mg  2.5 mg Oral BID Cristiane Martell MD   2.5 mg at 06/09/23 1000    dextrose 10% bolus 125 mL 125 mL  12.5 g  Intravenous PRN Carlos Bess MD        dextrose 10% bolus 250 mL 250 mL  25 g Intravenous PRN Carlos Bess MD        dextrose 40 % gel 15,000 mg  15 g Oral PRN Carlos Bess MD        dextrose 40 % gel 30,000 mg  30 g Oral PRN Carlos Bess MD        ferrous sulfate tablet 1 each  1 tablet Oral BID Viki Mcneill MD   1 each at 06/09/23 1000    glucagon (human recombinant) injection 1 mg  1 mg Intramuscular PRN Carlos Bess MD        levETIRAcetam tablet 250 mg  250 mg Oral Nightly Carlos Bess MD   250 mg at 06/08/23 2118    methocarbamoL tablet 500 mg  500 mg Oral Q6H PRN Sergio Moa MD   500 mg at 06/09/23 0208    mirtazapine tablet 15 mg  15 mg Oral QHS Carlos Bess MD   15 mg at 06/08/23 2119    multivitamin tablet  1 tablet Oral Daily Carlos Bess MD   1 tablet at 06/09/23 1000    mupirocin 2 % ointment   Nasal BID Maria Guadalupe Fang MD   Given at 06/09/23 1001    naloxone 0.4 mg/mL injection 0.02 mg  0.02 mg Intravenous PRN Carlos Bess MD        ondansetron injection 4 mg  4 mg Intravenous Q8H PRN Carlos Bess MD   4 mg at 06/08/23 0949    ROPIvacaine (PF) 2 mg/ml (0.2%) solution  0.1 mL/hr Perineural Continuous Moira Kolb MD 0.1 mL/hr at 06/08/23 1048 0.1 mL/hr at 06/08/23 1048    senna-docusate 8.6-50 mg per tablet 1 tablet  1 tablet Oral Daily PRN Nessa Sanchez MD   1 tablet at 06/08/23 2119    sodium chloride 0.9% flush 10 mL  10 mL Intravenous Q12H PRN Carlos Bess MD        vitamin D 1000 units tablet 1,000 Units  1,000 Units Oral Daily Maria Guadalupe Fang MD   1,000 Units at 06/09/23 1000        Anticoagulant dose Eliquis 2.5 mg BID    Assessment:     Pain control adequate    Plan:     Patient doing well, continue present treatment.    1) Continue PNC  2) Continue tylenol 1gq8hr  3) Continue Robaxin 500 mg q6hr PRN    We will follow up with patient.    Chester Song MD, PGY-3  Department of Anesthesiology  [facility]  06/09/2023 12:28 PM

## 2023-06-09 NOTE — PLAN OF CARE
Problem: Occupational Therapy  Goal: Occupational Therapy Goal  Description: Goals to be met by: 6/19/23     Patient will increase functional independence with ADLs by performing:    UE Dressing with Moderate Assistance.  Grooming while EOB with Minimal Assistance.  Toileting from bedside commode with Minimal Assistance for hygiene and clothing management.   Stand pivot transfers with Minimal Assistance.  Squat pivot transfers with Minimal Assistance.  Step transfer with Minimal Assistance    Outcome: Ongoing, Progressing

## 2023-06-09 NOTE — SUBJECTIVE & OBJECTIVE
Principal Problem:Periprosthetic fracture around internal prosthetic joint    Principal Orthopedic Problem: same    Interval History: NEO. Family is bedside and states patient doing well and at her baseline. She is smiling and playing w her stuffed animal which she calls her baby.Denies any pain.    Review of patient's allergies indicates:  No Known Allergies    Current Facility-Administered Medications   Medication    0.9%  NaCl infusion (for blood administration)    acetaminophen tablet 1,000 mg    apixaban tablet 2.5 mg    dextrose 10% bolus 125 mL 125 mL    dextrose 10% bolus 250 mL 250 mL    dextrose 40 % gel 15,000 mg    dextrose 40 % gel 30,000 mg    ferrous sulfate tablet 1 each    glucagon (human recombinant) injection 1 mg    levETIRAcetam tablet 250 mg    methocarbamoL tablet 500 mg    mirtazapine tablet 15 mg    multivitamin tablet    mupirocin 2 % ointment    naloxone 0.4 mg/mL injection 0.02 mg    ondansetron injection 4 mg    ROPIvacaine (PF) 2 mg/ml (0.2%) solution    senna-docusate 8.6-50 mg per tablet 1 tablet    sodium chloride 0.9% flush 10 mL    vitamin D 1000 units tablet 1,000 Units     Objective:     Vital Signs (Most Recent):  Temp: 98.1 °F (36.7 °C) (06/09/23 1140)  Pulse: 76 (06/09/23 1140)  Resp: 17 (06/09/23 1140)  BP: (!) 151/68 (06/09/23 1140)  SpO2: 95 % (06/09/23 1140) Vital Signs (24h Range):  Temp:  [95.4 °F (35.2 °C)-99.8 °F (37.7 °C)] 98.1 °F (36.7 °C)  Pulse:  [76-92] 76  Resp:  [17-20] 17  SpO2:  [95 %-100 %] 95 %  BP: (126-158)/(60-81) 151/68     Weight: 49.8 kg (109 lb 12.6 oz)  Height: 5' (152.4 cm)  Body mass index is 21.44 kg/m².      Intake/Output Summary (Last 24 hours) at 6/9/2023 1152  Last data filed at 6/9/2023 1004  Gross per 24 hour   Intake 180 ml   Output 1200 ml   Net -1020 ml          Ortho/SPM Exam  Alert, confused    LLE  Surgical dressings at thigh cdi  Grossly NVI, dp pulse 2+     Significant Labs: BMP:   Recent Labs   Lab 06/09/23  0351   *   NA  133*   K 4.1      CO2 23   BUN 15   CREATININE 1.3   CALCIUM 9.1   MG 1.6       CBC:   Recent Labs   Lab 06/08/23  0528 06/09/23  0351   WBC 7.93 8.35   HGB 9.6* 8.2*   HCT 29.8* 25.3*    197       CMP:   Recent Labs   Lab 06/08/23  0528 06/09/23  0351   * 133*   K 4.1 4.1    101   CO2 24 23   * 120*   BUN 16 15   CREATININE 1.1 1.3   CALCIUM 9.1 9.1   PROT 7.2 7.1   ALBUMIN 3.1* 2.9*   BILITOT 0.9 0.4   ALKPHOS 102 89   AST 15 24   ALT 8* 8*   ANIONGAP 9 9       All pertinent labs within the past 24 hours have been reviewed.    Significant Imaging: I have reviewed and interpreted all pertinent imaging results/findings.

## 2023-06-09 NOTE — ASSESSMENT & PLAN NOTE
Baseline dementia and has family assistance at home. Family at bedside during exam 6/8  Pleasant on exam, alert with mild confusion at times with redirection per family

## 2023-06-09 NOTE — ASSESSMENT & PLAN NOTE
expcted with fracture and inflammation on top of chronic anemia, given 1 U PRBCS prior to surgery given hg 7.7. and continue to closely monitor-->8.2 now

## 2023-06-09 NOTE — PT/OT/SLP EVAL
Physical Therapy Co-Evaluation with OT and Treatment     Patient Name:  Ally Steiner  MRN: [id number]    Admit Date: 6/7/2023  Admitting Diagnosis:  Periprosthetic fracture around internal prosthetic joint  Length of Stay: 2 days  Recent Surgery: Procedure(s) (LRB):  INSERTION, INTRAMEDULLARY FARHAT, FEMUR, DISTAL, RETROGRADE - right. trios, synthes, c arm door side (Right) 1 Day Post-Op    Recommendations:     Discharge Recommendations: Skilled Nursing Facility   Equipment recommendations:  TBD  Barriers to discharge: Inaccessible home and Increased level of skilled assistance required    Assessment:     Ally Steiner is a 83 y.o. female admitted to [facility] on 6/7/2023 with medical diagnosis of Periprosthetic fracture around internal prosthetic joint. Pt presents with weakness, impaired endurance, impaired self care skills, impaired functional mobility, gait instability, impaired balance, decreased lower extremity function, pain, orthopedic precautions. These deficits effect their roles and responsibilities in which they were able to complete prior to admit. Ally Steiner would benefit from acute PT intervention to improve quality of life, focus on recovery of impairments, provide patient/caregiver education, reduce fall risk, and maximize (I) and safety with functional mobility.     Rehab Prognosis: Good    Plan:     During this hospitalization, patient to be seen 4 x/week to address the identified rehab impairments via gait training, therapeutic activities, therapeutic exercises, neuromuscular re-education, wheelchair management/training and progress towards stated goals.     Plan of Care Expires:  07/09/23  Plan of Care reviewed with: patient, daughter    This plan of care has been discussed with the patient/caregiver, who was included in its development and is in agreement with the identified goals and treatment plan.     Subjective     Communicated with RN prior to session.  Patient found HOB  "elevated upon PT entry to room, agreeable to evaluation. Pt's daughters present during session.    Chief Complaint: Transfer and Leg Injury (From [facility], needs ortho for R femur fx)    Patient/Family Comments/goals: "I clean up around the house" "I like to sit on the porch and talk"     Pain/Comfort:  Pain Rating 1: 3/10  Location - Side 1: Right  Location 1: leg  Pain Addressed 1: Reposition, Distraction, Nurse notified  Pain Rating Post-Intervention 1:  (did not rate)    Patients cultural, spiritual, Uatsdin conflicts given the current situation: None identified     Patient History: information obtained from pt      Living Environment: Pt lives with daughter in single level home  with 4 SHITAL, L HR available. Bathroom set-up: walk-in shower  Prior Level of Function: independent with mobility and required some assist for ADLs  DME owned: bedside commode, wheelchair, scooter, and rollator  Support Available/Caregiver Assistance:  assist from daughters as needed    Objective:     Patient found with: SCD, FCD, PureWick, perineural catheter, peripheral IV    Recent Surgery: Procedure(s) (LRB):  INSERTION, INTRAMEDULLARY AFRHAT, FEMUR, DISTAL, RETROGRADE - right. trios, synthes, c arm door side (Right) 1 Day Post-Op  General Precautions: Standard, fall   Orthopedic Precautions: RLE NWB (clarified WB precautions with Dr. Mcneill)   Braces: N/A   Oxygen Device: room air    Exams:    Cognition:  Alert, oriented to person ; pt is pleasantly confused   Command following: Follows one-step verbal commands, and easily distracted  Communication: clear/fluent    Sensation:   Light touch sensation: Intact BLEs    Edema/Skin Integrity: None noted; Visible skin intact    Postural examination/scapula alignment: Rounded shoulder    Lower Extremity Range of Motion:  Right Lower Extremity:  WFL at ankle; knee and hip N/T 2/2 pain   Left Lower Extremity: WFL    Lower Extremity Strength:  Right Lower Extremity:  N/T 2/2 pain   Left " Lower Extremity: 4/5    Functional Mobility:    Bed Mobility:  Scooting with minimal assistance  Supine > Sit with minimal assistance    Transfers:   Sit <> Stand Transfer: Minimal Assistance of 2 persons x 3 trials from EOB (2 trials performed with RW, 1 trial performed with no AD) . Pt required assist of 1 person to clear hips from EOB, and assist from additional therapist to maintain RLE NWB)   Bed > Chair Stand Pivot Transfer:  Moderate Assistance of 2 persons with no AD              Gait:  Unable to perform; pt unable to maintain RLE NWB without therapist physically holding RLE off of floor. Gait deferred to maintain pt safety.     Balance:  Dynamic Sitting: FAIR+: Maintains balance through MINIMAL excursions of active trunk motion  Standing:  Static: 0: Needs MAXIMAL assist to maintain        Outcome Measure: AM-PAC 6 CLICK MOBILITY  Total Score:13     Patient/Caregiver Education:     Therapist educated pt/caregiver regarding:   PT POC and goals for therapy   Orthopedic precautions   Transfer training   Safety with mobility and fall risk   Instruction on use of call button and importance of calling nursing staff for assistance with mobility   Time provided for therapeutic counseling and discussion of current health disposition. All questions answered to satisfaction, within scope of PT practice     Patient/caregiver able to verbalize understanding and expressed no further questions this visit; will follow-up with pt/caregiver during current admit for additional questions/concerns within scope of practice.     White board updated.     Patient left up in chair with all lines intact, call button in reach, and RN notified.    GOALS:   Multidisciplinary Problems       Physical Therapy Goals          Problem: Physical Therapy    Goal Priority Disciplines Outcome Goal Variances Interventions   Physical Therapy Goal     PT, PT/OT Ongoing, Progressing     Description: Goals to be met by: 6/23/23     Patient will  increase functional independence with mobility by performin. Supine to sit with Stand-by Assistance  2. Sit to supine with Stand-by Assistance  3. Sit to stand transfer with contact guard Assistance within orthopedic precautions for RLE.   4. Gait  x 10 feet with Minimal Assistance using LRAD within orthopedic precautions for RLE   5. Ascend/descend 4 stairs with Handrails as needed with minimal assistance within orthopedic precautions for RLE   6. Lower extremity exercise program x20 reps per handout, with assistance as needed   7. Pt will complete bed<>wheelchair transfer with contact guard assistance                            History:     Past Medical History:   Diagnosis Date    Essential hypertension     Diet controlled. bp well controlled today.     History of hypertension     always been able to manage with diet    Hx of diabetes mellitus     Always controlled with diet and weight loss    Hyperlipidemia     always been able to manage with diet    Late onset Alzheimer's disease with behavioral disturbance 2020    Established with neuropsych. Due for follow up ~10/2021. Family helps with ADLs. Does not drive    Memory loss     Osteoarthritis     Pure hypercholesterolemia     always been able to manage with diet    Seizure disorder 2022    Stage 3a chronic kidney disease 2021    Reviewed labs, plan for more labs next visit.        Past Surgical History:   Procedure Laterality Date    CHOLECYSTECTOMY      Hysterectomy      KNEE SURGERY Left     knee replacement    KNEE SURGERY Right     knee replacement    OPEN REDUCTION OF FRACTURE OF MANDIBLE Left 2021    Procedure: OPEN REDUCTION, FRACTURE, MANDIBLE;  Surgeon: Dar Mcgill MD;  Location: [facility] OR [address] [place] [address];  Service: ENT;  Laterality: Left;    REDUCTION OF BOTH BREASTS Bilateral     RETROGRADE INTRAMEDULLARY RODDING OF DISTAL FEMUR Right 2023    Procedure: INSERTION, INTRAMEDULLARY FARHAT, FEMUR, DISTAL, RETROGRADE - right.  trios, synthes, c arm door side;  Surgeon: Nicolas Min MD;  Location: [facility] OR [address] [place] [address];  Service: Orthopedics;  Laterality: Right;       Time Tracking:     PT Received On: 06/09/23  PT Start Time: 1025     PT Stop Time: 1103  PT Total Time (min): 38 min     Billable Minutes: Evaluation 8 min min, Therapeutic Activity 15 min, and Neuromuscular Re-education 15 min    06/09/2023

## 2023-06-09 NOTE — PROGRESS NOTES
[facility] - Surgery  [facility] Medicine  Progress Note    Patient Name: Ally Steiner  MRN: [id number]  Patient Class: IP- Inpatient   Admission Date: 6/7/2023  Length of Stay: 2 days  Attending Physician: Viki Mcneill MD  Primary Care Provider: Josh Fajardo MD        Subjective:     Principal Problem:Periprosthetic fracture around internal prosthetic joint        HPI:  Patient is a pleasant 83 year old AA female with a past medical history of DM2, dementia, hyperlipidemia, and hx of knee replacements bilaterally.  She is very demented and history is obtained via both of her daughters who are present at bedside.  She presented to [facility] ED with inability to bear weight after a mechanical fall down the stairs.  Denies LOC, but patient has had episodes of sundowning prompting her to try to walk down the stairs.  Otherwise she usually is able to walk down the stairs without issue and is able to walk long distances without limitation at her baseline.  She denies any head trauma.        At the outside ED, vital signs were stable, labs unremarkable.  XR of the femur shows periprosthetic R sided femoral fracture, she was transferred here for orthopedic evaluation, who recommended urgent surgical repair of her femur.  Hospital medicine consulted for preoperative evaluation.      Overview/Hospital Course:  No notes on file    Interval history- she is in great spirits today and feeling good with family at bedside. Awaiting PT OT with recs for ROm to knee but NWB for 8 weeks for healing with DVT PPX for 8 weeks per op note. No fevers since prior to surgery, cRP elevated but not unexpected given fracture so if has reoccurence of fever will further work up into other cause if needed but so far no nidus found on admit and fevers have resolved since prior to surgery. Family would like for HH when med ready for discharge. Hg 8.2 with expected downtick post op. Carrillo and boost added today for protein for  wound healing long term for her.        Review of patient's allergies indicates:  No Known Allergies    No current facility-administered medications on file prior to encounter.     Current Outpatient Medications on File Prior to Encounter   Medication Sig    ferrous sulfate 220 mg (44 mg iron)/5 mL solution Take 220 mg by mouth.    furosemide (LASIX) 20 MG tablet TAKE 1 TABLET BY MOUTH ONCE DAILY AS NEEDED FOR EDEMA    levETIRAcetam (KEPPRA) 250 MG Tab Take 1 tablet (250 mg total) by mouth nightly. (Patient not taking: Reported on 3/15/2023)    mirtazapine (REMERON) 15 MG tablet Take 15 mg by mouth every evening.    multivitamin capsule Take 1 capsule by mouth once daily.    potassium chloride SA (K-DUR,KLOR-CON M) 10 MEQ tablet Take 10 mEq by mouth daily as needed.    venlafaxine (EFFEXOR-XR) 37.5 MG 24 hr capsule Take 1 capsule (37.5 mg total) by mouth once daily. (Patient not taking: Reported on 3/15/2023)     Family History    None       Tobacco Use    Smoking status: Never    Smokeless tobacco: Never   Substance and Sexual Activity    Alcohol use: Never    Drug use: Never    Sexual activity: Not on file     Review of Systems   Reason unable to perform ROS: Patient unable to answer questions appropriately.   Objective:     Vital Signs (Most Recent):  Temp: 99.3 °F (37.4 °C) (06/09/23 0748)  Pulse: 88 (06/09/23 0748)  Resp: 18 (06/09/23 0748)  BP: 126/60 (06/09/23 0748)  SpO2: 96 % (06/09/23 0748) Vital Signs (24h Range):  Temp:  [95.4 °F (35.2 °C)-99.8 °F (37.7 °C)] 99.3 °F (37.4 °C)  Pulse:  [76-92] 88  Resp:  [16-20] 18  SpO2:  [95 %-100 %] 96 %  BP: (126-158)/(60-81) 126/60     Weight: 49.8 kg (109 lb 12.6 oz)  Body mass index is 21.44 kg/m².     Physical Exam  Constitutional:       General: She is not in acute distress.     Appearance: Normal appearance. She is not ill-appearing.      Comments: In spirits, family at bedside.   HENT:      Head: Normocephalic and atraumatic.      Right Ear:  Tympanic membrane normal.      Left Ear: Tympanic membrane normal.   Cardiovascular:      Rate and Rhythm: Normal rate and regular rhythm.      Pulses: Normal pulses.      Heart sounds: Normal heart sounds. No murmur heard.  Pulmonary:      Effort: No respiratory distress.      Breath sounds: No wheezing.   Abdominal:      General: Abdomen is flat. Bowel sounds are normal.      Palpations: Abdomen is soft.   Genitourinary:     Comments: Indwelling arboleda catheter in place with clear urine  Musculoskeletal:         General: No swelling.      Cervical back: Normal range of motion and neck supple.      Comments: R knee with bandages and ropivicaine   Skin:     General: Skin is warm and dry.      Capillary Refill: Capillary refill takes 2 to 3 seconds.   Neurological:      General: No focal deficit present.      Mental Status: She is alert. She is disoriented.   Psychiatric:         Mood and Affect: Mood normal.              Significant Labs: All pertinent labs within the past 24 hours have been reviewed.  BMP:   Recent Labs   Lab 06/09/23  0351   *   *   K 4.1      CO2 23   BUN 15   CREATININE 1.3   CALCIUM 9.1   MG 1.6       CBC:   Recent Labs   Lab 06/08/23  0528 06/09/23  0351   WBC 7.93 8.35   HGB 9.6* 8.2*   HCT 29.8* 25.3*    197       CMP:   Recent Labs   Lab 06/08/23  0528 06/09/23  0351   * 133*   K 4.1 4.1    101   CO2 24 23   * 120*   BUN 16 15   CREATININE 1.1 1.3   CALCIUM 9.1 9.1   PROT 7.2 7.1   ALBUMIN 3.1* 2.9*   BILITOT 0.9 0.4   ALKPHOS 102 89   AST 15 24   ALT 8* 8*   ANIONGAP 9 9         Significant Imaging: I have reviewed all pertinent imaging results/findings within the past 24 hours.  I have reviewed and interpreted all pertinent imaging results/findings within the past 24 hours.      Assessment/Plan:      * Periprosthetic fracture of the right distal femur  Sustained in fall and OR on 6/8 for retrograde nail with orthopedics  -PT/OT to start today,  family prefers  and has 24/ care for her at home. Will likely need hospital bed and will f/u PT recs for this  -ropivicaine in place and multimodal management with pain controlled so far post op  NWB x 8 weeks with ROM to RLE, DVT ppx for 6 weeks per ortho, end date July 21st  -given 1 U of PRBCS for anemia on admit with hg 8.2 post op  -bandages to stay on until orthopedics follow up  -bowel regimen      Acute blood loss anemia  expcted with fracture and inflammation on top of chronic anemia, given 1 U PRBCS prior to surgery given hg 7.7. and continue to closely monitor-->8.2 now      Fever    Unclear cause as present on admit and on 6/7 overnight. UA negative on admit, CXR without signs of PNA. Procal negative on admit, wbc not elevated CRP elevated but not unexpected given fx so watch closely for reoccurence of fevers as resolved since prior to surgery  -no recent symptoms of infecition per family priro to admit  -if continues to persist post op may have to work up further including viral causes and consider LE US for any non infeciotu causes for persistant fever    Iron deficiency anemia  Ferritin of 26 with anemia on admit with iron BID started for this.   -given age and comorbidities, likely would not benefit from extensive work up long term for this such as C scope      Anemia of chronic disease  Hg 9 baseline in the past, iron low and replacement started      Seizure disorder    Continue keppra    Stage 3a chronic kidney disease  Cr at [facility] on admit      Essential hypertension  On no meds at home to treat.       Late onset Alzheimer's disease with behavioral disturbance  Baseline dementia and has family assistance at home. Family at bedside during exam 6/8  Pleasant on exam, alert with mild confusion at times with redirection per family        VTE Risk Mitigation (From admission, onward)         Ordered     apixaban tablet 2.5 mg  2 times daily         06/08/23 1038     Place sequential compression  device  Until discontinued         06/07/23 0239     IP VTE HIGH RISK PATIENT  Once         06/07/23 0239                Discharge Planning   EDDIE: 6/12/2023     Code Status: Full Code   Is the patient medically ready for discharge?: No    Reason for patient still in hospital (select all that apply): Patient trending condition  Discharge Plan A: Home Health, Home with family                  Viki Mcneill MD  Department of Hospital Medicine   [facility] - Surgery

## 2023-06-09 NOTE — NURSING
Patient is resting comfortably, although she is becoming more confused. Patient is lively, but not agitated. Daughter at bedside.

## 2023-06-09 NOTE — PT/OT/SLP EVAL
Occupational Therapy   Co-Evaluation/tx  Co-treatment with PT for maximal pt participation, safety, and activity tolerance     Name: Ally Steiner  MRN: [id number]  Admitting Diagnosis: Periprosthetic fracture around internal prosthetic joint  Recent Surgery: Procedure(s) (LRB):  INSERTION, INTRAMEDULLARY FARHAT, FEMUR, DISTAL, RETROGRADE - right. trios, synthes, c arm door side (Right) 1 Day Post-Op    Recommendations:     Discharge Recommendations: nursing facility, skilled  Discharge Equipment Recommendations:   (TBD)  Barriers to discharge:  Other (Comment)    Assessment:     Ally Steiner is a 83 y.o. female with a medical diagnosis of Periprosthetic fracture around internal prosthetic joint.  She presents with c/o pain with movement however had a hard time maintain WB precautions throughout session. Performance deficits affecting function: pain, decreased safety awareness, decreased lower extremity function, impaired functional mobility, impaired self care skills, gait instability, impaired balance, impaired endurance, weakness, orthopedic precautions.      Rehab Prognosis: Fair; patient would benefit from acute skilled OT services to address these deficits and reach maximum level of function.       Plan:     Patient to be seen 3 x/week to address the above listed problems via neuromuscular re-education, self-care/home management, therapeutic activities, therapeutic exercises  Plan of Care Expires: 07/09/23  Plan of Care Reviewed with: patient, daughter    Subjective     Patient/Family Comments/goals: Pt reports  she like to clean up around the house    Occupational Profile:  Living Environment: Pt lives with daughter in single level home  with 4 SHITAL, L HR available. Bathroom set-up: walk-in shower  Prior Level of Function: independent with mobility and required some assist for ADLs  DME owned: bedside commode, wheelchair, scooter, and rollator  Support Available/Caregiver Assistance:  assist from  daughters as needed    Pain/Comfort:  Pain Rating 1: 3/10  Location - Side 1: Right  Location 1: leg  Pain Addressed 1: Reposition, Distraction, Nurse notified    Patients cultural, spiritual, Muslim conflicts given the current situation: no    Objective:     Communicated with: RN prior to session.  Patient found HOB elevated with SCD, FCD, PureWick, perineural catheter, peripheral IV upon OT entry to room.    General Precautions: Standard, fall  Orthopedic Precautions: RLE non weight bearing  Braces: N/A  Respiratory Status: Room air    Occupational Performance:    Bed Mobility:    Scooting with minimal assistance  Supine > Sit with minimal assistance    Functional Mobility/Transfers:  Sit <> Stand Transfer: Minimal Assistance of 2 persons x 3 trials from EOB (2 trials performed with RW, 1 trial performed with no AD) . Pt required assist of 1 person to clear hips from EOB, and assist from additional therapist to maintain RLE NWB)   Bed > Chair Stand Pivot Transfer:  Moderate Assistance of 2 persons with no AD  (2person maintain precautions)    Activities of Daily Living:  Toileting: total assistance purewick in place   Politely decline all further ADLs\    Cognitive/Visual Perceptual:  Cognitive/Psychosocial Skills:     -       Oriented to: Person   -       Follows Commands/attention:Easily distracted and Follows one-step commands  -       Communication: clear/fluent  -       Memory: Impaired STM, Impaired LTM, and Poor immediate recall  -       Safety awareness/insight to disability: impaired   -       Mood/Affect/Coping skills/emotional control: Appropriate to situation  Visual/Perceptual:      -Intact      Physical Exam:  Dynamic Sitting: FAIR+: Maintains balance through MINIMAL excursions of active trunk motion  Standing Static: 0: Needs MAXIMAL assist to maintain   Upper Extremity Range of Motion:     -       Right Upper Extremity: WFL  -       Left Upper Extremity: WFL  Upper Extremity Strength:    -        Right Upper Extremity: WFL  -       Left Upper Extremity: WFL   Strength:    -       Right Upper Extremity: WFL  -       Left Upper Extremity: WFL    AMPAC 6 Click ADL:  AMPAC Total Score: 14    Treatment & Education:  Pt.educated and reviewed WB precautions   Pt educated on role of occupational therapy, POC, and safety during ADLs and functional mobility. Pt and OT discussed importance of safe, continued mobility to optimize daily living skills. Pt verbalized understanding. Pt given instruction to call for medical staff/nurse for assistance.       Patient left up in chair with all lines intact, call button in reach, and daughters present    GOALS:   Multidisciplinary Problems       Occupational Therapy Goals          Problem: Occupational Therapy    Goal Priority Disciplines Outcome Interventions   Occupational Therapy Goal     OT, PT/OT Ongoing, Progressing    Description: Goals to be met by: 6/19/23     Patient will increase functional independence with ADLs by performing:    UE Dressing with Moderate Assistance.  Grooming while EOB with Minimal Assistance.  Toileting from bedside commode with Minimal Assistance for hygiene and clothing management.   Stand pivot transfers with Minimal Assistance.  Squat pivot transfers with Minimal Assistance.  Step transfer with Minimal Assistance                         History:     Past Medical History:   Diagnosis Date    Essential hypertension     Diet controlled. bp well controlled today.     History of hypertension     always been able to manage with diet    Hx of diabetes mellitus     Always controlled with diet and weight loss    Hyperlipidemia     always been able to manage with diet    Late onset Alzheimer's disease with behavioral disturbance 8/6/2020    Established with neuropsych. Due for follow up ~10/2021. Family helps with ADLs. Does not drive    Memory loss     Osteoarthritis     Pure hypercholesterolemia     always been able to manage with diet    Seizure  disorder 8/26/2022    Stage 3a chronic kidney disease 1/13/2021    Reviewed labs, plan for more labs next visit.          Past Surgical History:   Procedure Laterality Date    CHOLECYSTECTOMY      Hysterectomy      KNEE SURGERY Left 2018    knee replacement    KNEE SURGERY Right 2000    knee replacement    OPEN REDUCTION OF FRACTURE OF MANDIBLE Left 1/6/2021    Procedure: OPEN REDUCTION, FRACTURE, MANDIBLE;  Surgeon: Dar Mcgill MD;  Location: [facility] OR [address] [place] [address];  Service: ENT;  Laterality: Left;    REDUCTION OF BOTH BREASTS Bilateral 1990    RETROGRADE INTRAMEDULLARY RODDING OF DISTAL FEMUR Right 6/8/2023    Procedure: INSERTION, INTRAMEDULLARY FARHAT, FEMUR, DISTAL, RETROGRADE - right. trios, synthes, c arm door side;  Surgeon: Nicolas Min MD;  Location: [facility] OR [address] [place] [address];  Service: Orthopedics;  Laterality: Right;       Time Tracking:     OT Date of Treatment: 06/09/23  OT Start Time: 1025  OT Stop Time: 1103  OT Total Time (min): 38 min    Billable Minutes:Evaluation 8  Therapeutic Activity 30    6/9/2023

## 2023-06-09 NOTE — SUBJECTIVE & OBJECTIVE
Interval history- she is in great spirits today and feeling good with family at bedside. Awaiting PT OT with recs for ROm to knee but NWB for 8 weeks for healing with DVT PPX for 8 weeks per op note. No fevers since prior to surgery, cRP elevated but not unexpected given fracture so if has reoccurence of fever will further work up into other cause if needed but so far no nidus found on admit and fevers have resolved since prior to surgery. Family would like for HH when med ready for discharge. Hg 8.2 with expected downtick post op. Carrillo and boost added today for protein for wound healing long term for her.        Review of patient's allergies indicates:  No Known Allergies    No current facility-administered medications on file prior to encounter.     Current Outpatient Medications on File Prior to Encounter   Medication Sig    ferrous sulfate 220 mg (44 mg iron)/5 mL solution Take 220 mg by mouth.    furosemide (LASIX) 20 MG tablet TAKE 1 TABLET BY MOUTH ONCE DAILY AS NEEDED FOR EDEMA    levETIRAcetam (KEPPRA) 250 MG Tab Take 1 tablet (250 mg total) by mouth nightly. (Patient not taking: Reported on 3/15/2023)    mirtazapine (REMERON) 15 MG tablet Take 15 mg by mouth every evening.    multivitamin capsule Take 1 capsule by mouth once daily.    potassium chloride SA (K-DUR,KLOR-CON M) 10 MEQ tablet Take 10 mEq by mouth daily as needed.    venlafaxine (EFFEXOR-XR) 37.5 MG 24 hr capsule Take 1 capsule (37.5 mg total) by mouth once daily. (Patient not taking: Reported on 3/15/2023)     Family History    None       Tobacco Use    Smoking status: Never    Smokeless tobacco: Never   Substance and Sexual Activity    Alcohol use: Never    Drug use: Never    Sexual activity: Not on file     Review of Systems   Reason unable to perform ROS: Patient unable to answer questions appropriately.   Objective:     Vital Signs (Most Recent):  Temp: 99.3 °F (37.4 °C) (06/09/23 0748)  Pulse: 88 (06/09/23 0748)  Resp: 18 (06/09/23  0748)  BP: 126/60 (06/09/23 0748)  SpO2: 96 % (06/09/23 0748) Vital Signs (24h Range):  Temp:  [95.4 °F (35.2 °C)-99.8 °F (37.7 °C)] 99.3 °F (37.4 °C)  Pulse:  [76-92] 88  Resp:  [16-20] 18  SpO2:  [95 %-100 %] 96 %  BP: (126-158)/(60-81) 126/60     Weight: 49.8 kg (109 lb 12.6 oz)  Body mass index is 21.44 kg/m².     Physical Exam  Constitutional:       General: She is not in acute distress.     Appearance: Normal appearance. She is not ill-appearing.      Comments: In spirits, family at bedside.   HENT:      Head: Normocephalic and atraumatic.      Right Ear: Tympanic membrane normal.      Left Ear: Tympanic membrane normal.   Cardiovascular:      Rate and Rhythm: Normal rate and regular rhythm.      Pulses: Normal pulses.      Heart sounds: Normal heart sounds. No murmur heard.  Pulmonary:      Effort: No respiratory distress.      Breath sounds: No wheezing.   Abdominal:      General: Abdomen is flat. Bowel sounds are normal.      Palpations: Abdomen is soft.   Genitourinary:     Comments: Indwelling arboleda catheter in place with clear urine  Musculoskeletal:         General: No swelling.      Cervical back: Normal range of motion and neck supple.      Comments: R knee with bandages and ropivicaine   Skin:     General: Skin is warm and dry.      Capillary Refill: Capillary refill takes 2 to 3 seconds.   Neurological:      General: No focal deficit present.      Mental Status: She is alert. She is disoriented.   Psychiatric:         Mood and Affect: Mood normal.              Significant Labs: All pertinent labs within the past 24 hours have been reviewed.  BMP:   Recent Labs   Lab 06/09/23  0351   *   *   K 4.1      CO2 23   BUN 15   CREATININE 1.3   CALCIUM 9.1   MG 1.6       CBC:   Recent Labs   Lab 06/08/23 0528 06/09/23  0351   WBC 7.93 8.35   HGB 9.6* 8.2*   HCT 29.8* 25.3*    197       CMP:   Recent Labs   Lab 06/08/23 0528 06/09/23  0351   * 133*   K 4.1 4.1    101    CO2 24 23   * 120*   BUN 16 15   CREATININE 1.1 1.3   CALCIUM 9.1 9.1   PROT 7.2 7.1   ALBUMIN 3.1* 2.9*   BILITOT 0.9 0.4   ALKPHOS 102 89   AST 15 24   ALT 8* 8*   ANIONGAP 9 9         Significant Imaging: I have reviewed all pertinent imaging results/findings within the past 24 hours.  I have reviewed and interpreted all pertinent imaging results/findings within the past 24 hours.

## 2023-06-09 NOTE — PLAN OF CARE
06/09/23 1442   Post-Acute Status   Post-Acute Authorization Placement;Home Health   Post-Acute Placement Status Referrals Sent   Home Health Status Referrals Sent   Discharge Plan   Discharge Plan A Home with family;Home Health     SW met with the pt and pt's family (Alyssa #[phone]) at bedside to discuss therapy recommending SNF. Alyssa continues to decline SNF placement, due to the pt's history with dementia, agreeable to HH. Family requesting hospital bed. LIDA informed may not be covered, [facility] to discuss payment options.     LIDA called [facility], to obtain previous  company. [facility]. LIDA sent referral via [email or web address].  LIDA also sent two SNF referrals for backup plan, asked facilities not to call pt nor family. SW to follow.     Maribel Valenzuela LMSW  Case Management   [facility]   Ext. [phone]

## 2023-06-10 LAB
ANION GAP SERPL CALC-SCNC: 6 MMOL/L (ref 8–16)
BASOPHILS # BLD AUTO: 0.03 K/UL (ref 0–0.2)
BASOPHILS NFR BLD: 0.4 % (ref 0–1.9)
BUN SERPL-MCNC: 20 MG/DL (ref 8–23)
CALCIUM SERPL-MCNC: 9.1 MG/DL (ref 8.7–10.5)
CHLORIDE SERPL-SCNC: 105 MMOL/L (ref 95–110)
CO2 SERPL-SCNC: 24 MMOL/L (ref 23–29)
CREAT SERPL-MCNC: 1 MG/DL (ref 0.5–1.4)
DIFFERENTIAL METHOD: ABNORMAL
EOSINOPHIL # BLD AUTO: 0.3 K/UL (ref 0–0.5)
EOSINOPHIL NFR BLD: 3.9 % (ref 0–8)
ERYTHROCYTE [DISTWIDTH] IN BLOOD BY AUTOMATED COUNT: 16.2 % (ref 11.5–14.5)
EST. GFR  (NO RACE VARIABLE): 56.2 ML/MIN/1.73 M^2
GLUCOSE SERPL-MCNC: 91 MG/DL (ref 70–110)
HCT VFR BLD AUTO: 24.5 % (ref 37–48.5)
HGB BLD-MCNC: 7.9 G/DL (ref 12–16)
IMM GRANULOCYTES # BLD AUTO: 0.01 K/UL (ref 0–0.04)
IMM GRANULOCYTES NFR BLD AUTO: 0.1 % (ref 0–0.5)
LYMPHOCYTES # BLD AUTO: 1.5 K/UL (ref 1–4.8)
LYMPHOCYTES NFR BLD: 21.1 % (ref 18–48)
MCH RBC QN AUTO: 27.4 PG (ref 27–31)
MCHC RBC AUTO-ENTMCNC: 32.2 G/DL (ref 32–36)
MCV RBC AUTO: 85 FL (ref 82–98)
MONOCYTES # BLD AUTO: 0.6 K/UL (ref 0.3–1)
MONOCYTES NFR BLD: 9.3 % (ref 4–15)
NEUTROPHILS # BLD AUTO: 4.5 K/UL (ref 1.8–7.7)
NEUTROPHILS NFR BLD: 65.2 % (ref 38–73)
NRBC BLD-RTO: 0 /100 WBC
PLATELET # BLD AUTO: 207 K/UL (ref 150–450)
PMV BLD AUTO: 11.9 FL (ref 9.2–12.9)
POTASSIUM SERPL-SCNC: 4.1 MMOL/L (ref 3.5–5.1)
RBC # BLD AUTO: 2.88 M/UL (ref 4–5.4)
SODIUM SERPL-SCNC: 135 MMOL/L (ref 136–145)
WBC # BLD AUTO: 6.86 K/UL (ref 3.9–12.7)

## 2023-06-10 PROCEDURE — 25000003 PHARM REV CODE 250: Performed by: NURSE PRACTITIONER

## 2023-06-10 PROCEDURE — 99231 PR SUBSEQUENT HOSPITAL CARE,LEVL I: ICD-10-PCS | Mod: ,,, | Performed by: ANESTHESIOLOGY

## 2023-06-10 PROCEDURE — 85025 COMPLETE CBC W/AUTO DIFF WBC: CPT | Performed by: HOSPITALIST

## 2023-06-10 PROCEDURE — 25000003 PHARM REV CODE 250: Performed by: STUDENT IN AN ORGANIZED HEALTH CARE EDUCATION/TRAINING PROGRAM

## 2023-06-10 PROCEDURE — 11000001 HC ACUTE MED/SURG PRIVATE ROOM

## 2023-06-10 PROCEDURE — 36415 COLL VENOUS BLD VENIPUNCTURE: CPT | Performed by: HOSPITALIST

## 2023-06-10 PROCEDURE — 99231 SBSQ HOSP IP/OBS SF/LOW 25: CPT | Mod: ,,, | Performed by: ANESTHESIOLOGY

## 2023-06-10 PROCEDURE — 25000003 PHARM REV CODE 250: Performed by: HOSPITALIST

## 2023-06-10 PROCEDURE — 99232 PR SUBSEQUENT HOSPITAL CARE,LEVL II: ICD-10-PCS | Mod: ,,, | Performed by: HOSPITALIST

## 2023-06-10 PROCEDURE — 80048 BASIC METABOLIC PNL TOTAL CA: CPT | Performed by: HOSPITALIST

## 2023-06-10 PROCEDURE — 99232 SBSQ HOSP IP/OBS MODERATE 35: CPT | Mod: ,,, | Performed by: HOSPITALIST

## 2023-06-10 PROCEDURE — 25000003 PHARM REV CODE 250: Performed by: INTERNAL MEDICINE

## 2023-06-10 RX ORDER — GLYCERIN 1 G/1
1 SUPPOSITORY RECTAL ONCE
Status: COMPLETED | OUTPATIENT
Start: 2023-06-11 | End: 2023-06-11

## 2023-06-10 RX ORDER — DIPHENHYDRAMINE HCL 25 MG
25 CAPSULE ORAL NIGHTLY PRN
Status: DISCONTINUED | OUTPATIENT
Start: 2023-06-10 | End: 2023-06-12 | Stop reason: HOSPADM

## 2023-06-10 RX ADMIN — MUPIROCIN: 20 OINTMENT TOPICAL at 09:06

## 2023-06-10 RX ADMIN — CHOLECALCIFEROL TAB 25 MCG (1000 UNIT) 1000 UNITS: 25 TAB at 09:06

## 2023-06-10 RX ADMIN — FERROUS SULFATE TAB 325 MG (65 MG ELEMENTAL FE) 1 EACH: 325 (65 FE) TAB at 12:06

## 2023-06-10 RX ADMIN — METHOCARBAMOL 500 MG: 500 TABLET ORAL at 09:06

## 2023-06-10 RX ADMIN — SENNOSIDES AND DOCUSATE SODIUM 1 TABLET: 50; 8.6 TABLET ORAL at 09:06

## 2023-06-10 RX ADMIN — ACETAMINOPHEN 1000 MG: 500 TABLET ORAL at 09:06

## 2023-06-10 RX ADMIN — LEVETIRACETAM 250 MG: 250 TABLET, FILM COATED ORAL at 09:06

## 2023-06-10 RX ADMIN — FERROUS SULFATE TAB 325 MG (65 MG ELEMENTAL FE) 1 EACH: 325 (65 FE) TAB at 09:06

## 2023-06-10 RX ADMIN — DIPHENHYDRAMINE HYDROCHLORIDE 25 MG: 25 CAPSULE ORAL at 09:06

## 2023-06-10 RX ADMIN — APIXABAN 2.5 MG: 2.5 TABLET, FILM COATED ORAL at 09:06

## 2023-06-10 RX ADMIN — THERA TABS 1 TABLET: TAB at 09:06

## 2023-06-10 RX ADMIN — Medication 6 MG: at 09:06

## 2023-06-10 NOTE — PT/OT/SLP PROGRESS
Physical Therapy      Patient Name:  Ally Steiner   MRN:  [id number]    Patient not seen today secondary to Patient fatigue. Pt resting soundly, daughter at bedside requests to allow pt continued rest and follow up as able. Pt will be scheduled for follow-up 6/11.

## 2023-06-10 NOTE — PLAN OF CARE
Pt resting in bed comfortably. Oriented to self and place only, but pleasant and cooperative with nursing care. PIV line intact and free of infection and irritation. Fall precautions maintained, no falls noted. Call light within reach, bed locked and in lowest position. Non-skid socks on while out of bed. Patient instructed to call for assistance. Skin integrity maintained as patient is independent with frequent repositioning. No complaints at this time. Progressing towards goals. Will continue to monitor and follow plan of care.

## 2023-06-10 NOTE — NURSING
Vitals signs not taken after midnight and Tylenol not given this morning per son's request. He suggested to let his mom rest as she was not able to sleep for the past 24 hrs. Will endorse it to day shift.

## 2023-06-10 NOTE — ASSESSMENT & PLAN NOTE
Sustained in fall and OR on 6/8 for retrograde nail with orthopedics  -PT/OT recs snf but family prefers  and has 24/ care for her at home. Will likely need hospital bed and will f/u PT recs for this  -ropivicaine in place and multimodal management with pain controlled so far post op  NWB x 8 weeks with ROM to RLE, DVT ppx for 6 weeks per ortho, end date July 21st  -given 1 U of PRBCS for anemia on admit with hg 8.2 post op  -bandages to stay on until orthopedics follow up  -bowel regimen- family brought metamucil and plans to do her regular routinue on 6/10 to help her on commode to help promote BM

## 2023-06-10 NOTE — PROGRESS NOTES
[facility] - Surgery  [facility] Medicine  Progress Note    Patient Name: Ally Steiner  MRN: [id number]  Patient Class: IP- Inpatient   Admission Date: 6/7/2023  Length of Stay: 3 days  Attending Physician: Viki Mcneill MD  Primary Care Provider: Josh Fajardo MD        Subjective:     Principal Problem:Periprosthetic fracture around internal prosthetic joint        HPI:  Patient is a pleasant 83 year old AA female with a past medical history of DM2, dementia, hyperlipidemia, and hx of knee replacements bilaterally.  She is very demented and history is obtained via both of her daughters who are present at bedside.  She presented to [facility] ED with inability to bear weight after a mechanical fall down the stairs.  Denies LOC, but patient has had episodes of sundowning prompting her to try to walk down the stairs.  Otherwise she usually is able to walk down the stairs without issue and is able to walk long distances without limitation at her baseline.  She denies any head trauma.        At the outside ED, vital signs were stable, labs unremarkable.  XR of the femur shows periprosthetic R sided femoral fracture, she was transferred here for orthopedic evaluation, who recommended urgent surgical repair of her femur.  Hospital medicine consulted for preoperative evaluation.      Overview/Hospital Course:  No notes on file    Interval history- feeling well this morning, slept a little better last night per family at bedside. Takes tylenol PM at hoem sometimes as well so added benadryl as a PRN which is in tylenol PM in case she needs the equivalent while here as on home remeron and has melatonin as well for sleep here. Working on BM and family brought home metamucil and is going to get her on the commode like she does at hoem to get her into her home regimen more which they think will likely help promote to be in her normal routinue. Had a little pain this morning so plan to premedicate before  therapy to avoid worsening. Rec for SNF but family prefers  on discharge as she has a lot of help at home with family.        Review of patient's allergies indicates:  No Known Allergies    No current facility-administered medications on file prior to encounter.     Current Outpatient Medications on File Prior to Encounter   Medication Sig    ferrous sulfate 220 mg (44 mg iron)/5 mL solution Take 220 mg by mouth.    furosemide (LASIX) 20 MG tablet TAKE 1 TABLET BY MOUTH ONCE DAILY AS NEEDED FOR EDEMA    levETIRAcetam (KEPPRA) 250 MG Tab Take 1 tablet (250 mg total) by mouth nightly. (Patient not taking: Reported on 3/15/2023)    mirtazapine (REMERON) 15 MG tablet Take 15 mg by mouth every evening.    multivitamin capsule Take 1 capsule by mouth once daily.    potassium chloride SA (K-DUR,KLOR-CON M) 10 MEQ tablet Take 10 mEq by mouth daily as needed.    venlafaxine (EFFEXOR-XR) 37.5 MG 24 hr capsule Take 1 capsule (37.5 mg total) by mouth once daily. (Patient not taking: Reported on 3/15/2023)     Family History    None       Tobacco Use    Smoking status: Never    Smokeless tobacco: Never   Substance and Sexual Activity    Alcohol use: Never    Drug use: Never    Sexual activity: Not on file     Review of Systems   Reason unable to perform ROS: Patient unable to answer questions appropriately.   Objective:     Vital Signs (Most Recent):  Temp: 99.1 °F (37.3 °C) (06/10/23 0734)  Pulse: 84 (06/10/23 0734)  Resp: 18 (06/10/23 0734)  BP: 138/67 (06/10/23 0734)  SpO2: 96 % (06/10/23 0734) Vital Signs (24h Range):  Temp:  [98.1 °F (36.7 °C)-99.1 °F (37.3 °C)] 99.1 °F (37.3 °C)  Pulse:  [76-97] 84  Resp:  [17-18] 18  SpO2:  [95 %-98 %] 96 %  BP: (138-159)/(67-95) 138/67     Weight: 49.8 kg (109 lb 12.6 oz)  Body mass index is 21.44 kg/m².     Physical Exam  Constitutional:       General: She is not in acute distress.     Appearance: Normal appearance. She is not ill-appearing.      Comments: Sleeping this  morning, family at bedside.   HENT:      Head: Normocephalic and atraumatic.      Right Ear: Tympanic membrane normal.      Left Ear: Tympanic membrane normal.   Cardiovascular:      Rate and Rhythm: Normal rate and regular rhythm.      Pulses: Normal pulses.      Heart sounds: Normal heart sounds. No murmur heard.  Pulmonary:      Effort: No respiratory distress.      Breath sounds: No wheezing.   Abdominal:      General: Abdomen is flat. Bowel sounds are normal.      Palpations: Abdomen is soft.   Musculoskeletal:         General: No swelling.      Cervical back: Normal range of motion and neck supple.      Comments: R knee with bandages and ropivicaine   Skin:     General: Skin is warm and dry.      Capillary Refill: Capillary refill takes 2 to 3 seconds.   Neurological:      General: No focal deficit present.      Mental Status: She is alert. She is disoriented.   Psychiatric:         Mood and Affect: Mood normal.              Significant Labs: All pertinent labs within the past 24 hours have been reviewed.  BMP:   Recent Labs   Lab 06/09/23  0351 06/10/23  0704   * 91   * 135*   K 4.1 4.1    105   CO2 23 24   BUN 15 20   CREATININE 1.3 1.0   CALCIUM 9.1 9.1   MG 1.6  --        CBC:   Recent Labs   Lab 06/09/23  0351 06/10/23  0704   WBC 8.35 6.86   HGB 8.2* 7.9*   HCT 25.3* 24.5*    207       CMP:   Recent Labs   Lab 06/09/23  0351 06/10/23  0704   * 135*   K 4.1 4.1    105   CO2 23 24   * 91   BUN 15 20   CREATININE 1.3 1.0   CALCIUM 9.1 9.1   PROT 7.1  --    ALBUMIN 2.9*  --    BILITOT 0.4  --    ALKPHOS 89  --    AST 24  --    ALT 8*  --    ANIONGAP 9 6*         Significant Imaging: I have reviewed all pertinent imaging results/findings within the past 24 hours.  I have reviewed and interpreted all pertinent imaging results/findings within the past 24 hours.      Assessment/Plan:      * Periprosthetic fracture of the right distal femur  Sustained in fall and OR on  6/8 for retrograde nail with orthopedics  -PT/OT recs snf but family prefers  and has 24/ care for her at home. Will likely need hospital bed and will f/u PT recs for this  -ropivicaine in place and multimodal management with pain controlled so far post op  NWB x 8 weeks with ROM to RLE, DVT ppx for 6 weeks per ortho, end date July 21st  -given 1 U of PRBCS for anemia on admit with hg 8.2 post op  -bandages to stay on until orthopedics follow up  -bowel regimen- family brought metamucil and plans to do her regular routinue on 6/10 to help her on commode to help promote BM       Acute blood loss anemia  expcted with fracture and inflammation on top of chronic anemia, given 1 U PRBCS prior to surgery given hg 7.7. and continue to closely monitor-->8.2 now      Fever    Unclear cause as present on admit and on 6/7 overnight. UA negative on admit, CXR without signs of PNA. Procal negative on admit, wbc not elevated CRP elevated but not unexpected given fx so watch closely for reoccurence of fevers as resolved since prior to surgery  -no recent symptoms of infecition per family priro to admit  -if continues to persist post op may have to work up further including viral causes and consider LE US for any non infeciotu causes for persistant fever  None further as of 6/10    Iron deficiency anemia  Ferritin of 26 with anemia on admit with iron BID started for this.   -given age and comorbidities, likely would not benefit from extensive work up long term for this such as C scope      Anemia of chronic disease  Hg 9 baseline in the past, iron low and replacement started      Seizure disorder    Continue keppra    Stage 3a chronic kidney disease  Cr at [facility] on admit      Essential hypertension  On no meds at home to treat.       Late onset Alzheimer's disease with behavioral disturbance  Baseline dementia and has family assistance at home. Family at bedside during exam 6/8  Pleasant on exam, alert with mild confusion at  times with redirection per family        VTE Risk Mitigation (From admission, onward)         Ordered     apixaban tablet 2.5 mg  2 times daily         06/08/23 1038     Place sequential compression device  Until discontinued         06/07/23 0239     IP VTE HIGH RISK PATIENT  Once         06/07/23 0239                Discharge Planning   EDDIE: 6/12/2023     Code Status: Full Code   Is the patient medically ready for discharge?: No    Reason for patient still in hospital (select all that apply): Patient trending condition  Discharge Plan A: Home with family, Home Health                  Viki Mcneill MD  Department of Hospital Medicine   [facility] - Surgery

## 2023-06-10 NOTE — PROGRESS NOTES
[facility] - [facility]  Orthopedics  Progress Note    Patient Name: Ally Steiner  MRN: [id number]  Admission Date: 6/7/2023  Hospital Length of Stay: 2 days  Attending Provider: Viki Mcneill MD  Primary Care Provider: Josh Fajardo MD  Follow-up For: Procedure(s) (LRB):  INSERTION, INTRAMEDULLARY FARHAT, FEMUR, DISTAL, RETROGRADE - right. trios, synthes, c arm door side (Right)    Post-Operative Day: 2 Day Post-Op  Subjective:     Principal Problem:Periprosthetic fracture around internal prosthetic joint    Principal Orthopedic Problem: Same    Interval History: NAEON, patient stable, pain controlled. No acute complaints this morning. Dressings CDI.     Review of patient's allergies indicates:  No Known Allergies    Current Facility-Administered Medications   Medication    0.9%  NaCl infusion (for blood administration)    acetaminophen tablet 1,000 mg    apixaban tablet 2.5 mg    dextrose 10% bolus 125 mL 125 mL    dextrose 10% bolus 250 mL 250 mL    dextrose 40 % gel 15,000 mg    dextrose 40 % gel 30,000 mg    ferrous sulfate tablet 1 each    glucagon (human recombinant) injection 1 mg    levETIRAcetam tablet 250 mg    melatonin tablet 6 mg    methocarbamoL tablet 500 mg    mirtazapine tablet 15 mg    multivitamin tablet    mupirocin 2 % ointment    naloxone 0.4 mg/mL injection 0.02 mg    ondansetron injection 4 mg    ROPIvacaine (PF) 2 mg/ml (0.2%) solution    senna-docusate 8.6-50 mg per tablet 1 tablet    sodium chloride 0.9% flush 10 mL    vitamin D 1000 units tablet 1,000 Units     Objective:     Vital Signs (Most Recent):  Temp: 99 °F (37.2 °C) (06/09/23 2001)  Pulse: 97 (06/09/23 2001)  Resp: 18 (06/09/23 2001)  BP: (!) 142/79 (06/09/23 2001)  SpO2: 96 % (06/09/23 2001) Vital Signs (24h Range):  Temp:  [95.4 °F (35.2 °C)-99.8 °F (37.7 °C)] 99 °F (37.2 °C)  Pulse:  [76-97] 97  Resp:  [17-20] 18  SpO2:  [95 %-98 %] 96 %  BP: (126-159)/(60-95) 142/79     Weight: 49.8 kg (109 lb 12.6 oz)  Height:  5' (152.4 cm)  Body mass index is 21.44 kg/m².      Intake/Output Summary (Last 24 hours) at 6/9/2023 2249  Last data filed at 6/9/2023 1700  Gross per 24 hour   Intake 480 ml   Output 1350 ml   Net -870 ml       Physical Exam  Ortho/SPM Exam  Alert, confused     LLE  Surgical dressings at thigh cdi  Grossly NVI, dp pulse 2+    Significant Labs: A1C:   Recent Labs   Lab 06/07/23  0058   HGBA1C 5.6     CBC:   Recent Labs   Lab 06/08/23  0528 06/09/23  0351   WBC 7.93 8.35   HGB 9.6* 8.2*   HCT 29.8* 25.3*    197     CMP:   Recent Labs   Lab 06/08/23 0528 06/09/23  0351   * 133*   K 4.1 4.1    101   CO2 24 23   * 120*   BUN 16 15   CREATININE 1.1 1.3   CALCIUM 9.1 9.1   PROT 7.2 7.1   ALBUMIN 3.1* 2.9*   BILITOT 0.9 0.4   ALKPHOS 102 89   AST 15 24   ALT 8* 8*   ANIONGAP 9 9     CRP:   Recent Labs   Lab 06/08/23  1055   .3*       All pertinent labs within the past 24 hours have been reviewed.      Significant Imaging: I have reviewed and interpreted all pertinent imaging results/findings.   Assessment/Plan:     * Periprosthetic fracture of the right distal femur  Patient is a 83-year-old female who presents with a right distal femur periprosthetic fracture after a ground level fall 6/6/23  She ambulates independently in her home but uses a wheelchair for distances due to dementia.  She has a past medical history significant only for dementia.  She takes no anticoagulation at home.  She lives with her daughter.      Sp operative fixation 6/8/23 w Dr Min    Multimodal pain regimen  TTWB RLE   PT/OT daily  SCDs, eliquis 2.5 bid for DVT ppx  Moniotor hb labs pending this AM  Hickman discontinued   FU 2 wks w ortho        Juarez Champion MD  Orthopedics  [facility] - Surgery

## 2023-06-10 NOTE — ASSESSMENT & PLAN NOTE
Unclear cause as present on admit and on 6/7 overnight. UA negative on admit, CXR without signs of PNA. Procal negative on admit, wbc not elevated CRP elevated but not unexpected given fx so watch closely for reoccurence of fevers as resolved since prior to surgery  -no recent symptoms of infecition per family priro to admit  -if continues to persist post op may have to work up further including viral causes and consider LE US for any non infeciotu causes for persistant fever  None further as of 6/10

## 2023-06-10 NOTE — SUBJECTIVE & OBJECTIVE
Interval history- feeling well this morning, slept a little better last night per family at bedside. Takes tylenol PM at [facility] sometimes as well so added benadryl as a PRN which is in tylenol PM in case she needs the equivalent while here as on home remeron and has melatonin as well for sleep here. Working on BM and family brought home metamucil and is going to get her on the commode like she does at [place] to get her into her home regimen more which they think will likely help promote to be in her normal routinue. Had a little pain this morning so plan to premedicate before therapy to avoid worsening. Rec for SNF but family prefers HH on discharge as she has a lot of help at home with family.        Review of patient's allergies indicates:  No Known Allergies    No current facility-administered medications on file prior to encounter.     Current Outpatient Medications on File Prior to Encounter   Medication Sig    ferrous sulfate 220 mg (44 mg iron)/5 mL solution Take 220 mg by mouth.    furosemide (LASIX) 20 MG tablet TAKE 1 TABLET BY MOUTH ONCE DAILY AS NEEDED FOR EDEMA    levETIRAcetam (KEPPRA) 250 MG Tab Take 1 tablet (250 mg total) by mouth nightly. (Patient not taking: Reported on 3/15/2023)    mirtazapine (REMERON) 15 MG tablet Take 15 mg by mouth every evening.    multivitamin capsule Take 1 capsule by mouth once daily.    potassium chloride SA (K-DUR,KLOR-CON M) 10 MEQ tablet Take 10 mEq by mouth daily as needed.    venlafaxine (EFFEXOR-XR) 37.5 MG 24 hr capsule Take 1 capsule (37.5 mg total) by mouth once daily. (Patient not taking: Reported on 3/15/2023)     Family History    None       Tobacco Use    Smoking status: Never    Smokeless tobacco: Never   Substance and Sexual Activity    Alcohol use: Never    Drug use: Never    Sexual activity: Not on file     Review of Systems   Reason unable to perform ROS: Patient unable to answer questions appropriately.   Objective:     Vital Signs (Most Recent):  Temp:  99.1 °F (37.3 °C) (06/10/23 0734)  Pulse: 84 (06/10/23 0734)  Resp: 18 (06/10/23 0734)  BP: 138/67 (06/10/23 0734)  SpO2: 96 % (06/10/23 0734) Vital Signs (24h Range):  Temp:  [98.1 °F (36.7 °C)-99.1 °F (37.3 °C)] 99.1 °F (37.3 °C)  Pulse:  [76-97] 84  Resp:  [17-18] 18  SpO2:  [95 %-98 %] 96 %  BP: (138-159)/(67-95) 138/67     Weight: 49.8 kg (109 lb 12.6 oz)  Body mass index is 21.44 kg/m².     Physical Exam  Constitutional:       General: She is not in acute distress.     Appearance: Normal appearance. She is not ill-appearing.      Comments: Sleeping this morning, family at bedside.   HENT:      Head: Normocephalic and atraumatic.      Right Ear: Tympanic membrane normal.      Left Ear: Tympanic membrane normal.   Cardiovascular:      Rate and Rhythm: Normal rate and regular rhythm.      Pulses: Normal pulses.      Heart sounds: Normal heart sounds. No murmur heard.  Pulmonary:      Effort: No respiratory distress.      Breath sounds: No wheezing.   Abdominal:      General: Abdomen is flat. Bowel sounds are normal.      Palpations: Abdomen is soft.   Musculoskeletal:         General: No swelling.      Cervical back: Normal range of motion and neck supple.      Comments: R knee with bandages and ropivicaine   Skin:     General: Skin is warm and dry.      Capillary Refill: Capillary refill takes 2 to 3 seconds.   Neurological:      General: No focal deficit present.      Mental Status: She is alert. She is disoriented.   Psychiatric:         Mood and Affect: Mood normal.              Significant Labs: All pertinent labs within the past 24 hours have been reviewed.  BMP:   Recent Labs   Lab 06/09/23  0351 06/10/23  0704   * 91   * 135*   K 4.1 4.1    105   CO2 23 24   BUN 15 20   CREATININE 1.3 1.0   CALCIUM 9.1 9.1   MG 1.6  --        CBC:   Recent Labs   Lab 06/09/23  0351 06/10/23  0704   WBC 8.35 6.86   HGB 8.2* 7.9*   HCT 25.3* 24.5*    207       CMP:   Recent Labs   Lab  06/09/23  0351 06/10/23  0704   * 135*   K 4.1 4.1    105   CO2 23 24   * 91   BUN 15 20   CREATININE 1.3 1.0   CALCIUM 9.1 9.1   PROT 7.1  --    ALBUMIN 2.9*  --    BILITOT 0.4  --    ALKPHOS 89  --    AST 24  --    ALT 8*  --    ANIONGAP 9 6*         Significant Imaging: I have reviewed all pertinent imaging results/findings within the past 24 hours.  I have reviewed and interpreted all pertinent imaging results/findings within the past 24 hours.

## 2023-06-10 NOTE — ANESTHESIA POST-OP PAIN MANAGEMENT
Acute Pain Service Progress Note    Ally Steiner is a 83 y.o., female, [id number].    Surgery:  INSERTION, INTRAMEDULLARY FARHAT, FEMUR, DISTAL, RETROGRADE - right. trios, synthes, c arm door side (Right: Leg Upper)    Post Op Day #: 2    Catheter type: perineural SIFI    Infusion type: Ropivacaine 0.2% 0.1 mL/hr + 15mL q3hr    Problem List:    Active Hospital Problems    Diagnosis  POA    *Periprosthetic fracture of the right distal femur [M97.9XXA]  Yes    Iron deficiency anemia [D50.9]  Yes    Fever [R50.9]  Yes    Acute blood loss anemia [D62]  Yes    Anemia of chronic disease [D63.8]  Yes    Closed fracture of right distal femur [S72.401A]  Yes    Seizure disorder [G40.909]  Yes    Stage 3a chronic kidney disease [N18.31]  Yes     Reviewed labs, plan for more labs next visit.       Essential hypertension [I10]  Yes     Diet controlled. bp well controlled today.       Late onset Alzheimer's disease with behavioral disturbance [G30.1, F02.818]  Yes     Established with neuropsych. Due for follow up ~10/2021. Family helps with ADLs. Does not drive        Resolved Hospital Problems    Diagnosis Date Resolved POA    Preop cardiovascular exam [Z01.810] 06/08/2023 Not Applicable       Subjective:     General appearance of alert, oriented, no complaints   Pain with rest: 1    Numbers   Pain with movement: 4    Numbers   Side Effects    1. Pruritis No    2. Nausea No    3. Motor Blockade No, 0=Ability to raise lower extremities off bed    4. Sedation No, S=sleep, easy to arouse    Objective:        Vitals   Vitals:    06/10/23 0734   BP: 138/67   Pulse: 84   Resp: 18   Temp: 37.3 °C (99.1 °F)        Labs    Admission on 06/07/2023   Component Date Value Ref Range Status    HIV 1/2 Ag/Ab 06/07/2023 Non-reactive  Non-reactive Final    Hepatitis C Ab 06/07/2023 Non-reactive  Non-reactive Final    Transferrin 06/07/2023 262  200 - 375 mg/dL Final    Prealbumin 06/07/2023 18 (L)  20 - 43 mg/dL Final     Procalcitonin 06/07/2023 0.02  <0.25 ng/mL Final    Magnesium 06/07/2023 1.6  1.6 - 2.6 mg/dL Final    Phosphorus 06/07/2023 3.2  2.7 - 4.5 mg/dL Final    Hemoglobin A1C 06/07/2023 5.6  4.0 - 5.6 % Final    Estimated Avg Glucose 06/07/2023 114  68 - 131 mg/dL Final    Specimen UA 06/07/2023 Urine, Catheterized   Final    Color, UA 06/07/2023 Yellow  Yellow, Straw, Linda Final    Appearance, UA 06/07/2023 Clear  Clear Final    pH, UA 06/07/2023 5.0  5.0 - 8.0 Final    Specific [place], UA 06/07/2023 1.015  1.005 - 1.030 Final    Protein, UA 06/07/2023 Negative  Negative Final    Glucose, UA 06/07/2023 Negative  Negative Final    Ketones, UA 06/07/2023 Negative  Negative Final    Bilirubin (UA) 06/07/2023 Negative  Negative Final    Occult Blood UA 06/07/2023 Negative  Negative Final    Nitrite, UA 06/07/2023 Negative  Negative Final    Leukocytes, UA 06/07/2023 1+ (A)  Negative Final    Vit D, 25-Hydroxy 06/07/2023 27 (L)  30 - 96 ng/mL Final    Group & Rh 06/07/2023 B POS   Final    Indirect [place] 06/07/2023 NEG   Final    Specimen Outdate 06/07/2023 06/10/2023 23:59   Final    Sodium 06/07/2023 138  136 - 145 mmol/L Final    Potassium 06/07/2023 4.6  3.5 - 5.1 mmol/L Final    Chloride 06/07/2023 104  95 - 110 mmol/L Final    CO2 06/07/2023 25  23 - 29 mmol/L Final    Glucose 06/07/2023 136 (H)  70 - 110 mg/dL Final    BUN 06/07/2023 16  8 - 23 mg/dL Final    Creatinine 06/07/2023 1.4  0.5 - 1.4 mg/dL Final    Calcium 06/07/2023 9.2  8.7 - 10.5 mg/dL Final    Total Protein 06/07/2023 7.2  6.0 - 8.4 g/dL Final    Albumin 06/07/2023 3.3 (L)  3.5 - 5.2 g/dL Final    Total Bilirubin 06/07/2023 0.5  0.1 - 1.0 mg/dL Final    Alkaline Phosphatase 06/07/2023 99  55 - 135 U/L Final    AST 06/07/2023 13  10 - 40 U/L Final    ALT 06/07/2023 8 (L)  10 - 44 U/L Final    Anion Gap 06/07/2023 9  8 - 16 mmol/L Final    eGFR 06/07/2023 37.6 (A)  >60 mL/min/1.73 m^2 Final    Magnesium 06/07/2023  1.6  1.6 - 2.6 mg/dL Final    Phosphorus 06/07/2023 3.2  2.7 - 4.5 mg/dL Final    WBC 06/07/2023 8.58  3.90 - 12.70 K/uL Final    RBC 06/07/2023 3.02 (L)  4.00 - 5.40 M/uL Final    Hemoglobin 06/07/2023 7.7 (L)  12.0 - 16.0 g/dL Final    Hematocrit 06/07/2023 24.8 (L)  37.0 - 48.5 % Final    MCV 06/07/2023 82  82 - 98 fL Final    MCH 06/07/2023 25.5 (L)  27.0 - 31.0 pg Final    MCHC 06/07/2023 31.0 (L)  32.0 - 36.0 g/dL Final    RDW 06/07/2023 15.1 (H)  11.5 - 14.5 % Final    Platelets 06/07/2023 246  150 - 450 K/uL Final    MPV 06/07/2023 11.5  9.2 - 12.9 fL Final    Immature Granulocytes 06/07/2023 0.2  0.0 - 0.5 % Final    Gran # (ANC) 06/07/2023 7.0  1.8 - 7.7 K/uL Final    Immature Grans (Abs) 06/07/2023 0.02  0.00 - 0.04 K/uL Final    Lymph # 06/07/2023 1.1  1.0 - 4.8 K/uL Final    Mono # 06/07/2023 0.5  0.3 - 1.0 K/uL Final    Eos # 06/07/2023 0.0  0.0 - 0.5 K/uL Final    Baso # 06/07/2023 0.01  0.00 - 0.20 K/uL Final    nRBC 06/07/2023 0  0 /100 WBC Final    Gran % 06/07/2023 81.8 (H)  38.0 - 73.0 % Final    Lymph % 06/07/2023 12.2 (L)  18.0 - 48.0 % Final    Mono % 06/07/2023 5.6  4.0 - 15.0 % Final    Eosinophil % 06/07/2023 0.1  0.0 - 8.0 % Final    Basophil % 06/07/2023 0.1  0.0 - 1.9 % Final    Differential Method 06/07/2023 Automated   Final    Iron 06/07/2023 37  30 - 160 ug/dL Final    Transferrin 06/07/2023 252  200 - 375 mg/dL Final    TIBC 06/07/2023 373  250 - 450 ug/dL Final    Saturated Iron 06/07/2023 10 (L)  20 - 50 % Final    Ferritin 06/07/2023 26  20.0 - 300.0 ng/mL Final    RBC, UA 06/07/2023 8 (H)  0 - 4 /hpf Final    WBC, UA 06/07/2023 3  0 - 5 /hpf Final    Bacteria 06/07/2023 Rare  None-Occ /hpf Final    Squam Epithel, UA 06/07/2023 1  /hpf Final    Hyaline Casts, UA 06/07/2023 3 (A)  0-1/lpf /lpf Final    Microscopic Comment 06/07/2023 SEE COMMENT   Final    BNP 06/07/2023 107 (H)  0 - 99 pg/mL Final    Troponin I 06/07/2023 0.006  0.000 - 0.026  ng/mL Final    POCT Glucose 06/07/2023 141 (H)  70 - 110 mg/dL Final    UNIT NUMBER 06/07/2023 [id number]   Final    Product Code 06/07/2023 [id number]   Final    DISPENSE STATUS 06/07/2023 TRANSFUSED   Final    CODING SYSTEM 06/07/2023 [id number]   Final    Unit Blood Type Code 06/07/2023 7300   Final    Unit Blood Type 06/07/2023 B POS   Final    Unit Expiration 06/07/2023 [id number]   Final    CROSSMATCH INTERPRETATION 06/07/2023 Compatible   Final    UNIT NUMBER 06/07/2023 [id number]   Preliminary    Product Code 06/07/2023 [id number]   Preliminary    DISPENSE STATUS 06/07/2023 CROSSMATCHED   Preliminary    CODING SYSTEM 06/07/2023 [id number]   Preliminary    Unit Blood Type Code 06/07/2023 7300   Preliminary    Unit Blood Type 06/07/2023 B POS   Preliminary    Unit Expiration 06/07/2023 [id number]   Preliminary    CROSSMATCH INTERPRETATION 06/07/2023 Compatible   Preliminary    POCT Glucose 06/07/2023 170 (H)  70 - 110 mg/dL Final    Sodium 06/08/2023 134 (L)  136 - 145 mmol/L Final    Potassium 06/08/2023 4.1  3.5 - 5.1 mmol/L Final    Chloride 06/08/2023 101  95 - 110 mmol/L Final    CO2 06/08/2023 24  23 - 29 mmol/L Final    Glucose 06/08/2023 122 (H)  70 - 110 mg/dL Final    BUN 06/08/2023 16  8 - 23 mg/dL Final    Creatinine 06/08/2023 1.1  0.5 - 1.4 mg/dL Final    Calcium 06/08/2023 9.1  8.7 - 10.5 mg/dL Final    Total Protein 06/08/2023 7.2  6.0 - 8.4 g/dL Final    Albumin 06/08/2023 3.1 (L)  3.5 - 5.2 g/dL Final    Total Bilirubin 06/08/2023 0.9  0.1 - 1.0 mg/dL Final    Alkaline Phosphatase 06/08/2023 102  55 - 135 U/L Final    AST 06/08/2023 15  10 - 40 U/L Final    ALT 06/08/2023 8 (L)  10 - 44 U/L Final    Anion Gap 06/08/2023 9  8 - 16 mmol/L Final    eGFR 06/08/2023 50.2 (A)  >60 mL/min/1.73 m^2 Final    Magnesium 06/08/2023 1.9  1.6 - 2.6 mg/dL Final    Phosphorus 06/08/2023 2.6 (L)  2.7 - 4.5 mg/dL Final    WBC 06/08/2023 7.93  3.90 - 12.70 K/uL Final    RBC  06/08/2023 3.62 (L)  4.00 - 5.40 M/uL Final    Hemoglobin 06/08/2023 9.6 (L)  12.0 - 16.0 g/dL Final    Hematocrit 06/08/2023 29.8 (L)  37.0 - 48.5 % Final    MCV 06/08/2023 82  82 - 98 fL Final    MCH 06/08/2023 26.5 (L)  27.0 - 31.0 pg Final    MCHC 06/08/2023 32.2  32.0 - 36.0 g/dL Final    RDW 06/08/2023 14.7 (H)  11.5 - 14.5 % Final    Platelets 06/08/2023 202  150 - 450 K/uL Final    MPV 06/08/2023 11.7  9.2 - 12.9 fL Final    Immature Granulocytes 06/08/2023 0.6 (H)  0.0 - 0.5 % Final    Gran # (ANC) 06/08/2023 5.6  1.8 - 7.7 K/uL Final    Immature Grans (Abs) 06/08/2023 0.05 (H)  0.00 - 0.04 K/uL Final    Lymph # 06/08/2023 1.3  1.0 - 4.8 K/uL Final    Mono # 06/08/2023 0.7  0.3 - 1.0 K/uL Final    Eos # 06/08/2023 0.3  0.0 - 0.5 K/uL Final    Baso # 06/08/2023 0.03  0.00 - 0.20 K/uL Final    nRBC 06/08/2023 0  0 /100 WBC Final    Gran % 06/08/2023 70.0  38.0 - 73.0 % Final    Lymph % 06/08/2023 16.8 (L)  18.0 - 48.0 % Final    Mono % 06/08/2023 8.4  4.0 - 15.0 % Final    Eosinophil % 06/08/2023 3.8  0.0 - 8.0 % Final    Basophil % 06/08/2023 0.4  0.0 - 1.9 % Final    Differential Method 06/08/2023 Automated   Final    CRP 06/08/2023 108.3 (H)  0.0 - 8.2 mg/L Final    Sodium 06/09/2023 133 (L)  136 - 145 mmol/L Final    Potassium 06/09/2023 4.1  3.5 - 5.1 mmol/L Final    Chloride 06/09/2023 101  95 - 110 mmol/L Final    CO2 06/09/2023 23  23 - 29 mmol/L Final    Glucose 06/09/2023 120 (H)  70 - 110 mg/dL Final    BUN 06/09/2023 15  8 - 23 mg/dL Final    Creatinine 06/09/2023 1.3  0.5 - 1.4 mg/dL Final    Calcium 06/09/2023 9.1  8.7 - 10.5 mg/dL Final    Total Protein 06/09/2023 7.1  6.0 - 8.4 g/dL Final    Albumin 06/09/2023 2.9 (L)  3.5 - 5.2 g/dL Final    Total Bilirubin 06/09/2023 0.4  0.1 - 1.0 mg/dL Final    Alkaline Phosphatase 06/09/2023 89  55 - 135 U/L Final    AST 06/09/2023 24  10 - 40 U/L Final    ALT 06/09/2023 8 (L)  10 - 44 U/L Final    Anion Gap  06/09/2023 9  8 - 16 mmol/L Final    eGFR 06/09/2023 41.1 (A)  >60 mL/min/1.73 m^2 Final    Magnesium 06/09/2023 1.6  1.6 - 2.6 mg/dL Final    Phosphorus 06/09/2023 2.6 (L)  2.7 - 4.5 mg/dL Final    WBC 06/09/2023 8.35  3.90 - 12.70 K/uL Final    RBC 06/09/2023 3.07 (L)  4.00 - 5.40 M/uL Final    Hemoglobin 06/09/2023 8.2 (L)  12.0 - 16.0 g/dL Final    Hematocrit 06/09/2023 25.3 (L)  37.0 - 48.5 % Final    MCV 06/09/2023 82  82 - 98 fL Final    MCH 06/09/2023 26.7 (L)  27.0 - 31.0 pg Final    MCHC 06/09/2023 32.4  32.0 - 36.0 g/dL Final    RDW 06/09/2023 15.3 (H)  11.5 - 14.5 % Final    Platelets 06/09/2023 197  150 - 450 K/uL Final    MPV 06/09/2023 11.4  9.2 - 12.9 fL Final    Immature Granulocytes 06/09/2023 0.6 (H)  0.0 - 0.5 % Final    Gran # (ANC) 06/09/2023 6.0  1.8 - 7.7 K/uL Final    Immature Grans (Abs) 06/09/2023 0.05 (H)  0.00 - 0.04 K/uL Final    Lymph # 06/09/2023 1.2  1.0 - 4.8 K/uL Final    Mono # 06/09/2023 1.0  0.3 - 1.0 K/uL Final    Eos # 06/09/2023 0.1  0.0 - 0.5 K/uL Final    Baso # 06/09/2023 0.01  0.00 - 0.20 K/uL Final    nRBC 06/09/2023 0  0 /100 WBC Final    Gran % 06/09/2023 72.2  38.0 - 73.0 % Final    Lymph % 06/09/2023 14.6 (L)  18.0 - 48.0 % Final    Mono % 06/09/2023 11.4  4.0 - 15.0 % Final    Eosinophil % 06/09/2023 1.1  0.0 - 8.0 % Final    Basophil % 06/09/2023 0.1  0.0 - 1.9 % Final    Differential Method 06/09/2023 Automated   Final    Vancomycin-Trough 06/09/2023 14.9  10.0 - 22.0 ug/mL Final    Sodium 06/10/2023 135 (L)  136 - 145 mmol/L Final    Potassium 06/10/2023 4.1  3.5 - 5.1 mmol/L Final    Chloride 06/10/2023 105  95 - 110 mmol/L Final    CO2 06/10/2023 24  23 - 29 mmol/L Final    Glucose 06/10/2023 91  70 - 110 mg/dL Final    BUN 06/10/2023 20  8 - 23 mg/dL Final    Creatinine 06/10/2023 1.0  0.5 - 1.4 mg/dL Final    Calcium 06/10/2023 9.1  8.7 - 10.5 mg/dL Final    Anion Gap 06/10/2023 6 (L)  8 - 16 mmol/L Final    eGFR  06/10/2023 56.2 (A)  >60 mL/min/1.73 m^2 Final        Meds   Current Facility-Administered Medications   Medication Dose Route Frequency Provider Last Rate Last Admin    0.9%  NaCl infusion (for blood administration)   Intravenous Q24H PRN Cristiane Martell MD        acetaminophen tablet 1,000 mg  1,000 mg Oral Q8H Sergio Mao MD   1,000 mg at 06/09/23 2122    apixaban tablet 2.5 mg  2.5 mg Oral BID Cristiane aMrtell MD   2.5 mg at 06/09/23 2122    dextrose 10% bolus 125 mL 125 mL  12.5 g Intravenous PRN Carlos Bess MD        dextrose 10% bolus 250 mL 250 mL  25 g Intravenous PRN Carlos Bess MD        dextrose 40 % gel 15,000 mg  15 g Oral PRN Carlos Bess MD        dextrose 40 % gel 30,000 mg  30 g Oral PRN Carlos Bess MD        diphenhydrAMINE capsule 25 mg  25 mg Oral Nightly PRN Viki cMneill MD        ferrous sulfate tablet 1 each  1 tablet Oral BID Viki Mcneill MD   1 each at 06/09/23 2121    glucagon (human recombinant) injection 1 mg  1 mg Intramuscular PRN Carlos Bess MD        levETIRAcetam tablet 250 mg  250 mg Oral Nightly Carlos Bess MD   250 mg at 06/09/23 2122    melatonin tablet 6 mg  6 mg Oral Nightly PRN Noemí Bravo NP   6 mg at 06/09/23 2121    methocarbamoL tablet 500 mg  500 mg Oral Q6H PRN Sergio Mao MD   500 mg at 06/09/23 0208    mirtazapine tablet 15 mg  15 mg Oral QHS Carlos Bess MD   15 mg at 06/09/23 2121    multivitamin tablet  1 tablet Oral Daily Carlos Bess MD   1 tablet at 06/09/23 1000    mupirocin 2 % ointment   Nasal BID Maria Guadalupe Fang MD   Given at 06/09/23 2123    naloxone 0.4 mg/mL injection 0.02 mg  0.02 mg Intravenous PRN Carlos Bess MD        ondansetron injection 4 mg  4 mg Intravenous Q8H PRN Carlos Bess MD   4 mg at 06/08/23 0949    ROPIvacaine (PF) 2 mg/ml (0.2%) solution  0.1 mL/hr Perineural Continuous Moira Kolb MD 0.1 mL/hr at 06/09/23 2019 0.1 mL/hr at 06/09/23 2019    senna-docusate 8.6-50 mg per  tablet 1 tablet  1 tablet Oral Daily PRN Nessa Sanchez MD   1 tablet at 06/09/23 2124    sodium chloride 0.9% flush 10 mL  10 mL Intravenous Q12H PRN Carlos Bess MD        vitamin D 1000 units tablet 1,000 Units  1,000 Units Oral Daily Maria Guadalupe Fang MD   1,000 Units at 06/09/23 1000        Anticoagulant dose Eliquis 2.5 mg BID    Assessment:     Patient reports increased pain 8/10. Clinician bolused 15mls from bag. Reports improvement after bolus.    Plan:     Patient doing well, continue present treatment.    1) Continue R SiFi PNC  2) Continue tylenol 1g q8hr  3) Continue Robaxin 500 mg q6hr PRN    Case discussed with staff, Dr. Perea; final recommendations per attestation above.     Thank you for your consult and allowing us to participate in the care of this patient. We will continue to follow along. Please call the Acute Pain Service/Anesthesia if you have any questions or concerns.    Todd Davila MD  Department of Anesthesiology  [facility]  06/10/2023 8:14 AM

## 2023-06-11 LAB
ABO + RH BLD: NORMAL
ANION GAP SERPL CALC-SCNC: 10 MMOL/L (ref 8–16)
BASOPHILS # BLD AUTO: 0.04 K/UL (ref 0–0.2)
BASOPHILS NFR BLD: 0.6 % (ref 0–1.9)
BLD GP AB SCN CELLS X3 SERPL QL: NORMAL
BLD PROD TYP BPU: NORMAL
BLOOD UNIT EXPIRATION DATE: NORMAL
BLOOD UNIT TYPE CODE: 7300
BLOOD UNIT TYPE: NORMAL
BUN SERPL-MCNC: 33 MG/DL (ref 8–23)
CALCIUM SERPL-MCNC: 8.7 MG/DL (ref 8.7–10.5)
CHLORIDE SERPL-SCNC: 102 MMOL/L (ref 95–110)
CO2 SERPL-SCNC: 23 MMOL/L (ref 23–29)
CODING SYSTEM: NORMAL
CREAT SERPL-MCNC: 1 MG/DL (ref 0.5–1.4)
CROSSMATCH INTERPRETATION: NORMAL
DIFFERENTIAL METHOD: ABNORMAL
DISPENSE STATUS: NORMAL
EOSINOPHIL # BLD AUTO: 0.3 K/UL (ref 0–0.5)
EOSINOPHIL NFR BLD: 3.8 % (ref 0–8)
ERYTHROCYTE [DISTWIDTH] IN BLOOD BY AUTOMATED COUNT: 16.7 % (ref 11.5–14.5)
EST. GFR  (NO RACE VARIABLE): 56.2 ML/MIN/1.73 M^2
GLUCOSE SERPL-MCNC: 101 MG/DL (ref 70–110)
HCT VFR BLD AUTO: 24 % (ref 37–48.5)
HGB BLD-MCNC: 7.2 G/DL (ref 12–16)
IMM GRANULOCYTES # BLD AUTO: 0.02 K/UL (ref 0–0.04)
IMM GRANULOCYTES NFR BLD AUTO: 0.3 % (ref 0–0.5)
LYMPHOCYTES # BLD AUTO: 2 K/UL (ref 1–4.8)
LYMPHOCYTES NFR BLD: 29.2 % (ref 18–48)
MCH RBC QN AUTO: 26.4 PG (ref 27–31)
MCHC RBC AUTO-ENTMCNC: 30 G/DL (ref 32–36)
MCV RBC AUTO: 88 FL (ref 82–98)
MONOCYTES # BLD AUTO: 0.7 K/UL (ref 0.3–1)
MONOCYTES NFR BLD: 10 % (ref 4–15)
NEUTROPHILS # BLD AUTO: 3.8 K/UL (ref 1.8–7.7)
NEUTROPHILS NFR BLD: 56.1 % (ref 38–73)
NRBC BLD-RTO: 0 /100 WBC
NUM UNITS TRANS PACKED RBC: NORMAL
PLATELET # BLD AUTO: 232 K/UL (ref 150–450)
PMV BLD AUTO: 11.7 FL (ref 9.2–12.9)
POTASSIUM SERPL-SCNC: 3.8 MMOL/L (ref 3.5–5.1)
RBC # BLD AUTO: 2.73 M/UL (ref 4–5.4)
SODIUM SERPL-SCNC: 135 MMOL/L (ref 136–145)
SPECIMEN OUTDATE: NORMAL
WBC # BLD AUTO: 6.78 K/UL (ref 3.9–12.7)

## 2023-06-11 PROCEDURE — 25000003 PHARM REV CODE 250: Performed by: STUDENT IN AN ORGANIZED HEALTH CARE EDUCATION/TRAINING PROGRAM

## 2023-06-11 PROCEDURE — 36415 COLL VENOUS BLD VENIPUNCTURE: CPT | Performed by: HOSPITALIST

## 2023-06-11 PROCEDURE — 25000003 PHARM REV CODE 250: Performed by: HOSPITALIST

## 2023-06-11 PROCEDURE — 94761 N-INVAS EAR/PLS OXIMETRY MLT: CPT

## 2023-06-11 PROCEDURE — 85025 COMPLETE CBC W/AUTO DIFF WBC: CPT | Performed by: HOSPITALIST

## 2023-06-11 PROCEDURE — 11000001 HC ACUTE MED/SURG PRIVATE ROOM

## 2023-06-11 PROCEDURE — 97530 THERAPEUTIC ACTIVITIES: CPT | Mod: CQ

## 2023-06-11 PROCEDURE — 36430 TRANSFUSION BLD/BLD COMPNT: CPT

## 2023-06-11 PROCEDURE — 97110 THERAPEUTIC EXERCISES: CPT

## 2023-06-11 PROCEDURE — 25000003 PHARM REV CODE 250: Performed by: NURSE PRACTITIONER

## 2023-06-11 PROCEDURE — 25000003 PHARM REV CODE 250: Performed by: INTERNAL MEDICINE

## 2023-06-11 PROCEDURE — P9016 RBC LEUKOCYTES REDUCED: HCPCS | Performed by: HOSPITALIST

## 2023-06-11 PROCEDURE — 99231 PR SUBSEQUENT HOSPITAL CARE,LEVL I: ICD-10-PCS | Mod: ,,, | Performed by: ANESTHESIOLOGY

## 2023-06-11 PROCEDURE — 86920 COMPATIBILITY TEST SPIN: CPT | Performed by: HOSPITALIST

## 2023-06-11 PROCEDURE — 86900 BLOOD TYPING SEROLOGIC ABO: CPT | Performed by: HOSPITALIST

## 2023-06-11 PROCEDURE — 99233 PR SUBSEQUENT HOSPITAL CARE,LEVL III: ICD-10-PCS | Mod: ,,, | Performed by: HOSPITALIST

## 2023-06-11 PROCEDURE — 80048 BASIC METABOLIC PNL TOTAL CA: CPT | Performed by: HOSPITALIST

## 2023-06-11 PROCEDURE — 99233 SBSQ HOSP IP/OBS HIGH 50: CPT | Mod: ,,, | Performed by: HOSPITALIST

## 2023-06-11 PROCEDURE — 99231 SBSQ HOSP IP/OBS SF/LOW 25: CPT | Mod: ,,, | Performed by: ANESTHESIOLOGY

## 2023-06-11 PROCEDURE — 63600175 PHARM REV CODE 636 W HCPCS

## 2023-06-11 RX ORDER — ACETAMINOPHEN 500 MG
1000 TABLET ORAL EVERY 8 HOURS
Refills: 0
Start: 2023-06-11

## 2023-06-11 RX ORDER — FERROUS SULFATE 220 (44)/5
220 SOLUTION, ORAL ORAL 2 TIMES DAILY
Refills: 0
Start: 2023-06-11

## 2023-06-11 RX ORDER — HYDROCODONE BITARTRATE AND ACETAMINOPHEN 500; 5 MG/1; MG/1
TABLET ORAL
Status: DISCONTINUED | OUTPATIENT
Start: 2023-06-11 | End: 2023-06-12 | Stop reason: HOSPADM

## 2023-06-11 RX ORDER — METHOCARBAMOL 500 MG/1
500 TABLET, FILM COATED ORAL EVERY 6 HOURS PRN
Qty: 45 TABLET | Refills: 0 | Status: SHIPPED | OUTPATIENT
Start: 2023-06-11

## 2023-06-11 RX ORDER — CHOLECALCIFEROL (VITAMIN D3) 25 MCG
1000 TABLET ORAL DAILY
Qty: 60 TABLET | Refills: 1 | Status: SHIPPED | OUTPATIENT
Start: 2023-06-12

## 2023-06-11 RX ADMIN — ROPIVACAINE HYDROCHLORIDE 0.1 ML/HR: 2 INJECTION, SOLUTION EPIDURAL; INFILTRATION at 05:06

## 2023-06-11 RX ADMIN — APIXABAN 2.5 MG: 2.5 TABLET, FILM COATED ORAL at 08:06

## 2023-06-11 RX ADMIN — FERROUS SULFATE TAB 325 MG (65 MG ELEMENTAL FE) 1 EACH: 325 (65 FE) TAB at 08:06

## 2023-06-11 RX ADMIN — METHOCARBAMOL 500 MG: 500 TABLET ORAL at 08:06

## 2023-06-11 RX ADMIN — THERA TABS 1 TABLET: TAB at 09:06

## 2023-06-11 RX ADMIN — FERROUS SULFATE TAB 325 MG (65 MG ELEMENTAL FE) 1 EACH: 325 (65 FE) TAB at 09:06

## 2023-06-11 RX ADMIN — Medication 6 MG: at 08:06

## 2023-06-11 RX ADMIN — LEVETIRACETAM 250 MG: 250 TABLET, FILM COATED ORAL at 08:06

## 2023-06-11 RX ADMIN — GLYCERIN 1 SUPPOSITORY: 2 SUPPOSITORY RECTAL at 12:06

## 2023-06-11 RX ADMIN — METHOCARBAMOL 500 MG: 500 TABLET ORAL at 03:06

## 2023-06-11 RX ADMIN — CHOLECALCIFEROL TAB 25 MCG (1000 UNIT) 1000 UNITS: 25 TAB at 09:06

## 2023-06-11 RX ADMIN — APIXABAN 2.5 MG: 2.5 TABLET, FILM COATED ORAL at 09:06

## 2023-06-11 RX ADMIN — ACETAMINOPHEN 1000 MG: 500 TABLET ORAL at 01:06

## 2023-06-11 RX ADMIN — MUPIROCIN: 20 OINTMENT TOPICAL at 09:06

## 2023-06-11 RX ADMIN — ACETAMINOPHEN 1000 MG: 500 TABLET ORAL at 05:06

## 2023-06-11 RX ADMIN — DIPHENHYDRAMINE HYDROCHLORIDE 25 MG: 25 CAPSULE ORAL at 08:06

## 2023-06-11 RX ADMIN — MUPIROCIN: 20 OINTMENT TOPICAL at 08:06

## 2023-06-11 NOTE — PLAN OF CARE
Pt resting in bed comfortably. PIV line intact and free of infection and irritation. Fall precautions maintained, no falls noted. Call light within reach, bed locked and in lowest position. Non-skid socks on while out of bed. Patient instructed to call for assistance. Skin integrity maintained as patient is independent with frequent repositioning. Pt received 1 unit RBCs today, tolerated well. No complaints of pain to surgical leg. Progressing towards goals. Will continue to monitor and follow plan of care.

## 2023-06-11 NOTE — PLAN OF CARE
[facility]      HOME HEALTH ORDERS  FACE TO FACE ENCOUNTER    Patient Name: Ally Steiner  YOB: 1940    PCP: Josh Fajardo MD   PCP Address: [facility] [address] / [place] [address]  PCP Phone Number: [phone]  PCP Fax: [phone]    Encounter Date: 6/6/23    Admit to Home Health    Diagnoses:  Active Hospital Problems    Diagnosis  POA    *Periprosthetic fracture of the right distal femur [M97.9XXA]  Yes    Iron deficiency anemia [D50.9]  Yes    Fever [R50.9]  Yes    Acute blood loss anemia [D62]  Yes    Anemia of chronic disease [D63.8]  Yes    Closed fracture of right distal femur [S72.401A]  Yes    Seizure disorder [G40.909]  Yes    Stage 3a chronic kidney disease [N18.31]  Yes     Reviewed labs, plan for more labs next visit.       Essential hypertension [I10]  Yes     Diet controlled. bp well controlled today.       Late onset Alzheimer's disease with behavioral disturbance [G30.1, F02.818]  Yes     Established with neuropsych. Due for follow up ~10/2021. Family helps with ADLs. Does not drive        Resolved Hospital Problems    Diagnosis Date Resolved POA    Preop cardiovascular exam [Z01.810] 06/08/2023 Not Applicable       Follow Up Appointments:  Future Appointments   Date Time Provider Department Center   6/22/2023 10:30 AM Servando Barragan NP [facility]   7/20/2023  8:45 AM Sharon Lee PA-C [facility]   9/20/2023 10:45 AM Soy Pollock,  [facility]       Allergies:Review of patient's allergies indicates:  No Known Allergies    Medications: Review discharge medications with patient and family and provide education.    Current Facility-Administered Medications   Medication Dose Route Frequency Provider Last Rate Last Admin    0.9%  NaCl infusion (for blood administration)   Intravenous Q24H PRN Cristiane Martell MD        0.9%  NaCl infusion (for blood administration)   Intravenous Q24H PRN Vkii MCCRAY  MD Osito        acetaminophen tablet 1,000 mg  1,000 mg Oral Q8H Sergio Mao MD   1,000 mg at 06/11/23 1302    apixaban tablet 2.5 mg  2.5 mg Oral BID Cristiane Martell MD   2.5 mg at 06/11/23 0926    dextrose 10% bolus 125 mL 125 mL  12.5 g Intravenous PRN Carlos Bess MD        dextrose 10% bolus 250 mL 250 mL  25 g Intravenous PRN Carlos Bess MD        dextrose 40 % gel 15,000 mg  15 g Oral PRN Carlos Bess MD        dextrose 40 % gel 30,000 mg  30 g Oral PRN Carlos Bess MD        diphenhydrAMINE capsule 25 mg  25 mg Oral Nightly PRN Viki Mcneill MD   25 mg at 06/10/23 2124    ferrous sulfate tablet 1 each  1 tablet Oral BID Viki Mcneill MD   1 each at 06/11/23 0926    glucagon (human recombinant) injection 1 mg  1 mg Intramuscular PRN Carlos Bess MD        levETIRAcetam tablet 250 mg  250 mg Oral Nightly Carlos Bess MD   250 mg at 06/10/23 2123    melatonin tablet 6 mg  6 mg Oral Nightly PRN Noemí Brvao NP   6 mg at 06/10/23 2122    methocarbamoL tablet 500 mg  500 mg Oral Q6H PRN Sergio Mao MD   500 mg at 06/11/23 0311    mirtazapine tablet 15 mg  15 mg Oral QHS Carlos Bess MD   15 mg at 06/09/23 2121    multivitamin tablet  1 tablet Oral Daily Carlos Bess MD   1 tablet at 06/11/23 0926    mupirocin 2 % ointment   Nasal BID Maria Guadalupe Fang MD   Given at 06/11/23 0926    naloxone 0.4 mg/mL injection 0.02 mg  0.02 mg Intravenous PRN Carlos Bess MD        ondansetron injection 4 mg  4 mg Intravenous Q8H PRN Carlos Bess MD   4 mg at 06/08/23 0949    ROPIvacaine (PF) 2 mg/ml (0.2%) solution  0.1 mL/hr Perineural Continuous Moira Kolb MD 0.1 mL/hr at 06/11/23 0521 0.1 mL/hr at 06/11/23 0521    senna-docusate 8.6-50 mg per tablet 1 tablet  1 tablet Oral Daily PRN Nessa Sanchez MD   1 tablet at 06/10/23 2125    sodium chloride 0.9% flush 10 mL  10 mL Intravenous Q12H PRN Carlos Bess MD        vitamin D 1000 units tablet 1,000 Units  1,000 Units Oral Daily Maria Guadalupe CORTEZ  MD Leoncio   1,000 Units at 06/11/23 0951     Current Discharge Medication List        START taking these medications    Details   acetaminophen (TYLENOL) 500 MG tablet Take 2 tablets (1,000 mg total) by mouth every 8 (eight) hours.  Refills: 0      apixaban (ELIQUIS) 2.5 mg Tab Take 1 tablet (2.5 mg total) by mouth 2 (two) times daily.  Qty: 80 tablet, Refills: 0      methocarbamoL (ROBAXIN) 500 MG Tab Take 1 tablet (500 mg total) by mouth every 6 (six) hours as needed (pain scale 4-7).  Qty: 45 tablet, Refills: 0      vitamin D (VITAMIN D3) 1000 units Tab Take 1 tablet (1,000 Units total) by mouth once daily.  Qty: 60 tablet, Refills: 1           CONTINUE these medications which have CHANGED    Details   ferrous sulfate 220 mg (44 mg iron)/5 mL solution Take 5 mLs (220 mg total) by mouth 2 (two) times daily.  Refills: 0           CONTINUE these medications which have NOT CHANGED    Details   furosemide (LASIX) 20 MG tablet TAKE 1 TABLET BY MOUTH ONCE DAILY AS NEEDED FOR EDEMA      levETIRAcetam (KEPPRA) 250 MG Tab Take 1 tablet (250 mg total) by mouth nightly.  Qty: 30 tablet, Refills: 5      mirtazapine (REMERON) 15 MG tablet Take 15 mg by mouth every evening.      multivitamin capsule Take 1 capsule by mouth once daily.      potassium chloride SA (K-DUR,KLOR-CON M) 10 MEQ tablet Take 10 mEq by mouth daily as needed.           STOP taking these medications       venlafaxine (EFFEXOR-XR) 37.5 MG 24 hr capsule Comments:   Reason for Stopping:                 I have seen and examined this patient within the last 30 days. My clinical findings that support the need for the home health skilled services and home bound status are the following:no   Weakness/numbness causing balance and gait disturbance due to Fracture and Weakness/Debility making it taxing to leave home.  Requiring assistive device to leave home due to unsteady gait caused by  Fracture and Weakness/Debility.     Diet:   regular diet      Referrals/  Consults  Physical Therapy to evaluate and treat. Evaluate for home safety and equipment needs; Establish/upgrade home exercise program. Perform / instruct on therapeutic exercises, gait training, transfer training, and Range of Motion.  Occupational Therapy to evaluate and treat. Evaluate home environment for safety and equipment needs. Perform/Instruct on transfers, ADL training, ROM, and therapeutic exercises.  Aide to provide assistance with personal care, ADLs, and vital signs.    Activities:   NWB x 8 weeks to RLE. ROM of knee  as toleratedokay.    Nursing:   Agency to admit patient within 24 hours of hospital discharge unless specified on physician order or at patient request    SN to complete comprehensive assessment including routine vital signs. Instruct on disease process and s/s of complications to report to MD. Review/verify medication list sent home with the patient at time of discharge  and instruct patient/caregiver as needed. Frequency may be adjusted depending on start of care date.     Skilled nurse to perform up to 3 visits PRN for symptoms related to diagnosis    Notify MD if SBP > 160 or < 90; DBP > 90 or < 50; HR > 120 or < 50; Temp > 101; O2 < 88%; Other:       Ok to schedule additional visits based on staff availability and patient request on consecutive days within the home health episode.    When multiple disciplines ordered:    Start of Care occurs on Sunday - Wednesday schedule remaining discipline evaluations as ordered on separate consecutive days following the start of care.    Thursday SOC -schedule subsequent evaluations Friday and Monday the following week.     Friday - Saturday SOC - schedule subsequent discipline evaluations on consecutive days starting Monday of the following week.    For all post-discharge communication and subsequent orders please contact patient's primary care physician. If unable to reach primary care physician or do not receive response within 30 minutes,  please contact [facility] on call line for clinical staff order clarification    Miscellaneous   Routine Skin for Bedridden Patients: Instruct patient/caregiver to apply moisture barrier cream to all skin folds and wet areas in perineal area daily and after baths and all bowel movements.    Home Health Aide:  Nursing Three times weekly, Physical Therapy Three times weekly, and Occupational Therapy Three times weekly    Wound Care Orders  Keep bandages to RLE in place until ortho follow up. Do not get wet or immerse in water. Reinforce PRN if fraying.    I certify that this patient is confined to her home and needs intermittent skilled nursing care, physical therapy, and occupational therapy.

## 2023-06-11 NOTE — ASSESSMENT & PLAN NOTE
expcted with fracture and inflammation on top of chronic anemia, given 1 U PRBCS prior to surgery given hg 7.7. and continue to closely monitor-->8.2 now--7.9--7.2 and will give 1 U to darin her up today prior to discharge. Known SARAH and on iron at home

## 2023-06-11 NOTE — PROGRESS NOTES
[facility]  Orthopedics  Progress Note    Patient Name: Ally Steiner  MRN: [id number]  Admission Date: 6/7/2023  Hospital Length of Stay: 4 days  Attending Provider: Viki Mcneill MD  Primary Care Provider: Josh Fajardo MD  Follow-up For: Procedure(s) (LRB):  INSERTION, INTRAMEDULLARY FARHAT, FEMUR, DISTAL, RETROGRADE - right. trios, synthes, c arm door side (Right)    Post-Operative Day: 2 Day Post-Op  Subjective:     Principal Problem:Periprosthetic fracture around internal prosthetic joint    Principal Orthopedic Problem: Same    Interval History: NAEON, patient stable, pain controlled. No acute complaints this morning. Dressings CDI.     Review of patient's allergies indicates:  No Known Allergies    Current Facility-Administered Medications   Medication    0.9%  NaCl infusion (for blood administration)    acetaminophen tablet 1,000 mg    apixaban tablet 2.5 mg    dextrose 10% bolus 125 mL 125 mL    dextrose 10% bolus 250 mL 250 mL    dextrose 40 % gel 15,000 mg    dextrose 40 % gel 30,000 mg    diphenhydrAMINE capsule 25 mg    ferrous sulfate tablet 1 each    glucagon (human recombinant) injection 1 mg    levETIRAcetam tablet 250 mg    melatonin tablet 6 mg    methocarbamoL tablet 500 mg    mirtazapine tablet 15 mg    multivitamin tablet    mupirocin 2 % ointment    naloxone 0.4 mg/mL injection 0.02 mg    ondansetron injection 4 mg    ROPIvacaine (PF) 2 mg/ml (0.2%) solution    senna-docusate 8.6-50 mg per tablet 1 tablet    sodium chloride 0.9% flush 10 mL    vitamin D 1000 units tablet 1,000 Units     Objective:     Vital Signs (Most Recent):  Temp: 98.2 °F (36.8 °C) (06/11/23 0725)  Pulse: 72 (06/11/23 0725)  Resp: 18 (06/11/23 0725)  BP: (!) 144/67 (06/11/23 0725)  SpO2: 100 % (06/11/23 0725) Vital Signs (24h Range):  Temp:  [97.2 °F (36.2 °C)-99.7 °F (37.6 °C)] 98.2 °F (36.8 °C)  Pulse:  [72-84] 72  Resp:  [16-20] 18  SpO2:  [95 %-100 %] 100 %  BP: (121-187)/(58-88) 144/67      Weight: 49.8 kg (109 lb 12.6 oz)  Height: 5' (152.4 cm)  Body mass index is 21.44 kg/m².      Intake/Output Summary (Last 24 hours) at 6/11/2023 0815  Last data filed at 6/11/2023 0539  Gross per 24 hour   Intake 560 ml   Output 1300 ml   Net -740 ml       Physical Exam  Ortho/SPM Exam  Alert, confused     LLE  Surgical dressings at thigh cdi  Grossly NVI, dp pulse 2+    Significant Labs: A1C:   Recent Labs   Lab 06/07/23  0058   HGBA1C 5.6     CBC:   Recent Labs   Lab 06/10/23  0704 06/11/23  0346   WBC 6.86 6.78   HGB 7.9* 7.2*   HCT 24.5* 24.0*    232     CMP:   Recent Labs   Lab 06/10/23  0704 06/11/23  0346   * 135*   K 4.1 3.8    102   CO2 24 23   GLU 91 101   BUN 20 33*   CREATININE 1.0 1.0   CALCIUM 9.1 8.7   ANIONGAP 6* 10     All pertinent labs within the past 24 hours have been reviewed.      Significant Imaging: I have reviewed and interpreted all pertinent imaging results/findings.   Assessment/Plan:     * Periprosthetic fracture of the right distal femur  Patient is a 83-year-old female who presents with a right distal femur periprosthetic fracture after a ground level fall 6/6/23  She ambulates independently in her home but uses a wheelchair for distances due to dementia.  She has a past medical history significant only for dementia.  She takes no anticoagulation at home.  She lives with her daughter.      Sp operative fixation 6/8/23 w Dr Min    Multimodal pain regimen  TTWB RLE   PT/OT daily  SCDs, eliquis 2.5 bid for DVT ppx  Monitor hb 7.3 this AM  Hickman discontinued   FU 2 wks w ortho        Juarez Champion MD  Orthopedics  [facility] - Surgery

## 2023-06-11 NOTE — SUBJECTIVE & OBJECTIVE
Interval history- she is awake and in a good mood. Family at bedside and she was jokign with examiner and her family during exam. BM this morning and reports no pain unless you ask her as she hsant even thought about it. She was kissing her stuffed animal at the bedside and in good spirits. Hg slight downtick with known anemia on admit to lower 7's from higher 7's and so will transfuse 1 U today to darin her up in preparation for discharge likely tmorrow if doing well. Has known SARAH and takes liquid iron at home and on iron while here as well. Plan to set up HH with DME with family at home to help with adl's and care 4/7 per preferences and patient is happy about that also.         Review of patient's allergies indicates:  No Known Allergies    No current facility-administered medications on file prior to encounter.     Current Outpatient Medications on File Prior to Encounter   Medication Sig    ferrous sulfate 220 mg (44 mg iron)/5 mL solution Take 220 mg by mouth.    furosemide (LASIX) 20 MG tablet TAKE 1 TABLET BY MOUTH ONCE DAILY AS NEEDED FOR EDEMA    levETIRAcetam (KEPPRA) 250 MG Tab Take 1 tablet (250 mg total) by mouth nightly. (Patient not taking: Reported on 3/15/2023)    mirtazapine (REMERON) 15 MG tablet Take 15 mg by mouth every evening.    multivitamin capsule Take 1 capsule by mouth once daily.    potassium chloride SA (K-DUR,KLOR-CON M) 10 MEQ tablet Take 10 mEq by mouth daily as needed.    venlafaxine (EFFEXOR-XR) 37.5 MG 24 hr capsule Take 1 capsule (37.5 mg total) by mouth once daily. (Patient not taking: Reported on 3/15/2023)     Family History    None       Tobacco Use    Smoking status: Never    Smokeless tobacco: Never   Substance and Sexual Activity    Alcohol use: Never    Drug use: Never    Sexual activity: Not on file     Review of Systems   Reason unable to perform ROS: Patient unable to answer questions appropriately.   Objective:     Vital Signs (Most Recent):  Temp: 98.2 °F (36.8 °C)  (06/11/23 0725)  Pulse: 72 (06/11/23 0725)  Resp: 18 (06/11/23 0725)  BP: (!) 144/67 (06/11/23 0725)  SpO2: 100 % (06/11/23 0725) Vital Signs (24h Range):  Temp:  [97.2 °F (36.2 °C)-99.7 °F (37.6 °C)] 98.2 °F (36.8 °C)  Pulse:  [72-84] 72  Resp:  [16-20] 18  SpO2:  [95 %-100 %] 100 %  BP: (121-187)/(58-88) 144/67     Weight: 49.8 kg (109 lb 12.6 oz)  Body mass index is 21.44 kg/m².     Physical Exam  Constitutional:       General: She is not in acute distress.     Appearance: Normal appearance. She is not ill-appearing.      Comments: Awake and in good spirits. family at bedside.   HENT:      Head: Normocephalic and atraumatic.      Right Ear: Tympanic membrane normal.      Left Ear: Tympanic membrane normal.   Cardiovascular:      Rate and Rhythm: Normal rate and regular rhythm.      Pulses: Normal pulses.      Heart sounds: Normal heart sounds. No murmur heard.  Pulmonary:      Effort: No respiratory distress.      Breath sounds: No wheezing.   Abdominal:      General: Abdomen is flat. Bowel sounds are normal.      Palpations: Abdomen is soft.   Musculoskeletal:         General: No swelling.      Cervical back: Normal range of motion and neck supple.      Comments: R knee with bandages and ropivicaine   Skin:     General: Skin is warm and dry.      Capillary Refill: Capillary refill takes 2 to 3 seconds.   Neurological:      General: No focal deficit present.      Mental Status: She is alert. She is disoriented.   Psychiatric:         Mood and Affect: Mood normal.              Significant Labs: All pertinent labs within the past 24 hours have been reviewed.  BMP:   Recent Labs   Lab 06/11/23  0346      *   K 3.8      CO2 23   BUN 33*   CREATININE 1.0   CALCIUM 8.7       CBC:   Recent Labs   Lab 06/10/23  0704 06/11/23  0346   WBC 6.86 6.78   HGB 7.9* 7.2*   HCT 24.5* 24.0*    232       CMP:   Recent Labs   Lab 06/10/23  0704 06/11/23  0346   * 135*   K 4.1 3.8    102   CO2 24  23   GLU 91 101   BUN 20 33*   CREATININE 1.0 1.0   CALCIUM 9.1 8.7   ANIONGAP 6* 10         Significant Imaging: I have reviewed all pertinent imaging results/findings within the past 24 hours.  I have reviewed and interpreted all pertinent imaging results/findings within the past 24 hours.

## 2023-06-11 NOTE — PROGRESS NOTES
[facility] Medicine  Progress Note    Patient Name: Alyl Steiner  MRN: [id number]  Patient Class: IP- Inpatient   Admission Date: 6/7/2023  Length of Stay: 4 days  Attending Physician: Viki Mcneill MD  Primary Care Provider: Josh Fajardo MD        Subjective:     Principal Problem:Periprosthetic fracture around internal prosthetic joint        HPI:  Patient is a pleasant 83 year old AA female with a past medical history of DM2, dementia, hyperlipidemia, and hx of knee replacements bilaterally.  She is very demented and history is obtained via both of her daughters who are present at bedside.  She presented to [facility] ED with inability to bear weight after a mechanical fall down the stairs.  Denies LOC, but patient has had episodes of sundowning prompting her to try to walk down the stairs.  Otherwise she usually is able to walk down the stairs without issue and is able to walk long distances without limitation at her baseline.  She denies any head trauma.        At the outside ED, vital signs were stable, labs unremarkable.  XR of the femur shows periprosthetic R sided femoral fracture, she was transferred here for orthopedic evaluation, who recommended urgent surgical repair of her femur.  Hospital medicine consulted for preoperative evaluation.      Overview/Hospital Course:  No notes on file    Interval history- she is awake and in a good mood. Family at bedside and she was jokign with examiner and her family during exam. BM this morning and reports no pain unless you ask her as she hsant even thought about it. She was kissing her stuffed animal at the bedside and in good spirits. Hg slight downtick with known anemia on admit to lower 7's from higher 7's and so will transfuse 1 U today to darin her up in preparation for discharge likely tmorrow if doing well. Has known SARAH and takes liquid iron at home and on iron while here as well. Plan to set up HH with DME with  family at home to help with adl's and care 4/7 per preferences and patient is happy about that also.         Review of patient's allergies indicates:  No Known Allergies    No current facility-administered medications on file prior to encounter.     Current Outpatient Medications on File Prior to Encounter   Medication Sig    ferrous sulfate 220 mg (44 mg iron)/5 mL solution Take 220 mg by mouth.    furosemide (LASIX) 20 MG tablet TAKE 1 TABLET BY MOUTH ONCE DAILY AS NEEDED FOR EDEMA    levETIRAcetam (KEPPRA) 250 MG Tab Take 1 tablet (250 mg total) by mouth nightly. (Patient not taking: Reported on 3/15/2023)    mirtazapine (REMERON) 15 MG tablet Take 15 mg by mouth every evening.    multivitamin capsule Take 1 capsule by mouth once daily.    potassium chloride SA (K-DUR,KLOR-CON M) 10 MEQ tablet Take 10 mEq by mouth daily as needed.    venlafaxine (EFFEXOR-XR) 37.5 MG 24 hr capsule Take 1 capsule (37.5 mg total) by mouth once daily. (Patient not taking: Reported on 3/15/2023)     Family History    None       Tobacco Use    Smoking status: Never    Smokeless tobacco: Never   Substance and Sexual Activity    Alcohol use: Never    Drug use: Never    Sexual activity: Not on file     Review of Systems   Reason unable to perform ROS: Patient unable to answer questions appropriately.   Objective:     Vital Signs (Most Recent):  Temp: 98.2 °F (36.8 °C) (06/11/23 0725)  Pulse: 72 (06/11/23 0725)  Resp: 18 (06/11/23 0725)  BP: (!) 144/67 (06/11/23 0725)  SpO2: 100 % (06/11/23 0725) Vital Signs (24h Range):  Temp:  [97.2 °F (36.2 °C)-99.7 °F (37.6 °C)] 98.2 °F (36.8 °C)  Pulse:  [72-84] 72  Resp:  [16-20] 18  SpO2:  [95 %-100 %] 100 %  BP: (121-187)/(58-88) 144/67     Weight: 49.8 kg (109 lb 12.6 oz)  Body mass index is 21.44 kg/m².     Physical Exam  Constitutional:       General: She is not in acute distress.     Appearance: Normal appearance. She is not ill-appearing.      Comments: Awake and in good spirits.  family at bedside.   HENT:      Head: Normocephalic and atraumatic.      Right Ear: Tympanic membrane normal.      Left Ear: Tympanic membrane normal.   Cardiovascular:      Rate and Rhythm: Normal rate and regular rhythm.      Pulses: Normal pulses.      Heart sounds: Normal heart sounds. No murmur heard.  Pulmonary:      Effort: No respiratory distress.      Breath sounds: No wheezing.   Abdominal:      General: Abdomen is flat. Bowel sounds are normal.      Palpations: Abdomen is soft.   Musculoskeletal:         General: No swelling.      Cervical back: Normal range of motion and neck supple.      Comments: R knee with bandages and ropivicaine   Skin:     General: Skin is warm and dry.      Capillary Refill: Capillary refill takes 2 to 3 seconds.   Neurological:      General: No focal deficit present.      Mental Status: She is alert. She is disoriented.   Psychiatric:         Mood and Affect: Mood normal.              Significant Labs: All pertinent labs within the past 24 hours have been reviewed.  BMP:   Recent Labs   Lab 06/11/23  0346      *   K 3.8      CO2 23   BUN 33*   CREATININE 1.0   CALCIUM 8.7       CBC:   Recent Labs   Lab 06/10/23  0704 06/11/23  0346   WBC 6.86 6.78   HGB 7.9* 7.2*   HCT 24.5* 24.0*    232       CMP:   Recent Labs   Lab 06/10/23  0704 06/11/23  0346   * 135*   K 4.1 3.8    102   CO2 24 23   GLU 91 101   BUN 20 33*   CREATININE 1.0 1.0   CALCIUM 9.1 8.7   ANIONGAP 6* 10         Significant Imaging: I have reviewed all pertinent imaging results/findings within the past 24 hours.  I have reviewed and interpreted all pertinent imaging results/findings within the past 24 hours.      Assessment/Plan:      * Periprosthetic fracture of the right distal femur  Sustained in fall and OR on 6/8 for retrograde nail with orthopedics  -PT/OT recs snf but family prefers  and has 24/ care for her at home. Will likely need hospital bed and will f/u PT recs  for this  -ropivicaine in place and multimodal management with pain controlled so far post op  NWB x 8 weeks with ROM to RLE, DVT ppx for 6 weeks per ortho, end date July 21st  -given 1 U of PRBCS for anemia on admit with hg 8.2 post op  -bandages to stay on until orthopedics follow up  -bowel regimen- family brought metamucil and plans to do her regular routinue on 6/10 to help her on commode to help promote BM       Acute blood loss anemia  expcted with fracture and inflammation on top of chronic anemia, given 1 U PRBCS prior to surgery given hg 7.7. and continue to closely monitor-->8.2 now--7.9--7.2 and will give 1 U to darin her up today prior to discharge. Known SARAH and on iron at home      Fever    Unclear cause as present on admit and on 6/7 overnight. UA negative on admit, CXR without signs of PNA. Procal negative on admit, wbc not elevated CRP elevated but not unexpected given fx so watch closely for reoccurence of fevers as resolved since prior to surgery  -no recent symptoms of infecition per family priro to admit  -if continues to persist post op may have to work up further including viral causes and consider LE US for any non infeciotu causes for persistant fever  None further as of 6/10    Iron deficiency anemia  Ferritin of 26 with anemia on admit with iron BID started for this.   -given age and comorbidities, likely would not benefit from extensive work up long term for this such as C scope      Anemia of chronic disease  Hg 9 baseline in the past, iron low and replacement started      Seizure disorder    Continue keppra    Stage 3a chronic kidney disease  Cr at [facility] on admit      Essential hypertension  On no meds at home to treat.       Late onset Alzheimer's disease with behavioral disturbance  Baseline dementia and has family assistance at home. Family at bedside during exam 6/8  Pleasant on exam, alert with mild confusion at times with redirection per family        VTE Risk Mitigation (From  admission, onward)         Ordered     apixaban tablet 2.5 mg  2 times daily         06/08/23 1038     Place sequential compression device  Until discontinued         06/07/23 0239     IP VTE HIGH RISK PATIENT  Once         06/07/23 0239                Discharge Planning   EDDIE: 6/12/2023     Code Status: Full Code   Is the patient medically ready for discharge?: No    Reason for patient still in hospital (select all that apply): Patient trending condition  Discharge Plan A: Home with family, Home Health                  Viik Mcneill MD  Department of Hospital Medicine   [facility]

## 2023-06-11 NOTE — ANESTHESIA POST-OP PAIN MANAGEMENT
Acute Pain Service Progress Note    Ally Steiner is a 83 y.o., female, [id number].    Surgery:  INSERTION, INTRAMEDULLARY FARHAT, FEMUR, DISTAL, RETROGRADE - right. trios, synthes, c arm door side (Right: Leg Upper)    Post Op Day #: 3    Catheter type: perineural SIFI    Infusion type: Ropivacaine 0.2% 0.1 mL/hr + 15mL q3hr    Problem List:    Active Hospital Problems    Diagnosis  POA    *Periprosthetic fracture of the right distal femur [M97.9XXA]  Yes    Iron deficiency anemia [D50.9]  Yes    Fever [R50.9]  Yes    Acute blood loss anemia [D62]  Yes    Anemia of chronic disease [D63.8]  Yes    Closed fracture of right distal femur [S72.401A]  Yes    Seizure disorder [G40.909]  Yes    Stage 3a chronic kidney disease [N18.31]  Yes     Reviewed labs, plan for more labs next visit.       Essential hypertension [I10]  Yes     Diet controlled. bp well controlled today.       Late onset Alzheimer's disease with behavioral disturbance [G30.1, F02.818]  Yes     Established with neuropsych. Due for follow up ~10/2021. Family helps with ADLs. Does not drive        Resolved Hospital Problems    Diagnosis Date Resolved POA    Preop cardiovascular exam [Z01.810] 06/08/2023 Not Applicable       Subjective:     General appearance of alert, oriented, no complaints   Pain with rest: 1    Numbers   Pain with movement: 4    Numbers   Side Effects    1. Pruritis No    2. Nausea No    3. Motor Blockade No, 0=Ability to raise lower extremities off bed    4. Sedation No, 1=awake and alert    Objective:        Catheter site clean, dry, intact        Vitals   Vitals:    06/11/23 1324   BP: 136/61   Pulse: 71   Resp: 16   Temp: 36.6 °C (97.9 °F)        Labs    No results displayed because visit has over 200 results.           Meds   Current Facility-Administered Medications   Medication Dose Route Frequency Provider Last Rate Last Admin    0.9%  NaCl infusion (for blood administration)   Intravenous Q24H PRN Cristiane PACKER  MD Jasbir        0.9%  NaCl infusion (for blood administration)   Intravenous Q24H PRN Viki Mcneill MD        acetaminophen tablet 1,000 mg  1,000 mg Oral Q8H Sergio Mao MD   1,000 mg at 06/11/23 1302    apixaban tablet 2.5 mg  2.5 mg Oral BID Cristiane Martell MD   2.5 mg at 06/11/23 0926    dextrose 10% bolus 125 mL 125 mL  12.5 g Intravenous PRN Carlos Bess MD        dextrose 10% bolus 250 mL 250 mL  25 g Intravenous PRN Carlos Bess MD        dextrose 40 % gel 15,000 mg  15 g Oral PRN Carlos Bess MD        dextrose 40 % gel 30,000 mg  30 g Oral PRN Carlos Bess MD        diphenhydrAMINE capsule 25 mg  25 mg Oral Nightly PRN Viki Mcneill MD   25 mg at 06/10/23 2124    ferrous sulfate tablet 1 each  1 tablet Oral BID Viki Mcneill MD   1 each at 06/11/23 0926    glucagon (human recombinant) injection 1 mg  1 mg Intramuscular PRN Carlos Bess MD        levETIRAcetam tablet 250 mg  250 mg Oral Nightly Carlos Bess MD   250 mg at 06/10/23 2123    melatonin tablet 6 mg  6 mg Oral Nightly PRN Noemí Bravo NP   6 mg at 06/10/23 2122    methocarbamoL tablet 500 mg  500 mg Oral Q6H PRN Sergio Mao MD   500 mg at 06/11/23 0311    mirtazapine tablet 15 mg  15 mg Oral QHS Carlos Bess MD   15 mg at 06/09/23 2121    multivitamin tablet  1 tablet Oral Daily Carlos Bess MD   1 tablet at 06/11/23 0926    mupirocin 2 % ointment   Nasal BID Maria Guadalupe Fang MD   Given at 06/11/23 0926    naloxone 0.4 mg/mL injection 0.02 mg  0.02 mg Intravenous PRN Carlos Bess MD        ondansetron injection 4 mg  4 mg Intravenous Q8H PRN Carlos Bess MD   4 mg at 06/08/23 0949    ROPIvacaine (PF) 2 mg/ml (0.2%) solution  0.1 mL/hr Perineural Continuous Moira Kolb MD 0.1 mL/hr at 06/11/23 0521 0.1 mL/hr at 06/11/23 0521    senna-docusate 8.6-50 mg per tablet 1 tablet  1 tablet Oral Daily PRN Nessa Sanchez MD   1 tablet at 06/10/23 0556    sodium chloride 0.9% flush 10 mL  10 mL  Intravenous Q12H PRN Carlos Bess MD        vitamin D 1000 units tablet 1,000 Units  1,000 Units Oral Daily Maria Guadalupe Fang MD   1,000 Units at 06/11/23 0926        Anticoagulant dose Eliquis 2.5 mg BID    Assessment:     Pain control adequate    Plan:            Patient doing well, continue present treatment.     1) Continue PNC, plan to pause and pull 6/12/23 assuming  2) Continue tylenol 1g q8hr  3) Continue Robaxin 500 mg q6hr PRN     We will follow up with patient.    Case discussed with staff, Dr. Perea; final recommendations per attestation above.      Thank you for your consult and allowing us to participate in the care of this patient. We will continue to follow along. Please call the Acute Pain Service/Anesthesia if you have any questions or concerns.     Casimiro Gonzalez MD  Anesthesiology Resident CA1/PGY2  [facility]

## 2023-06-11 NOTE — PT/OT/SLP PROGRESS
"Physical Therapy Treatment  -cotx with OT    Patient Name:  Ally Steiner   MRN:  [id number]    Recommendations:     Discharge Recommendations: nursing facility, skilled  Discharge Equipment Recommendations:  (TBD)  Barriers to discharge: Inaccessible home and impaired functional mobility requiring increased assistance    Assessment:     Ally Steiner is a 83 y.o. female admitted with a medical diagnosis of Periprosthetic fracture around internal prosthetic joint.  She presents with the following impairments/functional limitations: weakness, impaired endurance, impaired self care skills, impaired functional mobility, gait instability, impaired balance, orthopedic precautions, decreased safety awareness, impaired cognition, decreased lower extremity function, decreased ROM.    Rehab Prognosis: Good; patient would benefit from acute skilled PT services to address these deficits and reach maximum level of function.    Recent Surgery: Procedure(s) (LRB):  INSERTION, INTRAMEDULLARY FARHAT, FEMUR, DISTAL, RETROGRADE - right. trios, synthes, c arm door side (Right) 3 Days Post-Op    Plan:     During this hospitalization, patient to be seen 4 x/week to address the identified rehab impairments via gait training, therapeutic activities, therapeutic exercises, neuromuscular re-education, wheelchair management/training and progress toward the following goals:    Plan of Care Expires:  07/09/23    Subjective     Chief Complaint: pain  Patient/Family Comments/goals: pts daughter reports she has been transferring pt to/from bedside commode without difficutly  Pain/Comfort:  Pain Rating 1: other (see comments) (unrated c/o generalized pain "all over" "everything hurts")  Location - Orientation 1: generalized  Pain Addressed 1: Reposition, Distraction, Pre-medicate for activity  Pain Rating Post-Intervention 1: 0/10      Objective:     Communicated with RN prior to session.  Patient found HOB elevated with SCD, FCD, " peripheral IV, perineural catheter, PureWick upon PTA entry to room.     General Precautions: Standard, fall  Orthopedic Precautions: RLE non weight bearing  Braces: N/A  Respiratory Status: Room air     Functional Mobility:  Bed Mobility:     Scooting: moderate assistance  Supine to Sit: moderate assistance  Sit to Supine: moderate assistance  Transfers:     Sit to Stand:  minimum assistance, moderate assistance, and of 2 persons with bilateral hand-held assist  Gait: unable d/t assistance needed to maintain NWB to RLE      AM-PAC 6 CLICK MOBILITY  Turning over in bed (including adjusting bedclothes, sheets and blankets)?: 3  Sitting down on and standing up from a chair with arms (e.g., wheelchair, bedside commode, etc.): 2  Moving from lying on back to sitting on the side of the bed?: 3  Moving to and from a bed to a chair (including a wheelchair)?: 2  Need to walk in hospital room?: 2  Climbing 3-5 steps with a railing?: 1  Basic Mobility Total Score: 13       Treatment & Education:  Pt sits EOB ~20 minutes with SBA, intermittent cues to avoid WB into RLE to reposition/scoot at EOB.   2 standing trials for ~2+ minutes with CGA to Min A for writing therapist to assist pt with maintaining NWB to RLE.     Patient left HOB elevated with all lines intact, call button in reach, bed alarm on, and daughter present..    GOALS:   Multidisciplinary Problems       Physical Therapy Goals          Problem: Physical Therapy    Goal Priority Disciplines Outcome Goal Variances Interventions   Physical Therapy Goal     PT, PT/OT Ongoing, Progressing     Description: Goals to be met by: 23     Patient will increase functional independence with mobility by performin. Supine to sit with Stand-by Assistance  2. Sit to supine with Stand-by Assistance  3. Sit to stand transfer with contact guard Assistance within orthopedic precautions for RLE.   4. Gait  x 10 feet with Minimal Assistance using LRAD within orthopedic  precautions for RLE   5. Ascend/descend 4 stairs with Handrails as needed with minimal assistance within orthopedic precautions for RLE   6. Lower extremity exercise program x20 reps per handout, with assistance as needed   7. Pt will complete bed<>wheelchair transfer with contact guard assistance                          Time Tracking:     PT Received On: 06/11/23  PT Start Time: 1419     PT Stop Time: 1447  PT Total Time (min): 28 min     Billable Minutes: Therapeutic Activity 24    Treatment Type: Treatment  PT/PTA: PTA     Number of PTA visits since last PT visit: 1 06/11/2023

## 2023-06-11 NOTE — PT/OT/SLP PROGRESS
"Occupational Therapy   Co-Treatment  Co-treatment with PT for maximal pt participation, safety, and activity tolerance     Name: Ally Steiner  MRN: [id number]  Admitting Diagnosis:  Periprosthetic fracture around internal prosthetic joint  4 Days Post-Op    Recommendations:     Discharge Recommendations: nursing facility, skilled  Discharge Equipment Recommendations:  hospital bed  Barriers to discharge:  Other (Comment)    Assessment:     Ally Steiner is a 83 y.o. female with a medical diagnosis of Periprosthetic fracture around internal prosthetic joint.  She presents with c/o pain all over, throughout her body. However she completed 2 sit to stands ~  2+ mins each and precautions were maintained. Performance deficits affecting function are impaired endurance, weakness, impaired sensation, impaired self care skills, impaired functional mobility, decreased safety awareness, gait instability, orthopedic precautions.     Rehab Prognosis:  Good; patient would benefit from acute skilled OT services to address these deficits and reach maximum level of function.       Plan:     Patient to be seen 3 x/week to address the above listed problems via therapeutic activities, neuromuscular re-education, self-care/home management, therapeutic exercises  Plan of Care Expires: 07/09/23  Plan of Care Reviewed with: patient    Subjective     Chief Complaint: pain   Patient/Family Comments/goals: Per report from daughter she has not had any difficulty transferring bedside commode  Pain/Comfort:  other (did not rate reports pain all over"  Location - Orientation 1: generalized  Pain Addressed 1: Reposition, Distraction, Pre-medicate for activity  Pain Rating Post-Intervention 1: 0/10  Objective:     Communicated with: RN prior to session.  Patient found HOB elevated with SCD, FCD, perineural catheter, peripheral IV, PureWick upon OT entry to room.    General Precautions: Standard, fall    Orthopedic Precautions:RLE " non weight bearing  Braces: N/A  Respiratory Status: Room air     Occupational Performance:     Bed Mobility:    Patient completed Scooting/Bridging with moderate assistance  Patient completed Supine to Sit with moderate assistance  Patient completed Sit to Supine with moderate assistance     Functional Mobility/Transfers:  Patient completed Sit <> Stand Transfer with minimum assistance of 2 persons  with  hand-held assist  1st trial   Patient completed Sit <> Stand Transfer with moderate assistance of 2 persons  with  hand-held assist 2nd trial  Functional Mobility: unable d/t assistance needed to maintain NWB to RLE    Activities of Daily Living:  Politely decline all ADLs  Completed prior session   [facility] 6 Click ADL: 14    Treatment & Education:  Per report of daughter she reports no problems with transfers on and off the [facility]  Sit stands 2 trials at EOB to focus on standing endurance in preparation for ADLs (verbal cueing required to maintain WB precaution)  Each sit stand was ~2+ mins   Pt. Sat at EOB ~20 mins with SBA (cueing was required to maintain weightbearing precautions)  Pt educated on role of occupational therapy, POC, and safety during ADLs and functional mobility. Pt and OT discussed importance of safe, continued mobility to optimize daily living skills. Pt verbalized understanding. Pt given instruction to call for medical staff/nurse for assistance.     Patient left HOB elevated with all lines intact, call button in reach, and Daughter present    GOALS:   Multidisciplinary Problems       Occupational Therapy Goals          Problem: Occupational Therapy    Goal Priority Disciplines Outcome Interventions   Occupational Therapy Goal     OT, PT/OT Ongoing, Progressing    Description: Goals to be met by: 6/19/23     Patient will increase functional independence with ADLs by performing:    UE Dressing with Moderate Assistance.  Grooming while EOB with Minimal Assistance.  Toileting from bedside commode  with Minimal Assistance for hygiene and clothing management.   Stand pivot transfers with Minimal Assistance.  Squat pivot transfers with Minimal Assistance.  Step transfer with Minimal Assistance                         Time Tracking:     OT Date of Treatment: 06/11/23  OT Start Time: 1419  OT Stop Time: 1447  OT Total Time (min): 28 min    Billable Minutes:Therapeutic Exercise 24    OT/CARY: OT          6/12/2023

## 2023-06-12 VITALS
WEIGHT: 109.81 LBS | RESPIRATION RATE: 18 BRPM | SYSTOLIC BLOOD PRESSURE: 121 MMHG | HEIGHT: 60 IN | BODY MASS INDEX: 21.56 KG/M2 | TEMPERATURE: 98 F | OXYGEN SATURATION: 97 % | HEART RATE: 78 BPM | DIASTOLIC BLOOD PRESSURE: 60 MMHG

## 2023-06-12 LAB
ANION GAP SERPL CALC-SCNC: 10 MMOL/L (ref 8–16)
BASOPHILS # BLD AUTO: 0.04 K/UL (ref 0–0.2)
BASOPHILS NFR BLD: 0.6 % (ref 0–1.9)
BUN SERPL-MCNC: 28 MG/DL (ref 8–23)
CALCIUM SERPL-MCNC: 8.9 MG/DL (ref 8.7–10.5)
CHLORIDE SERPL-SCNC: 104 MMOL/L (ref 95–110)
CO2 SERPL-SCNC: 23 MMOL/L (ref 23–29)
CREAT SERPL-MCNC: 1.1 MG/DL (ref 0.5–1.4)
DIFFERENTIAL METHOD: ABNORMAL
EOSINOPHIL # BLD AUTO: 0.2 K/UL (ref 0–0.5)
EOSINOPHIL NFR BLD: 3.5 % (ref 0–8)
ERYTHROCYTE [DISTWIDTH] IN BLOOD BY AUTOMATED COUNT: 16.2 % (ref 11.5–14.5)
EST. GFR  (NO RACE VARIABLE): 50.2 ML/MIN/1.73 M^2
GLUCOSE SERPL-MCNC: 100 MG/DL (ref 70–110)
HCT VFR BLD AUTO: 29.5 % (ref 37–48.5)
HGB BLD-MCNC: 9.1 G/DL (ref 12–16)
IMM GRANULOCYTES # BLD AUTO: 0.03 K/UL (ref 0–0.04)
IMM GRANULOCYTES NFR BLD AUTO: 0.5 % (ref 0–0.5)
LYMPHOCYTES # BLD AUTO: 1.4 K/UL (ref 1–4.8)
LYMPHOCYTES NFR BLD: 20.6 % (ref 18–48)
MCH RBC QN AUTO: 27 PG (ref 27–31)
MCHC RBC AUTO-ENTMCNC: 30.8 G/DL (ref 32–36)
MCV RBC AUTO: 88 FL (ref 82–98)
MONOCYTES # BLD AUTO: 0.6 K/UL (ref 0.3–1)
MONOCYTES NFR BLD: 8.8 % (ref 4–15)
NEUTROPHILS # BLD AUTO: 4.4 K/UL (ref 1.8–7.7)
NEUTROPHILS NFR BLD: 66 % (ref 38–73)
NRBC BLD-RTO: 0 /100 WBC
PLATELET # BLD AUTO: 262 K/UL (ref 150–450)
PMV BLD AUTO: 10.9 FL (ref 9.2–12.9)
POTASSIUM SERPL-SCNC: 3.6 MMOL/L (ref 3.5–5.1)
RBC # BLD AUTO: 3.37 M/UL (ref 4–5.4)
SODIUM SERPL-SCNC: 137 MMOL/L (ref 136–145)
WBC # BLD AUTO: 6.61 K/UL (ref 3.9–12.7)

## 2023-06-12 PROCEDURE — 25000003 PHARM REV CODE 250: Performed by: STUDENT IN AN ORGANIZED HEALTH CARE EDUCATION/TRAINING PROGRAM

## 2023-06-12 PROCEDURE — 25000003 PHARM REV CODE 250: Performed by: NURSE PRACTITIONER

## 2023-06-12 PROCEDURE — 25000003 PHARM REV CODE 250: Performed by: HOSPITALIST

## 2023-06-12 PROCEDURE — 80048 BASIC METABOLIC PNL TOTAL CA: CPT | Performed by: HOSPITALIST

## 2023-06-12 PROCEDURE — 99231 SBSQ HOSP IP/OBS SF/LOW 25: CPT | Mod: ,,, | Performed by: ANESTHESIOLOGY

## 2023-06-12 PROCEDURE — 85025 COMPLETE CBC W/AUTO DIFF WBC: CPT | Performed by: HOSPITALIST

## 2023-06-12 PROCEDURE — 25000003 PHARM REV CODE 250: Performed by: INTERNAL MEDICINE

## 2023-06-12 PROCEDURE — 99239 PR HOSPITAL DISCHARGE DAY,>30 MIN: ICD-10-PCS | Mod: ,,, | Performed by: HOSPITALIST

## 2023-06-12 PROCEDURE — 94761 N-INVAS EAR/PLS OXIMETRY MLT: CPT

## 2023-06-12 PROCEDURE — 99231 PR SUBSEQUENT HOSPITAL CARE,LEVL I: ICD-10-PCS | Mod: ,,, | Performed by: ANESTHESIOLOGY

## 2023-06-12 PROCEDURE — 36415 COLL VENOUS BLD VENIPUNCTURE: CPT | Performed by: HOSPITALIST

## 2023-06-12 PROCEDURE — 99239 HOSP IP/OBS DSCHRG MGMT >30: CPT | Mod: ,,, | Performed by: HOSPITALIST

## 2023-06-12 RX ORDER — DIVALPROEX SODIUM 250 MG/1
250 TABLET, DELAYED RELEASE ORAL ONCE
Status: COMPLETED | OUTPATIENT
Start: 2023-06-12 | End: 2023-06-12

## 2023-06-12 RX ADMIN — METHOCARBAMOL 500 MG: 500 TABLET ORAL at 03:06

## 2023-06-12 RX ADMIN — CHOLECALCIFEROL TAB 25 MCG (1000 UNIT) 1000 UNITS: 25 TAB at 09:06

## 2023-06-12 RX ADMIN — THERA TABS 1 TABLET: TAB at 09:06

## 2023-06-12 RX ADMIN — DIVALPROEX SODIUM 250 MG: 250 TABLET, DELAYED RELEASE ORAL at 12:06

## 2023-06-12 RX ADMIN — APIXABAN 2.5 MG: 2.5 TABLET, FILM COATED ORAL at 09:06

## 2023-06-12 RX ADMIN — FERROUS SULFATE TAB 325 MG (65 MG ELEMENTAL FE) 1 EACH: 325 (65 FE) TAB at 09:06

## 2023-06-12 NOTE — NURSING
Pt family member called nurse to bedside to redirect nurse prn meds did not help pt sleep stated she is more agitated and trying to get out of bed contacted provider   for additional  prn and administered

## 2023-06-12 NOTE — PLAN OF CARE
Problem: Infection  Goal: Absence of Infection Signs and Symptoms  Outcome: Ongoing, Progressing     Problem: Adult Inpatient Plan of Care  Goal: Plan of Care Review  Outcome: Ongoing, Progressing  Goal: Patient-Specific Goal (Individualized)  Outcome: Ongoing, Progressing  Goal: Absence of Hospital-Acquired Illness or Injury  Outcome: Ongoing, Progressing  Goal: Optimal Comfort and Wellbeing  Outcome: Ongoing, Progressing     Problem: Adult Inpatient Plan of Care  Goal: Plan of Care Review  Outcome: Ongoing, Progressing     Problem: Adult Inpatient Plan of Care  Goal: Patient-Specific Goal (Individualized)  Outcome: Ongoing, Progressing     Problem: Adult Inpatient Plan of Care  Goal: Absence of Hospital-Acquired Illness or Injury  Outcome: Ongoing, Progressing     Problem: Adult Inpatient Plan of Care  Goal: Optimal Comfort and Wellbeing  Outcome: Ongoing, Progressing     Problem: Skin Injury Risk Increased  Goal: Skin Health and Integrity  Outcome: Ongoing, Progressing    PRN  somewhat effective for sleep. Pt needs redirection throughout the night family member remained at bedside  Explained plan of care with family member. Educated pt .on new medicine . No injury during shift, Side rails up x 2, call light by bedside.

## 2023-06-12 NOTE — PLAN OF CARE
CHW met with patient/family at bedside. Patient experience rounding completed and reviewed the following.     Do you know your discharge plan? Yes       If yes, what is the plan? Home, Home Health    If you are discharging home, do you have help at home? Yes    Do you think you will need help at home at discharge?  No     Have you discussed your needs and preferences with your SW/CM? Yes     Assigned SW/CM notified of any patient/family needs or concerns.

## 2023-06-12 NOTE — TREATMENT PLAN
PNC catheter paused at 7:30 AM. Will continue to assess pain with plans to pull catheter if pain controlled.     Elizabeth Hubbard MD  PGY3, CA2 Anesthesiology

## 2023-06-12 NOTE — DISCHARGE SUMMARY
DISCHARGE SUMMARY  Hospital Medicine    [place] [facility] [place] [facility] [place]    Patient Name: Ally Steiner  YOB: 1940    Admit Date: 6/7/2023    Discharge Date: 06/12/2023    Discharge Attending Physician: Viki Mcneill MD     Principal Diagnoses:  Active Hospital Problems    Diagnosis  POA    *Periprosthetic fracture of the right distal femur [M97.9XXA]  Yes    Iron deficiency anemia [D50.9]  Yes    Fever [R50.9]  Yes    Acute blood loss anemia [D62]  Yes    Anemia of chronic disease [D63.8]  Yes    Closed fracture of right distal femur [S72.401A]  Yes    Seizure disorder [G40.909]  Yes    Stage 3a chronic kidney disease [N18.31]  Yes     Reviewed labs, plan for more labs next visit.       Essential hypertension [I10]  Yes     Diet controlled. bp well controlled today.       Late onset Alzheimer's disease with behavioral disturbance [G30.1, F02.818]  Yes     Established with neuropsych. Due for follow up ~10/2021. Family helps with ADLs. Does not drive        Resolved Hospital Problems    Diagnosis Date Resolved POA    Preop cardiovascular exam [Z01.810] 06/08/2023 Not Applicable       Discharged Condition:  improved    Interval history- sleeping this moring as was up later during night and family at bedside now reports that she settled down and likely will sleep this morning given was off her sleep cycles last night. Plan for dc today with HH and DME.    Temp:  [96.7 °F (35.9 °C)-98.4 °F (36.9 °C)]   Pulse:  [71-81]   Resp:  [15-18]   BP: (121-185)/(59-86)   SpO2:  [95 %-100 %]    Physical Exam  Constitutional:       General: She is not in acute distress.     Appearance: Normal appearance. She is not ill-appearing.      Comments: sleeping this morning family at bedside.   HENT:      Head: Normocephalic and atraumatic.      Right Ear: Tympanic membrane normal.      Left Ear: Tympanic membrane normal.   Cardiovascular:      Rate and Rhythm: Normal rate and regular rhythm.      Pulses: Normal pulses.       Heart sounds: Normal heart sounds. No murmur heard.  Pulmonary:      Effort: No respiratory distress.      Breath sounds: No wheezing.   Abdominal:      General: Abdomen is flat. Bowel sounds are normal.      Palpations: Abdomen is soft.   Musculoskeletal:         General: No swelling.      Cervical back: Normal range of motion and neck supple.      Comments: R knee with bandages and ropivicaine   Skin:     General: Skin is warm and dry.      Capillary Refill: Capillary refill takes 2 to 3 seconds.   Neurological:      General: No focal deficit present.      Mental Status: She is alert. She is disoriented.   Psychiatric:         Mood and Affect: Mood normal.          HOSPITAL COURSE:      Initial Presentation:    Patient is a pleasant 83 year old AA female with a past medical history of DM2, dementia, hyperlipidemia, and hx of knee replacements bilaterally.  She is very demented and history is obtained via both of her daughters who are present at bedside.  She presented to [facility] ED with inability to bear weight after a mechanical fall down the stairs.  Denies LOC, but patient has had episodes of sundowning prompting her to try to walk down the stairs.  Otherwise she usually is able to walk down the stairs without issue and is able to walk long distances without limitation at her baseline.  She denies any head trauma.          At the outside ED, vital signs were stable, labs unremarkable.  XR of the femur shows periprosthetic R sided femoral fracture, she was transferred here for orthopedic evaluation, who recommended urgent surgical repair of her femur.  Hospital medicine consulted for preoperative evaluation.       Course of Principle Problem for Admission:    Periprosthetic fracture of the right distal femur  Sustained in fall and OR on 6/8 for retrograde nail with orthopedics  -PT/OT recs snf but family prefers  and has 24/ care for her at home. Plan for hospital bed for home for [facility] and HH set up  for her.  -multimodal management with pain controlled so far post op and on discharge  NWB x 8 weeks with ROM to RLE, DVT ppx for 6 weeks per ortho, end date July 21st  -given 1 U of PRBCS for anemia on admit with hg 8.2 post op and 1 further on 6/11 with improvement of hg to 9's  -bandages to stay on until orthopedics follow up  -bowel regimen- family brought metamucil and plans to do her regular routinue on 6/10 to help her on commode to help promote BM and successful with this prior to discharge.        Acute blood loss anemia  expcted with fracture and inflammation on top of chronic anemia, given 1 U PRBCS prior to surgery given hg 7.7. and continue to closely monitor-->8.2 now--7.9--7.2 and will give 1 U to darin her up today prior to discharge with improvements to 9's   Known SARAH and on iron at home        Fever     Unclear cause as present on admit and on 6/7 overnight. UA negative on admit, CXR without signs of PNA. Procal negative on admit, wbc not elevated CRP elevated but not unexpected given fx so watch closely for reoccurence of fevers as resolved since prior to surgery  -no recent symptoms of infecition per family priro to admit  -if continues to persist post op may have to work up further including viral causes and consider LE US for any non infeciotu causes for persistant fever  None further as of 6/10 and through rest of stay     Iron deficiency anemia  Ferritin of 26 with anemia on admit with iron BID started for this and takes liquid iron at home per family but change to twice a day now  -given age and comorbidities, likely would not benefit from extensive work up long term for this such as C scope        Anemia of chronic disease  Hg 9 baseline in the past, iron low and replacement started        Seizure disorder     Continue keppra     Stage 3a chronic kidney disease  Cr at [facility] on admit        Essential hypertension  On no meds at home to treat.         Late onset Alzheimer's disease with  behavioral disturbance  Baseline dementia and has family assistance at home. Family at bedside during exam 6/8  Pleasant on exam, alert with mild confusion at times with redirection per family       Consults: ortho    Last CBC/BMP:    CBC/Anemia Labs: Coags:    Recent Labs   Lab 06/07/23 0445 06/08/23  0528 06/10/23  0704 06/11/23  0346 06/12/23  0647   WBC 8.58   < > 6.86 6.78 6.61   HGB 7.7*   < > 7.9* 7.2* 9.1*   HCT 24.8*   < > 24.5* 24.0* 29.5*      < > 207 232 262   MCV 82   < > 85 88 88   RDW 15.1*   < > 16.2* 16.7* 16.2*   IRON 37  --   --   --   --    FERRITIN 26  --   --   --   --     < > = values in this interval not displayed.    Recent Labs   Lab 06/06/23  2254   INR 1.0        Chemistries:   Recent Labs   Lab 06/07/23 0445 06/08/23 0528 06/09/23  0351 06/10/23  0704 06/11/23  0346 06/12/23  0647    134* 133* 135* 135* 137   K 4.6 4.1 4.1 4.1 3.8 3.6    101 101 105 102 104   CO2 25 24 23 24 23 23   BUN 16 16 15 20 33* 28*   CREATININE 1.4 1.1 1.3 1.0 1.0 1.1   CALCIUM 9.2 9.1 9.1 9.1 8.7 8.9   PROT 7.2 7.2 7.1  --   --   --    BILITOT 0.5 0.9 0.4  --   --   --    ALKPHOS 99 102 89  --   --   --    ALT 8* 8* 8*  --   --   --    AST 13 15 24  --   --   --    MG 1.6 1.9 1.6  --   --   --    PHOS 3.2 2.6* 2.6*  --   --   --             Special Treatments/Procedures:   Procedure(s) (LRB):  INSERTION, INTRAMEDULLARY FARHAT, FEMUR, DISTAL, RETROGRADE - right. trios, synthes, c arm door side (Right)     Disposition: Home-Health Care [facility]      Future Scheduled Appointments:  Future Appointments   Date Time Provider Department Center   6/22/2023 10:30 AM Servando Barragan NP [facility] ORTHO [address] [place]   7/20/2023  8:45 AM Sharon Lee PA-C [facility] [address] [place]   9/20/2023 10:45 AM DO [facility]       Discharge Medication List:         Medication List        START taking these medications      acetaminophen 500 MG tablet  Commonly known as: TYLENOL  Take 2 tablets  (1,000 mg total) by mouth every 8 (eight) hours.     ELIQUIS 2.5 mg Tab  Generic drug: apixaban  Take 1 tablet (2.5 mg total) by mouth 2 (two) times daily.     levETIRAcetam 250 MG Tab  Commonly known as: KEPPRA  Take 1 tablet (250 mg total) by mouth nightly.     methocarbamoL 500 MG Tab  Commonly known as: ROBAXIN  Take 1 tablet (500 mg total) by mouth every 6 (six) hours as needed (pain scale 4-7).     vitamin D 1000 units Tab  Commonly known as: VITAMIN D3  Take 1 tablet (1,000 Units total) by mouth once daily.            CHANGE how you take these medications      ferrous sulfate 220 mg (44 mg iron)/5 mL solution  Take 5 mLs (220 mg total) by mouth 2 (two) times daily.  What changed: when to take this            CONTINUE taking these medications      furosemide 20 MG tablet  Commonly known as: LASIX     mirtazapine 15 MG tablet  Commonly known as: REMERON     multivitamin capsule     potassium chloride SA 10 MEQ tablet  Commonly known as: K-DUR,KLOR-CON M            STOP taking these medications      venlafaxine 37.5 MG 24 hr capsule  Commonly known as: EFFEXOR-XR               Where to Get Your Medications        These medications were sent to [facility] [address], [place] [address]      Hours: Mon-Fri 7a-7p, Sat-Sun 10a-4p Phone: [phone]   ELIQUIS 2.5 mg Tab  methocarbamoL 500 MG Tab  vitamin D 1000 units Tab       Information about where to get these medications is not yet available    Ask your nurse or doctor about these medications  acetaminophen 500 MG tablet  ferrous sulfate 220 mg (44 mg iron)/5 mL solution         Patient Instructions:  Discharge Procedure Orders   HOSPITAL BED FOR HOME USE     Order Specific Question Answer Comments   Type: Semi-electric    Length of need (1-99 months): 99    Does patient have medical equipment at home? walker, rolling    Does patient have medical equipment at home? rollator    Does patient have medical equipment at home? wheelchair     Does patient have medical equipment at home? cane, straight    Does patient have medical equipment at home? shower chair    Height: 5' (1.524 m)    Weight: 49.8 kg (109 lb 12.6 oz)    Please check all that apply: Patient requires positioning of the body in ways not feasible in an ordinary bed due to a medical condition which is expected to last at least one month.    Please check all that apply: Patient requires, for the alleviation of pain, positioning of the body in ways not feasible in an ordinary bed.      HOSPITAL BED FOR HOME USE     Order Specific Question Answer Comments   Type: Semi-electric    Length of need (1-99 months): 34    Does patient have medical equipment at home? walker, rolling    Does patient have medical equipment at home? rollator    Does patient have medical equipment at home? wheelchair    Does patient have medical equipment at home? cane, straight    Does patient have medical equipment at home? shower chair    Height: 5' (1.524 m)    Weight: 49.8 kg (109 lb 12.6 oz)    Please check all that apply: Patient requires positioning of the body in ways not feasible in an ordinary bed due to a medical condition which is expected to last at least one month.    Please check all that apply: Patient requires, for the alleviation of pain, positioning of the body in ways not feasible in an ordinary bed.    Please check all that apply: Patient requires frequent changes in body position and/or has an immediate need for a change in body position.      Diet Adult Regular     Notify your health care provider if you experience any of the following:  severe uncontrolled pain     Notify your health care provider if you experience any of the following:  increased confusion or weakness     Weight bearing restrictions (specify):   Order Comments: Toe touch weight bearing       At the time of discharge patient was told to take all medications as prescribed, to keep all followup appointments, and to call their  primary care physician or return to the emergency room if they have any worsening or concerning symptoms.    I spent 35 minutes preparing the patient for discharge.    Signing Physician:  Viki Mcneill MD

## 2023-06-12 NOTE — PLAN OF CARE
06/12/23 0841   Post-Acute Status   Post-Acute Authorization Home Health   Home Health Status Set-up Complete/Auth obtained   Discharge Plan   Discharge Plan A Home Health;Home with family   Discharge Plan B Skilled Nursing Facility     Patient Accepted with [facility],  order sent via [facility]. Hospital bed ordered.     Plan B: pt also accepted to [facility] and [facility] if pt's family choice change.     Hospital Bed approved, expected to be delivered on 06/13.     Maribel Valenzuela LMSW  Case Management   [facility]   Ext. [phone]

## 2023-06-12 NOTE — NURSING
Family member called stated pt tried to leave bed and this nurse redirected patient and provided washcloths to fold

## 2023-06-12 NOTE — ANESTHESIA POST-OP PAIN MANAGEMENT
Acute Pain Service Progress Note    Ally Steiner is a 83 y.o., female, [id number].    Surgery:  INSERTION, INTRAMEDULLARY FARHAT, FEMUR, DISTAL, RETROGRADE - right. trios, synthes, c arm door side (Right: Leg Upper)    Post Op Day #: 4    Catheter type: perineural SIFI    Infusion type: Ropivacaine 0.2% 0.1 mL/hr + 15mL q3hr    Problem List:    Active Hospital Problems    Diagnosis  POA    *Periprosthetic fracture of the right distal femur [M97.9XXA]  Yes    Iron deficiency anemia [D50.9]  Yes    Fever [R50.9]  Yes    Acute blood loss anemia [D62]  Yes    Anemia of chronic disease [D63.8]  Yes    Closed fracture of right distal femur [S72.401A]  Yes    Seizure disorder [G40.909]  Yes    Stage 3a chronic kidney disease [N18.31]  Yes     Reviewed labs, plan for more labs next visit.       Essential hypertension [I10]  Yes     Diet controlled. bp well controlled today.       Late onset Alzheimer's disease with behavioral disturbance [G30.1, F02.818]  Yes     Established with neuropsych. Due for follow up ~10/2021. Family helps with ADLs. Does not drive        Resolved Hospital Problems    Diagnosis Date Resolved POA    Preop cardiovascular exam [Z01.810] 06/08/2023 Not Applicable       Subjective:     General appearance of alert, oriented, no complaints   Pain with rest: 1    Numbers   Pain with movement: 4    Numbers   Side Effects    1. Pruritis No    2. Nausea No    3. Motor Blockade No, 0=Ability to raise lower extremities off bed    4. Sedation No, 1=awake and alert    Objective:        Catheter site clean, dry, intact        Vitals   Vitals:    06/12/23 0919   BP: (!) 185/81   Pulse: 79   Resp: 18   Temp: 36.4 °C (97.5 °F)        Labs    No results displayed because visit has over 200 results.           Meds   Current Facility-Administered Medications   Medication Dose Route Frequency Provider Last Rate Last Admin    0.9%  NaCl infusion (for blood administration)   Intravenous Q24H PRN Cristiane PACKER  MD Jasbir        0.9%  NaCl infusion (for blood administration)   Intravenous Q24H PRN Viki Mcneill MD        acetaminophen tablet 1,000 mg  1,000 mg Oral Q8H Sergio Mao MD   1,000 mg at 06/11/23 1302    apixaban tablet 2.5 mg  2.5 mg Oral BID Cristiane Martell MD   2.5 mg at 06/12/23 0920    dextrose 10% bolus 125 mL 125 mL  12.5 g Intravenous PRN Carlos Bess MD        dextrose 10% bolus 250 mL 250 mL  25 g Intravenous PRN Carlos Bess MD        dextrose 40 % gel 15,000 mg  15 g Oral PRN Carlos Bess MD        dextrose 40 % gel 30,000 mg  30 g Oral PRN Carlos Bess MD        diphenhydrAMINE capsule 25 mg  25 mg Oral Nightly PRN Viki Mcneill MD   25 mg at 06/11/23 2036    ferrous sulfate tablet 1 each  1 tablet Oral BID Viki Mcneill MD   1 each at 06/12/23 0920    glucagon (human recombinant) injection 1 mg  1 mg Intramuscular PRN Carlos Bess MD        levETIRAcetam tablet 250 mg  250 mg Oral Nightly Carlos Bess MD   250 mg at 06/11/23 2036    melatonin tablet 6 mg  6 mg Oral Nightly PRN Noemí Bravo NP   6 mg at 06/11/23 2036    methocarbamoL tablet 500 mg  500 mg Oral Q6H PRN Sergio Mao MD   500 mg at 06/12/23 0337    mirtazapine tablet 15 mg  15 mg Oral QHS Carlos Bess MD   15 mg at 06/09/23 2121    multivitamin tablet  1 tablet Oral Daily Carlos Bess MD   1 tablet at 06/12/23 0920    naloxone 0.4 mg/mL injection 0.02 mg  0.02 mg Intravenous PRN Carlos Bess MD        ondansetron injection 4 mg  4 mg Intravenous Q8H PRN Carlos Bess MD   4 mg at 06/08/23 0949    senna-docusate 8.6-50 mg per tablet 1 tablet  1 tablet Oral Daily PRN Nessa Sanchez MD   1 tablet at 06/10/23 2125    sodium chloride 0.9% flush 10 mL  10 mL Intravenous Q12H PRN Carlos Bess MD        vitamin D 1000 units tablet 1,000 Units  1,000 Units Oral Daily Maria Guadalupe Fang MD   1,000 Units at 06/12/23 0920        Anticoagulant dose Eliquis 2.5 mg BID    Assessment:     Pain  control adequate    Plan:            Patient doing well, continue present treatment.     1) PNC paused this AM with plans to pull.   2) Continue tylenol 1g q8hr  3) Continue Robaxin 500 mg q6hr PRN     Case discussed with staff, Dr. Perea; final recommendations per attestation above.      Thank you for your consult and allowing us to participate in the care of this patient. We will continue to follow along. Please call the Acute Pain Service/Anesthesia if you have any questions or concerns.     Elizabeth Hubbard MD  PGY3, CA2 Anesthesiology

## 2023-06-12 NOTE — ADDENDUM NOTE
Addendum  created 06/12/23 1301 by Carmen Ambrosio RN    Clinical Note Signed, Intraprocedure Event edited, LDA removed

## 2023-06-12 NOTE — ADDENDUM NOTE
Addendum  created 06/12/23 1144 by Liseth Perea MD    Charge Capture section accepted, Cosign clinical note with attestation

## 2023-06-13 ENCOUNTER — PATIENT OUTREACH (OUTPATIENT)
Dept: ADMINISTRATIVE | Facility: CLINIC | Age: 83
End: 2023-06-13
Payer: MEDICARE

## 2023-06-13 PROCEDURE — G0180 PR HOME HEALTH MD CERTIFICATION: ICD-10-PCS | Mod: ,,, | Performed by: ORTHOPAEDIC SURGERY

## 2023-06-13 PROCEDURE — G0180 MD CERTIFICATION HHA PATIENT: HCPCS | Mod: ,,, | Performed by: ORTHOPAEDIC SURGERY

## 2023-06-13 NOTE — PLAN OF CARE
[facility]  Discharge Final Note    Primary Care Provider: Josh Fajardo MD    Expected Discharge Date: 6/12/2023    Final Discharge Note (most recent)       Final Note - 06/13/23 1420          Final Note    Assessment Type Final Discharge Note     Anticipated Discharge Disposition Home-Health Care [facility]     What phone number can be called within the next 1-3 days to see how you are doing after discharge? --   [phone]    Hospital Resources/Appts/Education Provided Appointments scheduled and added to AVS        Post-Acute Status    Post-Acute Authorization Home Health     Post-Acute Placement Status Set-up Complete/Auth obtained                     Important Message from Medicare  Important Message from Medicare regarding Discharge Appeal Rights: Given to patient/caregiver, Explained to patient/caregiver, Signed/date by patient/caregiver     Date IMM was signed: 06/12/23  Time IMM was signed: 1106    Contact Info       [facility] [address]th [address]   Specialty: Orthopedics    [address]  [facility] [address]   Phone: [phone]       Next Steps: Follow up    Instructions: 2 weeks. keep bandages in place until orthopedics follow up. do not get wet or immerse in water.            Future Appointments   Date Time Provider Department Center   6/22/2023 10:30 AM Servando Barragan NP [facility]   7/20/2023  8:45 AM Sharon Lee PA-C [facility]   9/20/2023 10:45 AM Soy Pollock, DO [facility]     Patient discharged home to care of family and [facility] on 6/12/23.    Citlali Wyatt RNCM  Case Management  [facility]  [phone]

## 2023-06-15 ENCOUNTER — DOCUMENTATION ONLY (OUTPATIENT)
Dept: ORTHOPEDICS | Facility: CLINIC | Age: 83
End: 2023-06-15
Payer: MEDICARE

## 2023-06-15 ENCOUNTER — PATIENT MESSAGE (OUTPATIENT)
Dept: ORTHOPEDICS | Facility: CLINIC | Age: 83
End: 2023-06-15
Payer: MEDICARE

## 2023-06-15 NOTE — PROGRESS NOTES
Forwarded [facility] paperwork to the disability office for processing.

## 2023-06-16 ENCOUNTER — TELEPHONE (OUTPATIENT)
Dept: ORTHOPEDICS | Facility: CLINIC | Age: 83
End: 2023-06-16
Payer: MEDICARE

## 2023-06-16 NOTE — TELEPHONE ENCOUNTER
Called and explain to pt's daughter how the pt's [place] paperwork is being process and once completed it will be fax to the number she requested which is [phone] ATTN: Cesilia Nava. Pt's daughter verbally understood and was satisfied.

## 2023-06-16 NOTE — TELEPHONE ENCOUNTER
----- Message from Ann-Marie Haney sent at 6/16/2023  8:00 AM CDT -----  Regarding: pt advice  Contact: [phone]  Ally Steiner daughter/Alyssa calling regarding Patient Advice (message) for #requesting a call back from Elsa stating that she had some questions and that she would like to know if the paperwork was faxed over. Please call

## 2023-06-20 ENCOUNTER — TELEPHONE (OUTPATIENT)
Dept: ORTHOPEDICS | Facility: CLINIC | Age: 83
End: 2023-06-20
Payer: MEDICARE

## 2023-06-20 NOTE — TELEPHONE ENCOUNTER
----- Message from Mayra Muniz MA sent at 6/20/2023  9:26 AM CDT -----  Regarding: FW: FMLA Paperwork  Contact: Alyssa [phone]    ----- Message -----  From: Chelsi Stewart  Sent: 6/20/2023   8:51 AM CDT  To: [facility] Staff  Subject: FMLA Paperwork                                   Alyssa/ Daughter is calling stating sent over paperwork to be filled out for FMLA and want to know the status of that paperwork, states last conversation was with Elsa please call

## 2023-06-20 NOTE — TELEPHONE ENCOUNTER
Spoke with pt's daughter, Alyssa.  Advised that paperwork is complete and will be faxed to Cesilia Nava at [phone] today.   Alyssa verbalized understanding.

## 2023-06-20 NOTE — ADDENDUM NOTE
Addendum  created 06/20/23 1602 by Liseth Perea MD    Attestation recorded in Intraprocedure, Intraprocedure Attestations filed

## 2023-06-21 ENCOUNTER — TELEPHONE (OUTPATIENT)
Dept: ADMINISTRATIVE | Facility: CLINIC | Age: 83
End: 2023-06-21
Payer: MEDICARE

## 2023-06-21 ENCOUNTER — PATIENT OUTREACH (OUTPATIENT)
Dept: ADMINISTRATIVE | Facility: OTHER | Age: 83
End: 2023-06-21
Payer: MEDICARE

## 2023-06-21 NOTE — PROGRESS NOTES
I will continue call pt on [facility] platform.    
Depressed mood/Anhedonia/Impulsivity/Hopelessness or despair/Severe anxiety, agitation or panic

## 2023-06-22 ENCOUNTER — HOSPITAL ENCOUNTER (OUTPATIENT)
Dept: RADIOLOGY | Facility: HOSPITAL | Age: 83
Discharge: HOME | End: 2023-06-22
Attending: NURSE PRACTITIONER
Payer: MEDICARE

## 2023-06-22 ENCOUNTER — OFFICE VISIT (OUTPATIENT)
Dept: ORTHOPEDICS | Facility: CLINIC | Age: 83
End: 2023-06-22
Payer: MEDICARE

## 2023-06-22 VITALS — BODY MASS INDEX: 21.56 KG/M2 | HEIGHT: 60 IN | WEIGHT: 109.81 LBS

## 2023-06-22 DIAGNOSIS — M97.11XD PERIPROSTHETIC FRACTURE AROUND INTERNAL PROSTHETIC RIGHT KNEE JOINT, SUBSEQUENT ENCOUNTER: Primary | ICD-10-CM

## 2023-06-22 DIAGNOSIS — Z98.890 POST-OPERATIVE STATE: ICD-10-CM

## 2023-06-22 DIAGNOSIS — M89.8X5 PAIN IN RIGHT FEMUR: Primary | ICD-10-CM

## 2023-06-22 DIAGNOSIS — M89.8X5 PAIN IN RIGHT FEMUR: ICD-10-CM

## 2023-06-22 PROCEDURE — 1157F PR ADVANCE CARE PLAN OR EQUIV PRESENT IN MEDICAL RECORD: ICD-10-PCS | Mod: CPTII,S$GLB,, | Performed by: NURSE PRACTITIONER

## 2023-06-22 PROCEDURE — 99024 PR POST-OP FOLLOW-UP VISIT: ICD-10-PCS | Mod: S$GLB,,, | Performed by: NURSE PRACTITIONER

## 2023-06-22 PROCEDURE — 99999 PR PBB SHADOW E&M-EST. PATIENT-LVL III: CPT | Mod: PBBFAC,,, | Performed by: NURSE PRACTITIONER

## 2023-06-22 PROCEDURE — 99999 PR PBB SHADOW E&M-EST. PATIENT-LVL III: ICD-10-PCS | Mod: PBBFAC,,, | Performed by: NURSE PRACTITIONER

## 2023-06-22 PROCEDURE — 1125F AMNT PAIN NOTED PAIN PRSNT: CPT | Mod: CPTII,S$GLB,, | Performed by: NURSE PRACTITIONER

## 2023-06-22 PROCEDURE — 1160F PR REVIEW ALL MEDS BY PRESCRIBER/CLIN PHARMACIST DOCUMENTED: ICD-10-PCS | Mod: CPTII,S$GLB,, | Performed by: NURSE PRACTITIONER

## 2023-06-22 PROCEDURE — 73552 XR FEMUR 2 VIEW RIGHT: ICD-10-PCS | Mod: 26,RT,, | Performed by: RADIOLOGY

## 2023-06-22 PROCEDURE — 1125F PR PAIN SEVERITY QUANTIFIED, PAIN PRESENT: ICD-10-PCS | Mod: CPTII,S$GLB,, | Performed by: NURSE PRACTITIONER

## 2023-06-22 PROCEDURE — 73552 X-RAY EXAM OF FEMUR 2/>: CPT | Mod: TC,RT

## 2023-06-22 PROCEDURE — 73552 X-RAY EXAM OF FEMUR 2/>: CPT | Mod: 26,RT,, | Performed by: RADIOLOGY

## 2023-06-22 PROCEDURE — 99024 POSTOP FOLLOW-UP VISIT: CPT | Mod: S$GLB,,, | Performed by: NURSE PRACTITIONER

## 2023-06-22 PROCEDURE — 1159F MED LIST DOCD IN RCRD: CPT | Mod: CPTII,S$GLB,, | Performed by: NURSE PRACTITIONER

## 2023-06-22 PROCEDURE — 1159F PR MEDICATION LIST DOCUMENTED IN MEDICAL RECORD: ICD-10-PCS | Mod: CPTII,S$GLB,, | Performed by: NURSE PRACTITIONER

## 2023-06-22 PROCEDURE — 1160F RVW MEDS BY RX/DR IN RCRD: CPT | Mod: CPTII,S$GLB,, | Performed by: NURSE PRACTITIONER

## 2023-06-22 PROCEDURE — 1157F ADVNC CARE PLAN IN RCRD: CPT | Mod: CPTII,S$GLB,, | Performed by: NURSE PRACTITIONER

## 2023-06-22 NOTE — PROGRESS NOTES
Ms. Steiner is here today for a post-operative visit after undergoing a retrograde IM nail fixation for her right distal femur fracture with side plate augmentation by Dr. Min on 6/8/2023.    Interval History:  She reports that she is doing ok.  Pain is tolerable.  Patient has dementia, her family has been using only Tylenol to treat her pain.  She is not taking pain medication.  Family reports that the patient frequently picked at her dressing secondary to dementia.  They tried distraction techniques.  Family denies any falls or injuries since her discharge home.  She has not placed any weight down.  She denies fever, chills, and sweats since the time of the surgery.     Physical exam:  Dressing taken down.  Incision is clean, dry and intact.  No signs of infection noted.  Skin was closed with Dermabond and Stratifix ends were clipped.  She has tactile stimulation to their lower leg, she denies calf pain, there is no leg edema and their pedal pulse is palpable x 2.     RADS:  X-ray of the right femur was obtained, findings show IM nailing appears to be in good position and alignment.  Her distal femur fracture appears to be stable.  She also has a right total knee arthroplasty that appears to be in good position and alignment.  No evidence of hardware failure or new fracture seen.    Assessment:  Post-op visit (2 weeks)    Plan:    ICD-10-CM ICD-9-CM   1. Periprosthetic fracture around internal prosthetic right knee joint, subsequent encounter s/p IM nail on 6/8/23  M97.11XD V54.89   2. Post-operative state  Z98.890 V45.89     Current care, treatment plan, precautions, activity level/ modifications, limitations, rehabilitation exercises and proposed future treatment were discussed with the patient. We discussed the need to monitor for changes in symptoms and condition and report them to the physician.  Discussed importance of compliance with all appointments and follow up examinations.     WOUND CARE  ORDERS  -Ally was advised to keep the incision clean and dry for the next 24 hours after which she may wash the area with antibacterial soap in the shower.   -She not submerge until the incision is completely healed  -Patient was advised to monitor wound closely and multiple times daily for any problems. Call clinic immediately or report to ED for immediate medical attention for any complications including reopening of wound, drainage, purulence, redness, streaking, odor, pain out of proportion, fever, chills, etc.   - If there are signs of infection, please call Ortho Clinic [phone] for further instructions and to make appt to be seen.       PHYSICAL THERAPY:   - Patient is NWB to her RLE.  - Weight bearing - NWB to RLE x 8 weeks pending fracture healing  - Range of motion as tolerated.      PAIN MEDICATION:   - Pain medication: refill was not needed  - Pain medication refill policy provided to patient for review, yes.    - Patient was informed a multi-modal approach is used to treat their pain.     DVT PROPHYLAXIS:   - Eliquis 2.5 mg bid     FOLLOW UP:   - Patient will follow up in the clinic in 4 weeks.  - X-ray of her right femur is needed.    - Future Appointments:   Future Appointments   Date Time Provider Department Center   7/20/2023  8:45 AM Sharon Lee PA-C [facility] ORTHO [facility]   9/20/2023 10:45 AM Soy Pollock, DO [facility]           If there are any questions prior to scheduled follow up, the patient was instructed to contact the office

## 2023-06-22 NOTE — PROGRESS NOTES
CHW - Initial Contact    This Community Health Worker completed OR updated the Social Determinant of Health questionnaire with caregiver via telephone today.    Pt identified barriers of most importance are: Daughter complains of hospital stay. Asked daughter to call were here to listen: reach us at [phone].   Referrals to community agencies completed with patient/caregiver consent outside of [facility] include: no  Referrals were put through [facility] - no  Support and Services: none  Other information discussed the patient needs / wants help with: SDOH   Follow up required: yes  Follow-up Outreach - Due: 6/30/2023

## 2023-07-20 ENCOUNTER — PATIENT MESSAGE (OUTPATIENT)
Dept: ORTHOPEDICS | Facility: CLINIC | Age: 83
End: 2023-07-20

## 2023-07-20 ENCOUNTER — OFFICE VISIT (OUTPATIENT)
Dept: ORTHOPEDICS | Facility: CLINIC | Age: 83
End: 2023-07-20
Payer: MEDICARE

## 2023-07-20 ENCOUNTER — HOSPITAL ENCOUNTER (OUTPATIENT)
Dept: RADIOLOGY | Facility: HOSPITAL | Age: 83
Discharge: HOME | End: 2023-07-20
Attending: PHYSICIAN ASSISTANT
Payer: MEDICARE

## 2023-07-20 VITALS — HEIGHT: 60 IN | BODY MASS INDEX: 21.4 KG/M2 | WEIGHT: 109 LBS

## 2023-07-20 DIAGNOSIS — M89.8X5 PAIN IN RIGHT FEMUR: ICD-10-CM

## 2023-07-20 DIAGNOSIS — M89.8X5 PAIN IN RIGHT FEMUR: Primary | ICD-10-CM

## 2023-07-20 DIAGNOSIS — M80.80XA PATHOLOGICAL FRACTURE DUE TO OSTEOPOROSIS, UNSPECIFIED FRACTURE SITE, UNSPECIFIED OSTEOPOROSIS TYPE, INITIAL ENCOUNTER: Primary | ICD-10-CM

## 2023-07-20 DIAGNOSIS — M81.0 OSTEOPOROSIS, UNSPECIFIED OSTEOPOROSIS TYPE, UNSPECIFIED PATHOLOGICAL FRACTURE PRESENCE: ICD-10-CM

## 2023-07-20 DIAGNOSIS — M81.6 LOCALIZED OSTEOPOROSIS OF LEQUESNE: ICD-10-CM

## 2023-07-20 PROCEDURE — 73552 XR FEMUR 2 VIEW RIGHT: ICD-10-PCS | Mod: 26,RT,, | Performed by: RADIOLOGY

## 2023-07-20 PROCEDURE — 99214 PR OFFICE/OUTPT VISIT, EST, LEVL IV, 30-39 MIN: ICD-10-PCS | Mod: 24,S$GLB,, | Performed by: PHYSICIAN ASSISTANT

## 2023-07-20 PROCEDURE — 1160F PR REVIEW ALL MEDS BY PRESCRIBER/CLIN PHARMACIST DOCUMENTED: ICD-10-PCS | Mod: CPTII,S$GLB,, | Performed by: PHYSICIAN ASSISTANT

## 2023-07-20 PROCEDURE — 1159F MED LIST DOCD IN RCRD: CPT | Mod: CPTII,S$GLB,, | Performed by: PHYSICIAN ASSISTANT

## 2023-07-20 PROCEDURE — 73552 X-RAY EXAM OF FEMUR 2/>: CPT | Mod: 26,RT,, | Performed by: RADIOLOGY

## 2023-07-20 PROCEDURE — 99999 PR PBB SHADOW E&M-EST. PATIENT-LVL III: CPT | Mod: PBBFAC,,, | Performed by: PHYSICIAN ASSISTANT

## 2023-07-20 PROCEDURE — 99999 PR PBB SHADOW E&M-EST. PATIENT-LVL II: CPT | Mod: PBBFAC,,, | Performed by: PHYSICIAN ASSISTANT

## 2023-07-20 PROCEDURE — 99024 PR POST-OP FOLLOW-UP VISIT: ICD-10-PCS | Mod: S$GLB,,, | Performed by: PHYSICIAN ASSISTANT

## 2023-07-20 PROCEDURE — 99214 OFFICE O/P EST MOD 30 MIN: CPT | Mod: 24,S$GLB,, | Performed by: PHYSICIAN ASSISTANT

## 2023-07-20 PROCEDURE — 1125F PR PAIN SEVERITY QUANTIFIED, PAIN PRESENT: ICD-10-PCS | Mod: CPTII,S$GLB,, | Performed by: PHYSICIAN ASSISTANT

## 2023-07-20 PROCEDURE — 99024 POSTOP FOLLOW-UP VISIT: CPT | Mod: S$GLB,,, | Performed by: PHYSICIAN ASSISTANT

## 2023-07-20 PROCEDURE — 99999 PR PBB SHADOW E&M-EST. PATIENT-LVL III: ICD-10-PCS | Mod: PBBFAC,,, | Performed by: PHYSICIAN ASSISTANT

## 2023-07-20 PROCEDURE — 1157F PR ADVANCE CARE PLAN OR EQUIV PRESENT IN MEDICAL RECORD: ICD-10-PCS | Mod: CPTII,S$GLB,, | Performed by: PHYSICIAN ASSISTANT

## 2023-07-20 PROCEDURE — 99999 PR PBB SHADOW E&M-EST. PATIENT-LVL II: ICD-10-PCS | Mod: PBBFAC,,, | Performed by: PHYSICIAN ASSISTANT

## 2023-07-20 PROCEDURE — 73552 X-RAY EXAM OF FEMUR 2/>: CPT | Mod: TC,RT

## 2023-07-20 PROCEDURE — 1125F AMNT PAIN NOTED PAIN PRSNT: CPT | Mod: CPTII,S$GLB,, | Performed by: PHYSICIAN ASSISTANT

## 2023-07-20 PROCEDURE — 1159F PR MEDICATION LIST DOCUMENTED IN MEDICAL RECORD: ICD-10-PCS | Mod: CPTII,S$GLB,, | Performed by: PHYSICIAN ASSISTANT

## 2023-07-20 PROCEDURE — 1157F ADVNC CARE PLAN IN RCRD: CPT | Mod: CPTII,S$GLB,, | Performed by: PHYSICIAN ASSISTANT

## 2023-07-20 PROCEDURE — 1160F RVW MEDS BY RX/DR IN RCRD: CPT | Mod: CPTII,S$GLB,, | Performed by: PHYSICIAN ASSISTANT

## 2023-07-20 RX ORDER — IBANDRONATE SODIUM 150 MG/1
150 TABLET, FILM COATED ORAL
COMMUNITY
Start: 2023-06-26 | End: 2023-09-28 | Stop reason: ALTCHOICE

## 2023-07-20 RX ORDER — QUETIAPINE FUMARATE 50 MG/1
50 TABLET, FILM COATED ORAL NIGHTLY
COMMUNITY
Start: 2023-07-18

## 2023-07-20 NOTE — PROGRESS NOTES
SUBJECTIVE:     Chief Complaint & History of Present Illness:  Ally Steiner is a new patient 83 y.o. female who is seen here today for a bone health evaluation for osteoporosis, fracture prevention, and risk evaluation for future fractures.        she was appropriately identified and referred by Sharon Lee PA-C due to concerns for compromised bone quality, and risk of future fractures.  This visit is medically necessary to identify risk, investigate and initiative treatment as appropriate to improve bone quality and strength for the reduction of secondary fractures.     her medical history, medications and fracture history will be reviewed and summarized      Review of patient's allergies indicates:  No Known Allergies      Current Outpatient Medications   Medication Sig Dispense Refill    acetaminophen (TYLENOL) 500 MG tablet Take 2 tablets (1,000 mg total) by mouth every 8 (eight) hours.  0    apixaban (ELIQUIS) 2.5 mg Tab Take 1 tablet (2.5 mg total) by mouth 2 (two) times daily. 80 tablet 0    ferrous sulfate 220 mg (44 mg iron)/5 mL solution Take 5 mLs (220 mg total) by mouth 2 (two) times daily.  0    furosemide (LASIX) 20 MG tablet TAKE 1 TABLET BY MOUTH ONCE DAILY AS NEEDED FOR EDEMA      ibandronate (BONIVA) 150 mg tablet Take 150 mg by mouth every 30 days.      levETIRAcetam (KEPPRA) 250 MG Tab Take 1 tablet (250 mg total) by mouth nightly. 30 tablet 5    methocarbamoL (ROBAXIN) 500 MG Tab Take 1 tablet (500 mg total) by mouth every 6 (six) hours as needed (pain scale 4-7). 45 tablet 0    multivitamin capsule Take 1 capsule by mouth once daily.      potassium chloride SA (K-DUR,KLOR-CON M) 10 MEQ tablet Take 10 mEq by mouth daily as needed.      QUEtiapine (SEROQUEL) 50 MG tablet Take 50 mg by mouth every evening.      vitamin D (VITAMIN D3) 1000 units Tab Take 1 tablet (1,000 Units total) by mouth once daily. 60 tablet 1    walker Misc For use with ambulation      abaloparatide (TYMLOS)  80 mcg (3,120 mcg/1.56 mL) PnIj Inject 80 mcg into the skin once daily. 1.56 mL 11     No current facility-administered medications for this visit.       Past Medical History:   Diagnosis Date    Essential hypertension     Diet controlled. bp well controlled today.     History of hypertension     always been able to manage with diet    Hx of diabetes mellitus     Always controlled with diet and weight loss    Hyperlipidemia     always been able to manage with diet    Late onset Alzheimer's disease with behavioral disturbance 8/6/2020    Established with neuropsych. Due for follow up ~10/2021. Family helps with ADLs. Does not drive    Memory loss     Osteoarthritis     Pure hypercholesterolemia     always been able to manage with diet    Seizure disorder 8/26/2022    Stage 3a chronic kidney disease 1/13/2021    Reviewed labs, plan for more labs next visit.        Past Surgical History:   Procedure Laterality Date    CHOLECYSTECTOMY      Hysterectomy      KNEE SURGERY Left 2018    knee replacement    KNEE SURGERY Right 2000    knee replacement    OPEN REDUCTION OF FRACTURE OF MANDIBLE Left 1/6/2021    Procedure: OPEN REDUCTION, FRACTURE, MANDIBLE;  Surgeon: Dar Mcgill MD;  Location: [facility] OR [address] [place] [address];  Service: ENT;  Laterality: Left;    REDUCTION OF BOTH BREASTS Bilateral 1990    RETROGRADE INTRAMEDULLARY RODDING OF DISTAL FEMUR Right 6/8/2023    Procedure: INSERTION, INTRAMEDULLARY FARHAT, FEMUR, DISTAL, RETROGRADE - right. trios, synthes, c arm door side;  Surgeon: Nicolas Min MD;  Location: [facility] OR [address] [place] [address];  Service: Orthopedics;  Laterality: Right;       Lab Results   Component Value Date     07/20/2023    K 4.6 07/20/2023     07/20/2023    CO2 27 07/20/2023     (H) 07/20/2023    BUN 15 07/20/2023    CREATININE 1.1 07/20/2023    CALCIUM 9.2 07/20/2023    PROT 7.9 07/20/2023    ALBUMIN 3.2 (L) 07/20/2023    BILITOT 0.3 07/20/2023    ALKPHOS 100 07/20/2023    AST 15 07/20/2023    ALT 10  07/20/2023    ANIONGAP 8 07/20/2023    ESTGFRAFRICA 88 10/17/2022    EGFRNONAA 53.0 (A) 07/27/2022      Lab Results   Component Value Date    WBC 6.61 06/12/2023    RBC 3.37 (L) 06/12/2023    HGB 9.1 (L) 06/12/2023    HCT 29.5 (L) 06/12/2023    MCV 88 06/12/2023    MCH 27.0 06/12/2023    MCHC 30.8 (L) 06/12/2023    RDW 16.2 (H) 06/12/2023     06/12/2023    MPV 10.9 06/12/2023    GRAN 4.4 06/12/2023    GRAN 66.0 06/12/2023    LYMPH 1.4 06/12/2023    LYMPH 20.6 06/12/2023    MONO 0.6 06/12/2023    MONO 8.8 06/12/2023    EOS 0.2 06/12/2023    BASO 0.04 06/12/2023    EOSINOPHIL 3.5 06/12/2023    BASOPHIL 0.6 06/12/2023    DIFFMETHOD Automated 06/12/2023      Lab Results   Component Value Date    MG 1.6 06/09/2023      Lab Results   Component Value Date    PHOS 2.6 (L) 06/09/2023      Lab Results   Component Value Date    YZBJQDIY90CD 41 07/20/2023      Lab Results   Component Value Date    PTH 75.9 07/20/2023      Lab Results   Component Value Date    TSH 1.838 07/20/2023      Lab Results   Component Value Date    FREET4 0.95 07/20/2023      Lab Results   Component Value Date    HGBA1C 5.6 06/07/2023    ESTIMATEDAVG 114 06/07/2023      No results found for: FREETESTOSTE         Vital Signs (Most Recent)  There were no vitals filed for this visit.      Today's Visit and Bone Health History     Have you had any loss of height or gotten shorter since your 20's?  4 or more   Are you postmenopausal?  Yes   Please indicate how menopause occured. Surgical hysterectomy with ovaries removed   Please indicate at what age you became postmenopausal. Fifties   Have you ever taken any type of hormone replacement therapy? No   Do you currently smoke? No   Have you ever smoked in the past? No   How many alcoholic beverages do you drink per day? 0   How many caffeinated beverages do you drink per day? 1 to 3   Have you had 2 or more falls in the past 12 months? No   How active have you been in the past 12 months? Not active ( in  the house only )   Did either of your parents have a hip fracture after the age of 50? No   Have you ever been diagnosed with any of the following? Fracture   Do you currently have a fracture? No   Have you broken any other bones since you turned 50 or older? Yes   Please list all bones broken since you turned 50 or older. Jaw and leg   Have you ever had a bone density test or DXA scan? Yes   Are you currently taking or have you ever taken any of the following medications? None   Do you take Calcium? No   Do you take Vitamin D? Yes   If you take Vitamin D what dose? N/a   Have you ever been diagnosed with cancer? No   Have you ever been treated for cancer with high beam radiation or had radioactive implants? No      she has medical history and medication use known to be associated with bone loss and osteoporosis to include previous diagnosis of osteoporosis currently on Boniva, seizure disorder, pathological femur fracture, chronic kidney disease, iron deficiency anemia, diabetes type 2      The last DXA was performed in 06/20/2022.          T-Score Hip: -3.5     T-Score Spine: -4.7          Review of Systems:  ROS:  Constitutional: no fever or chills  Eyes: no visual changes  ENT: no nasal congestion or sore throat  Respiratory: no respiratory symptoms  Cardiovascular: no chest pain or palpitations, hypertension  Gastrointestinal: no nausea or vomiting, tolerating diet  Genitourinary: no hematuria or dysuria, CKD stage IIIA  Integument/Breast: no rash or pruritis  Hematologic/Lymphatic: no easy bruising or lymphadenopathy, iron deficiency anemia anemia of chronic disease  Musculoskeletal: no arthralgias or myalgias  Neurological:  Positive seizure disorder, memory loss, late onset Alzheimer's with behavioral disturbances  Behavioral/Psych: no auditory or visual hallucinations  Endocrine: no heat or cold intolerance      OBJECTIVE:     PHYSICAL EXAM:     ,                  General: no acute  distress  Behavioral/Psych: non-anxious  Skin: no rash  Head/Neck: atraumatic, normocephalic, without obvious abnormality  Respiratory: normal respiratory effort  Cardiac: regular rate and rhythm  Vascular: pulses present  Abdomen: soft, non-tender  Musculoskeletal: no joint tenderness, deformity or swelling               ASSESSMENT/PLAN:     Assessment:    Osteoporosis, at high risk for future fractures, history of pathological femur fracture.    Plan:   30-45 minutes spent in face-to-face consultation with patient and her family members today, discussing the disease management of osteoporosis, for the reduction of future fractures.  I have explained that bone strength is equal to bone quality. A bone density / DEXA scan is important to complement her care since her fracture was by definition a fragility fracture/traumatic fracture.  Over half of the encounter was spent (50%) counseling the patient on the disease of osteoporosis evidence-based and best practice treatment options available as well as recommendations for improvement of bone quality, the importance of nutritional supplements to include:  Calcium 6295-9281 mg daily in divided doses   Vitamin D3  8206-9804 units daily in divided doses.   Fall prevention and personal safety for the reduction of future fractures.    Clinicians Guidelines for the treatment of Osteoporosis 2020 as part of the National Osteoporosis Foundation were utilized as the evidence-based information provided.    Discussed pharmaceutical treatment options to include Biphosphatases, Denosumab, Abaloparatide and Teriparatide. Information to include indications for therapy, risks and benefits to treatment, and important safety information related to these treatments was provided and discussed.  Handouts were given to the patient for her review on osteoporosis and other pharmacological treatment information related to other available treatment options.    The importance of diet, impact  exercise, and core strengthening with gait and balance exercise, through  both formal physical therapy programs and home exercise programs was discussed.       Bone health labs recommended and ordered    Tymlos ordered as first-line treatment will contact the patient following labs to discuss any abnormalities

## 2023-07-21 ENCOUNTER — TELEPHONE (OUTPATIENT)
Dept: ORTHOPEDICS | Facility: CLINIC | Age: 83
End: 2023-07-21
Payer: MEDICARE

## 2023-07-21 NOTE — TELEPHONE ENCOUNTER
Called and left a VM to return my call to discuss  the brace     ----- Message from Elsa Hawk MA sent at 7/20/2023  3:05 PM CDT -----  Regarding: FW: pt advice  Contact: jack Shah @[phone]  Sharon    Please advise    Andria    ----- Message -----  From: Bushra BLAS May  Sent: 7/20/2023   2:55 PM CDT  To: [facility] Her[facility] Staff  Subject: pt advice                                        Caller stated pt has dementia and will not sit still. Caller requesting to have a brace for pt to help. Caller stated she kept trying to get up today to walk. It has been very difficult to get the pt to sit down. Pls call to discuss.

## 2023-07-24 ENCOUNTER — TELEPHONE (OUTPATIENT)
Dept: ORTHOPEDICS | Facility: CLINIC | Age: 83
End: 2023-07-24
Payer: MEDICARE

## 2023-07-24 DIAGNOSIS — M97.11XD PERIPROSTHETIC FRACTURE AROUND INTERNAL PROSTHETIC RIGHT KNEE JOINT, SUBSEQUENT ENCOUNTER: Primary | ICD-10-CM

## 2023-07-24 NOTE — TELEPHONE ENCOUNTER
Called and discussed Will order knee brace to help remind patient to not place weight.   ----- Message from Latosha Llanos MA sent at 7/24/2023  1:54 PM CDT -----  Regarding: FW: pt advice  Contact: jack Shah @[phone]    ----- Message -----  From: Bushra BLAS May  Sent: 7/24/2023  11:51 AM CDT  To: [facility] Herrera Staff  Subject: pt advice                                        Caller requesting call back from provider in regards to getting a brace to keep the leg in position right. Pls call to discuss.

## 2023-07-25 ENCOUNTER — TELEPHONE (OUTPATIENT)
Dept: PHARMACY | Facility: CLINIC | Age: 83
End: 2023-07-25
Payer: MEDICARE

## 2023-07-25 NOTE — TELEPHONE ENCOUNTER
Hello, this is Felicia Severino, clinical pharmacist with [facility] that is part of your care team.  We have begun working on your prescription that your doctor has sent us. Our next steps include:     Working with your insurance company to obtain approval for your medication  Working with you to ensure your medication is affordable     We will be calling you along the way with updates on your medication but if you have any concerns or receive information that you would like to discuss please reach us at [phone].    Welcome call outcome: Left voicemail

## 2023-07-28 ENCOUNTER — SPECIALTY PHARMACY (OUTPATIENT)
Dept: PHARMACY | Facility: CLINIC | Age: 83
End: 2023-07-28
Payer: MEDICARE

## 2023-07-28 ENCOUNTER — EXTERNAL HOME HEALTH (OUTPATIENT)
Dept: HOME HEALTH SERVICES | Facility: HOSPITAL | Age: 83
End: 2023-07-28
Payer: MEDICARE

## 2023-07-28 NOTE — TELEPHONE ENCOUNTER
Tymlos approved. Request Reference Number: [id number]. TYMLOS INJ is approved through 12/31/2023    Test claim: [id number]    Forward to FA

## 2023-08-04 NOTE — PROGRESS NOTES
Ms. Steiner is here today for a post-operative visit after undergoing a retrograde IM nail fixation for her right distal femur fracture with side plate augmentation by Dr. Min on 6/8/2023.    Interval History:  She reports that she is doing ok.  Pain is tolerable.  Patient has dementia, her family has been using only Tylenol to treat her pain.  She is not taking pain medication.  Family reports that the patient frequently gets up and [places full weight.  They tried distraction techniques.  Family denies any falls or injuries since her discharge home.  S  She denies fever, chills, and sweats since the time of the surgery.     Physical exam:    Incision is clean, dry and intact.  No signs of infection noted.       She has tactile stimulation to their lower leg, she denies calf pain, there is no leg edema and their pedal pulse is palpable x 2.TTP at fracture site      RADS:  X-ray of the right femur was obtained,   Patient looks to osteoporosis.  Old appearing fractures are noted at the bilateral inferior pubic rami.  Right hip joint space is minimally narrowed.  Right knee demonstrates findings from a total knee arthroplasty.  Supracondylar region of the distal femur again shows a fracture in the healing phase with callus present.  Major distal fragment is a little displaced medially and posteriorly.  Internal fixation is provided by long intramedullary yanelis with interlocking screws.  Soft tissue swelling is present in the knee region.  Vascular calcifications are also noted.nment.  No evidence of hardware failure or new fracture seen.    Assessment:  Post-op visit (6 weeks)    Plan:    ICD-10-CM ICD-9-CM   1. Pain in right femur  M89.8X5 733.90     Current care, treatment plan, precautions, activity level/ modifications, limitations, rehabilitation exercises and proposed future treatment were discussed with the patient. We discussed the need to monitor for changes in symptoms and condition and report them to  the physician.  Discussed importance of compliance with all appointments and follow up examinations.     WOUND CARE ORDERS  - well healed   -She not submerge until the incision is completely healed  -Patient was advised to monitor wound closely and multiple times daily for any problems. Call clinic immediately or report to ED for immediate medical attention for any complications including reopening of wound, drainage, purulence, redness, streaking, odor, pain out of proportion, fever, chills, etc.   - If there are signs of infection, please call Ortho Clinic [phone] for further instructions and to make appt to be seen.       PHYSICAL THERAPY:   - Patient is NWB to her RLE.  - Weight bearing - NWB to RLE x 8 weeks pending fracture healing  - Range of motion as tolerated.      PAIN MEDICATION:   - Pain medication: refill was not needed  - Pain medication refill policy provided to patient for review, yes.    - Patient was informed a multi-modal approach is used to treat their pain.     DVT PROPHYLAXIS:   - Eliquis 2.5 mg bid     FOLLOW UP:   - Patient will follow up in the clinic in 6 weeks.  - X-ray of her right femur is needed.    - Future Appointments:   Future Appointments   Date Time Provider Department Center   8/11/2023 10:00 AM Sharon Lee PA-C [facility] ORTHO [facility]   8/11/2023 11:00 AM [facility]   9/20/2023 10:45 AM Soy Pollock, DO [facility]           If there are any questions prior to scheduled follow up, the patient was instructed to contact the office

## 2023-08-11 ENCOUNTER — HOSPITAL ENCOUNTER (OUTPATIENT)
Dept: RADIOLOGY | Facility: HOSPITAL | Age: 83
Discharge: HOME | End: 2023-08-11
Attending: PHYSICIAN ASSISTANT
Payer: MEDICARE

## 2023-08-11 ENCOUNTER — OFFICE VISIT (OUTPATIENT)
Dept: ORTHOPEDICS | Facility: CLINIC | Age: 83
End: 2023-08-11
Payer: MEDICARE

## 2023-08-11 VITALS — WEIGHT: 108.94 LBS | HEIGHT: 60 IN | BODY MASS INDEX: 21.39 KG/M2

## 2023-08-11 DIAGNOSIS — M97.11XD PERIPROSTHETIC FRACTURE AROUND INTERNAL PROSTHETIC RIGHT KNEE JOINT, SUBSEQUENT ENCOUNTER: ICD-10-CM

## 2023-08-11 DIAGNOSIS — M81.6 LOCALIZED OSTEOPOROSIS OF LEQUESNE: ICD-10-CM

## 2023-08-11 DIAGNOSIS — M97.11XD PERIPROSTHETIC FRACTURE AROUND INTERNAL PROSTHETIC RIGHT KNEE JOINT, SUBSEQUENT ENCOUNTER: Primary | ICD-10-CM

## 2023-08-11 DIAGNOSIS — M80.80XA PATHOLOGICAL FRACTURE DUE TO OSTEOPOROSIS, UNSPECIFIED FRACTURE SITE, UNSPECIFIED OSTEOPOROSIS TYPE, INITIAL ENCOUNTER: ICD-10-CM

## 2023-08-11 DIAGNOSIS — M81.0 OSTEOPOROSIS, UNSPECIFIED OSTEOPOROSIS TYPE, UNSPECIFIED PATHOLOGICAL FRACTURE PRESENCE: ICD-10-CM

## 2023-08-11 PROCEDURE — 99999 PR PBB SHADOW E&M-EST. PATIENT-LVL III: ICD-10-PCS | Mod: PBBFAC,,, | Performed by: PHYSICIAN ASSISTANT

## 2023-08-11 PROCEDURE — 1160F RVW MEDS BY RX/DR IN RCRD: CPT | Mod: CPTII,S$GLB,, | Performed by: PHYSICIAN ASSISTANT

## 2023-08-11 PROCEDURE — 1157F ADVNC CARE PLAN IN RCRD: CPT | Mod: CPTII,S$GLB,, | Performed by: PHYSICIAN ASSISTANT

## 2023-08-11 PROCEDURE — 99024 PR POST-OP FOLLOW-UP VISIT: ICD-10-PCS | Mod: S$GLB,,, | Performed by: PHYSICIAN ASSISTANT

## 2023-08-11 PROCEDURE — 1126F PR PAIN SEVERITY QUANTIFIED, NO PAIN PRESENT: ICD-10-PCS | Mod: CPTII,S$GLB,, | Performed by: PHYSICIAN ASSISTANT

## 2023-08-11 PROCEDURE — 99214 OFFICE O/P EST MOD 30 MIN: CPT | Mod: 24,S$GLB,, | Performed by: PHYSICIAN ASSISTANT

## 2023-08-11 PROCEDURE — 73552 XR FEMUR 2 VIEW RIGHT: ICD-10-PCS | Mod: 26,RT,, | Performed by: RADIOLOGY

## 2023-08-11 PROCEDURE — 99999 PR PBB SHADOW E&M-EST. PATIENT-LVL III: CPT | Mod: PBBFAC,,, | Performed by: PHYSICIAN ASSISTANT

## 2023-08-11 PROCEDURE — 73552 X-RAY EXAM OF FEMUR 2/>: CPT | Mod: 26,RT,, | Performed by: RADIOLOGY

## 2023-08-11 PROCEDURE — 1157F PR ADVANCE CARE PLAN OR EQUIV PRESENT IN MEDICAL RECORD: ICD-10-PCS | Mod: CPTII,S$GLB,, | Performed by: PHYSICIAN ASSISTANT

## 2023-08-11 PROCEDURE — 1159F MED LIST DOCD IN RCRD: CPT | Mod: CPTII,S$GLB,, | Performed by: PHYSICIAN ASSISTANT

## 2023-08-11 PROCEDURE — 1125F AMNT PAIN NOTED PAIN PRSNT: CPT | Mod: CPTII,S$GLB,, | Performed by: PHYSICIAN ASSISTANT

## 2023-08-11 PROCEDURE — 99024 POSTOP FOLLOW-UP VISIT: CPT | Mod: S$GLB,,, | Performed by: PHYSICIAN ASSISTANT

## 2023-08-11 PROCEDURE — 73552 X-RAY EXAM OF FEMUR 2/>: CPT | Mod: TC,RT

## 2023-08-11 PROCEDURE — 99214 PR OFFICE/OUTPT VISIT, EST, LEVL IV, 30-39 MIN: ICD-10-PCS | Mod: 24,S$GLB,, | Performed by: PHYSICIAN ASSISTANT

## 2023-08-11 PROCEDURE — 1159F PR MEDICATION LIST DOCUMENTED IN MEDICAL RECORD: ICD-10-PCS | Mod: CPTII,S$GLB,, | Performed by: PHYSICIAN ASSISTANT

## 2023-08-11 PROCEDURE — 1160F PR REVIEW ALL MEDS BY PRESCRIBER/CLIN PHARMACIST DOCUMENTED: ICD-10-PCS | Mod: CPTII,S$GLB,, | Performed by: PHYSICIAN ASSISTANT

## 2023-08-11 PROCEDURE — 1126F AMNT PAIN NOTED NONE PRSNT: CPT | Mod: CPTII,S$GLB,, | Performed by: PHYSICIAN ASSISTANT

## 2023-08-11 PROCEDURE — 1125F PR PAIN SEVERITY QUANTIFIED, PAIN PRESENT: ICD-10-PCS | Mod: CPTII,S$GLB,, | Performed by: PHYSICIAN ASSISTANT

## 2023-08-11 NOTE — PROGRESS NOTES
Chief Complaint & History of Present Illness:  Ally Steiner is a established patient 83 y.o. female who is seen here today for review of lab results, DEXA scan results,  we will proceed with Tymlos as her treatment option today for her osteoporosis and to reduce risk of future fractures.        Review of patient's allergies indicates:  No Known Allergies      Current Outpatient Medications   Medication Sig Dispense Refill    abaloparatide (TYMLOS) 80 mcg (3,120 mcg/1.56 mL) PnIj Inject 80 mcg into the skin once daily. 1.56 mL 11    acetaminophen (TYLENOL) 500 MG tablet Take 2 tablets (1,000 mg total) by mouth every 8 (eight) hours.  0    ferrous sulfate 220 mg (44 mg iron)/5 mL solution Take 5 mLs (220 mg total) by mouth 2 (two) times daily.  0    furosemide (LASIX) 20 MG tablet TAKE 1 TABLET BY MOUTH ONCE DAILY AS NEEDED FOR EDEMA      ibandronate (BONIVA) 150 mg tablet Take 150 mg by mouth every 30 days.      levETIRAcetam (KEPPRA) 250 MG Tab Take 1 tablet (250 mg total) by mouth nightly. 30 tablet 5    methocarbamoL (ROBAXIN) 500 MG Tab Take 1 tablet (500 mg total) by mouth every 6 (six) hours as needed (pain scale 4-7). 45 tablet 0    multivitamin capsule Take 1 capsule by mouth once daily.      potassium chloride SA (K-DUR,KLOR-CON M) 10 MEQ tablet Take 10 mEq by mouth daily as needed.      QUEtiapine (SEROQUEL) 50 MG tablet Take 50 mg by mouth every evening.      vitamin D (VITAMIN D3) 1000 units Tab Take 1 tablet (1,000 Units total) by mouth once daily. 60 tablet 1    walker Misc For use with ambulation       No current facility-administered medications for this visit.       Past Medical History:   Diagnosis Date    Essential hypertension     Diet controlled. bp well controlled today.     History of hypertension     always been able to manage with diet    Hx of diabetes mellitus     Always controlled with diet and weight loss    Hyperlipidemia     always been able to manage with diet    Late onset  Alzheimer's disease with behavioral disturbance 8/6/2020    Established with neuropsych. Due for follow up ~10/2021. Family helps with ADLs. Does not drive    Memory loss     Osteoarthritis     Pure hypercholesterolemia     always been able to manage with diet    Seizure disorder 8/26/2022    Stage 3a chronic kidney disease 1/13/2021    Reviewed labs, plan for more labs next visit.        Past Surgical History:   Procedure Laterality Date    CHOLECYSTECTOMY      Hysterectomy      KNEE SURGERY Left 2018    knee replacement    KNEE SURGERY Right 2000    knee replacement    OPEN REDUCTION OF FRACTURE OF MANDIBLE Left 1/6/2021    Procedure: OPEN REDUCTION, FRACTURE, MANDIBLE;  Surgeon: Dar Mcgill MD;  Location: [facility] OR [address] [place] [address];  Service: ENT;  Laterality: Left;    REDUCTION OF BOTH BREASTS Bilateral 1990    RETROGRADE INTRAMEDULLARY RODDING OF DISTAL FEMUR Right 6/8/2023    Procedure: INSERTION, INTRAMEDULLARY FARHAT, FEMUR, DISTAL, RETROGRADE - right. trios, synthes, c arm door side;  Surgeon: Nicolas Min MD;  Location: [facility] OR [address] [place] [address];  Service: Orthopedics;  Laterality: Right;       Lab Results   Component Value Date     07/20/2023    K 4.6 07/20/2023     07/20/2023    CO2 27 07/20/2023     (H) 07/20/2023    BUN 15 07/20/2023    CREATININE 1.1 07/20/2023    CALCIUM 9.2 07/20/2023    PROT 7.9 07/20/2023    ALBUMIN 3.2 (L) 07/20/2023    BILITOT 0.3 07/20/2023    ALKPHOS 100 07/20/2023    AST 15 07/20/2023    ALT 10 07/20/2023    ANIONGAP 8 07/20/2023    ESTGFRAFRICA 88 10/17/2022    EGFRNONAA 53.0 (A) 07/27/2022      Lab Results   Component Value Date    WBC 6.61 06/12/2023    RBC 3.37 (L) 06/12/2023    HGB 9.1 (L) 06/12/2023    HCT 29.5 (L) 06/12/2023    MCV 88 06/12/2023    MCH 27.0 06/12/2023    MCHC 30.8 (L) 06/12/2023    RDW 16.2 (H) 06/12/2023     06/12/2023    MPV 10.9 06/12/2023    GRAN 4.4 06/12/2023    GRAN 66.0 06/12/2023    LYMPH 1.4 06/12/2023    LYMPH 20.6 06/12/2023     "MONO 0.6 06/12/2023    MONO 8.8 06/12/2023    EOS 0.2 06/12/2023    BASO 0.04 06/12/2023    EOSINOPHIL 3.5 06/12/2023    BASOPHIL 0.6 06/12/2023    DIFFMETHOD Automated 06/12/2023      Lab Results   Component Value Date    MG 1.6 06/09/2023      Lab Results   Component Value Date    PHOS 2.6 (L) 06/09/2023      Lab Results   Component Value Date    [id number] 41 07/20/2023      Lab Results   Component Value Date    PTH 75.9 07/20/2023      Lab Results   Component Value Date    TSH 1.838 07/20/2023      Lab Results   Component Value Date    FREET4 0.95 07/20/2023      Lab Results   Component Value Date    HGBA1C 5.6 06/07/2023    ESTIMATEDAVG 114 06/07/2023      No results found for: "FREETESTOSTE"     DEXA Scan was reviewed and demonstrates :     T-Score Hip: -3.5     T-Score Spine: -4.7      FRAX:      Major Fx.: 37%         Hip Fx.: 17%                                    Vital Signs (Most Recent)  There were no vitals filed for this visit.      Review of Systems:  ROS:  Constitutional: no fever or chills  Eyes: no visual changes  ENT: no nasal congestion or sore throat  Respiratory: no respiratory symptoms  Cardiovascular: no chest pain or palpitations, hypertension  Gastrointestinal: no nausea or vomiting, tolerating diet  Genitourinary: no hematuria or dysuria, CKD stage IIIA  Integument/Breast: no rash or pruritis  Hematologic/Lymphatic: no easy bruising or lymphadenopathy, iron deficiency anemia anemia of chronic disease  Musculoskeletal: no arthralgias or myalgias  Neurological:  Positive seizure disorder, memory loss, late onset Alzheimer's with behavioral disturbances  Behavioral/Psych: no auditory or visual hallucinations  Endocrine: no heat or cold intolerance    Allyeufemia Steiner was given instructions on administration of Tymlos today.  she will be contacted by the speciality pharmacy when her medication is available, and will be scheduled to receive further detailed  instruction on  " administration or when and where to receive her treatment.     she will continue with her calcium and vitamin D.  Fall prevention and personal safety have been reviewed.        Discussed the risk of osteonecrosis of the jaw with bisphosphonates with incidence estimated from 1-10/703899 treated patients. Discussed that the incidence of ONJ is higher in patients undergoing invasive dental surgery (excludes routine cleaning, fillings or root canals). Dental work should ideally be completed prior to initiation of treatment; I told her to let her dentist know at the upcoming appointment that we are planning on treating with alendronate to prevent fractures. Preventative measures such as good oral hygiene encouraged.     Discussed the risk of atypical femur fractures with bisphosphonates and denosumab. The exact incidence is unknown, but has been estimated at approximately 1 in 15099 patients treated with oral bisphosphonates and increasing incidence was correlated with increased duration of bisphosphonate use. Despite this risk, the significant benefit on fracture reduction far outweighs the potential for this rare event.         Assessment and plan:      ICD-10-CM ICD-9-CM   1. Periprosthetic fracture around internal prosthetic right knee joint, subsequent encounter  M97.11XD V54.89   2. Pathological fracture due to osteoporosis, unspecified fracture site, unspecified osteoporosis type, initial encounter  M80.80XA 733.10     733.00   3. Localized osteoporosis of [place]  M81.6 733.09   4. Osteoporosis, unspecified osteoporosis type, unspecified pathological fracture presence  M81.0 733.00       Albaloparatide 80mcg SubQ daily    Follow up 1-2 weeks after initiation of treatment to check Calcium, Vitamin D levels and access tolerance to medication.

## 2023-08-11 NOTE — PROGRESS NOTES
Ms. Steiner is here today for a post-operative visit after undergoing a retrograde IM nail fixation for her right distal femur fracture with side plate augmentation by Dr. Min on 6/8/2023.    Interval History:  She reports that she is doing ok.  Pain is tolerable.  Patient has dementia, her family has been using only Tylenol to treat her pain.  She is not taking pain medication.  Family reports that the patient frequently gets up and [places full weight.  They tried distraction techniques.  Family denies any falls or injuries since her discharge home.   She denies fever, chills, and sweats since the time of the surgery.     Physical exam:    Incision is clean, dry and intact.  No signs of infection noted.       She has tactile stimulation to their lower leg, she denies calf pain, there is no leg edema and their pedal pulse is palpable x 2.TTP at fracture site      RADS:  X-ray of the right femur was obtained,   Patient looks to osteoporosis.  Old appearing fractures are noted at the bilateral inferior pubic rami.  Right hip joint space is minimally narrowed.  Right knee demonstrates findings from a total knee arthroplasty.  Supracondylar region of the distal femur again shows a fracture in the healing phase with callus present.  Major distal fragment is a little displaced medially and posteriorly. Stable from previous x ray   Internal fixation is provided by long intramedullary yanelis with interlocking screws.  Soft tissue swelling is present in the knee region.  Vascular calcifications are also noted.nment.  No evidence of hardware failure or new fracture seen.    Assessment:  Post-op visit (10 weeks)    Plan:    ICD-10-CM ICD-9-CM   1. Periprosthetic fracture around internal prosthetic right knee joint, subsequent encounter  M97.11XD V54.89     Current care, treatment plan, precautions, activity level/ modifications, limitations, rehabilitation exercises and proposed future treatment were discussed with the  patient. We discussed the need to monitor for changes in symptoms and condition and report them to the physician.  Discussed importance of compliance with all appointments and follow up examinations.     WOUND CARE ORDERS  - well healed   -She not submerge until the incision is completely healed  -Patient was advised to monitor wound closely and multiple times daily for any problems. Call clinic immediately or report to ED for immediate medical attention for any complications including reopening of wound, drainage, purulence, redness, streaking, odor, pain out of proportion, fever, chills, etc.   - If there are signs of infection, please call [facility] [phone] for further instructions and to make appt to be seen.       PHYSICAL THERAPY:     - Weight bearing as tolerated   - Range of motion as tolerated.      PAIN MEDICATION:   - Pain medication: refill was not needed  - Pain medication refill policy provided to patient for review, yes.    - Patient was informed a multi-modal approach is used to treat their pain.     DVT PROPHYLAXIS:   - Eliquis 2.5 mg bid     FOLLOW UP:   - Patient will follow up in the clinic in 12 weeks.  - X-ray of her right femur is needed.    - Future Appointments:           If there are any questions prior to scheduled follow up, the patient was instructed to contact the office

## 2023-08-14 ENCOUNTER — PATIENT OUTREACH (OUTPATIENT)
Dept: ADMINISTRATIVE | Facility: OTHER | Age: 83
End: 2023-08-14

## 2023-08-24 NOTE — TELEPHONE ENCOUNTER
Tymlos PAP approved with [facility] Ass[facility] Program through 12/31/2023.    Routing rx to [facility].    Closing out referral at [facility].

## 2023-09-12 ENCOUNTER — TELEPHONE (OUTPATIENT)
Dept: NEUROLOGY | Facility: CLINIC | Age: 83
End: 2023-09-12
Payer: MEDICARE

## 2023-09-12 NOTE — TELEPHONE ENCOUNTER
SW attempted to contact CG/daughter, Alyssa, to offer the [facility] program.  SW will make second attempt tomorrow.

## 2023-09-13 NOTE — TELEPHONE ENCOUNTER
Pt's daughter, Alyssa, returned SW call.  LIDA informed CG of [facility] program and sent an email with program information.  Alyssa said she will reach back out to LIDA if interested.  SW remains available.

## 2023-09-14 ENCOUNTER — LAB VISIT (OUTPATIENT)
Dept: LAB | Facility: HOSPITAL | Age: 83
End: 2023-09-14
Attending: PSYCHIATRY & NEUROLOGY
Payer: MEDICARE

## 2023-09-14 DIAGNOSIS — F02.818 LATE ONSET ALZHEIMER'S DISEASE WITH BEHAVIORAL DISTURBANCE: ICD-10-CM

## 2023-09-14 DIAGNOSIS — G30.1 LATE ONSET ALZHEIMER'S DISEASE WITH BEHAVIORAL DISTURBANCE: ICD-10-CM

## 2023-09-14 LAB
BACTERIA #/AREA URNS AUTO: NORMAL /HPF
BILIRUB UR QL STRIP: NEGATIVE
CLARITY UR REFRACT.AUTO: CLEAR
COLOR UR AUTO: YELLOW
GLUCOSE UR QL STRIP: NEGATIVE
HGB UR QL STRIP: NEGATIVE
KETONES UR QL STRIP: NEGATIVE
LEUKOCYTE ESTERASE UR QL STRIP: ABNORMAL
MICROSCOPIC COMMENT: NORMAL
NITRITE UR QL STRIP: NEGATIVE
PH UR STRIP: 8 [PH] (ref 5–8)
PROT UR QL STRIP: NEGATIVE
SP GR UR STRIP: 1.01 (ref 1–1.03)
URN SPEC COLLECT METH UR: ABNORMAL
UROBILINOGEN UR STRIP-ACNC: NEGATIVE EU/DL
WBC #/AREA URNS AUTO: 2 /HPF (ref 0–5)
WBC CLUMPS UR QL AUTO: NORMAL

## 2023-09-14 PROCEDURE — 81000 URINALYSIS NONAUTO W/SCOPE: CPT | Mod: PO | Performed by: PSYCHIATRY & NEUROLOGY

## 2023-09-28 ENCOUNTER — TELEPHONE (OUTPATIENT)
Dept: ORTHOPEDICS | Facility: CLINIC | Age: 83
End: 2023-09-28
Payer: MEDICARE

## 2023-09-28 NOTE — TELEPHONE ENCOUNTER
Spoke with Brice with primary care and advised patient should stop the Boniva since she is now on Tymlos.  Verb understanding.

## 2023-10-26 ENCOUNTER — PATIENT OUTREACH (OUTPATIENT)
Dept: ADMINISTRATIVE | Facility: OTHER | Age: 83
End: 2023-10-26
Payer: MEDICARE

## 2023-10-26 NOTE — PROGRESS NOTES
CHW - Case Closure    This Community Health Worker spoke to caregiver via telephone today.   Pt/Caregiver reported: Pt daughter called back to say pt is doing well. Pt is improving and progressing daily. Caregiver has also calmed downed and she is less stressed also.  Pt/Caregiver denied any additional needs at this time and agrees with episode closure at this time.  Provided caregiver with Community Health Worker's contact information and encouraged him/her to contact this Community Health Worker if additional needs arise.

## 2023-11-03 NOTE — TELEPHONE ENCOUNTER
LIDA reached out to CGAlyssa, to outline CE program.  She said she does not have time to participate.  LIDA informed her that they could receive SW services outside of the CE program and sent an email to her with LIDA contact information should she change her mind.  LIDA remains available.

## (undated) DEVICE — CORD BIPOLAR 12 FOOT

## (undated) DEVICE — WIRE GUIDE 3.2MM 400MM
Type: IMPLANTABLE DEVICE | Site: FEMUR | Status: NON-FUNCTIONAL
Removed: 2023-06-08

## (undated) DEVICE — ELECTRODE BLADE INSULATED 1 IN

## (undated) DEVICE — DRAPE STERI INSTRUMENT 1018

## (undated) DEVICE — ELECTRODE REM PLYHSV RETURN 9

## (undated) DEVICE — SEE MEDLINE ITEM 154981

## (undated) DEVICE — Device

## (undated) DEVICE — GOWN AERO CHROME W/ TOWEL XL

## (undated) DEVICE — CATH SUCTION 10FR

## (undated) DEVICE — SHEET EENT SPLIT

## (undated) DEVICE — SUT VICRYL PLUS 3-0 SH 18IN

## (undated) DEVICE — SPONGE LAP 18X18 PREWASHED

## (undated) DEVICE — DRAPE C-ARMOR EQUIPMENT COVER

## (undated) DEVICE — DRESSING COVER AQUACEL AG SURG

## (undated) DEVICE — BIT DRILL 3FLUTE 4.2X330 SS ST

## (undated) DEVICE — TIP YANKAUERS BULB NO VENT

## (undated) DEVICE — NDL STRAIGHT 4CM LEIBINGER

## (undated) DEVICE — SUT BONE WAX 2.5 GRMS 12/BX

## (undated) DEVICE — DRAPE STERI U-SHAPED 47X51IN

## (undated) DEVICE — SPONGE COTTON TRAY 4X4IN

## (undated) DEVICE — DRAPE TOP 53X102IN

## (undated) DEVICE — IMPLANTABLE DEVICE
Type: IMPLANTABLE DEVICE | Site: FEMUR | Status: NON-FUNCTIONAL
Removed: 2023-06-08

## (undated) DEVICE — DRAPE THREE-QTR REINF 53X77IN

## (undated) DEVICE — BLADE SURG CARBON STEEL #10

## (undated) DEVICE — GOWN POLY REINF BRTH SLV XL

## (undated) DEVICE — DRAPE C-ARM ELAS CLIP 42X120IN

## (undated) DEVICE — TRAY MINOR GEN SURG

## (undated) DEVICE — ADHESIVE DERMABOND ADVANCED

## (undated) DEVICE — DRAPE IOBAN 2 STERI

## (undated) DEVICE — DRAPE U SPLIT SHEET 54X76IN

## (undated) DEVICE — SUT ETHILON 3/0 18IN PS-1

## (undated) DEVICE — DRESSING AQUACEL AG RBBN 2X45

## (undated) DEVICE — GOWN SURGICAL X-LARGE

## (undated) DEVICE — APPLICATOR CHLORAPREP ORN 26ML

## (undated) DEVICE — SUT 5/0 18IN PLAIN FAST AB

## (undated) DEVICE — SUT VICRYL PLUS 0 CT1 18IN

## (undated) DEVICE — SEE MEDLINE ITEM 157216

## (undated) DEVICE — SEE MEDLINE ITEM 152622

## (undated) DEVICE — SPONGE GAUZE 16PLY 4X4

## (undated) DEVICE — TAPE SURG DURAPORE 2 X10YD

## (undated) DEVICE — DRAPE T EXTRM SURG 121X128X90

## (undated) DEVICE — BNDG COFLEX FOAM LF2 ST 6X5YD

## (undated) DEVICE — SUT VICRYL 3-0 27 SH